# Patient Record
Sex: FEMALE | Race: BLACK OR AFRICAN AMERICAN | NOT HISPANIC OR LATINO | Employment: FULL TIME | URBAN - METROPOLITAN AREA
[De-identification: names, ages, dates, MRNs, and addresses within clinical notes are randomized per-mention and may not be internally consistent; named-entity substitution may affect disease eponyms.]

---

## 2017-03-28 DIAGNOSIS — Z12.31 ENCOUNTER FOR SCREENING MAMMOGRAM FOR MALIGNANT NEOPLASM OF BREAST: ICD-10-CM

## 2017-03-30 ENCOUNTER — LAB REQUISITION (OUTPATIENT)
Dept: LAB | Facility: HOSPITAL | Age: 52
End: 2017-03-30
Payer: COMMERCIAL

## 2017-03-30 ENCOUNTER — ALLSCRIPTS OFFICE VISIT (OUTPATIENT)
Dept: OTHER | Facility: OTHER | Age: 52
End: 2017-03-30

## 2017-03-30 DIAGNOSIS — Z01.419 ENCOUNTER FOR GYNECOLOGICAL EXAMINATION WITHOUT ABNORMAL FINDING: ICD-10-CM

## 2017-03-30 PROCEDURE — G0145 SCR C/V CYTO,THINLAYER,RESCR: HCPCS | Performed by: OBSTETRICS & GYNECOLOGY

## 2017-03-31 ENCOUNTER — CONVERSION ENCOUNTER (OUTPATIENT)
Dept: MAMMOGRAPHY | Facility: CLINIC | Age: 52
End: 2017-03-31

## 2017-03-31 ENCOUNTER — GENERIC CONVERSION - ENCOUNTER (OUTPATIENT)
Dept: OTHER | Facility: OTHER | Age: 52
End: 2017-03-31

## 2017-04-04 LAB
LAB AP GYN PRIMARY INTERPRETATION: NORMAL
LAB AP LMP: NORMAL
Lab: NORMAL

## 2017-11-09 ENCOUNTER — HOSPITAL ENCOUNTER (OUTPATIENT)
Dept: ULTRASOUND IMAGING | Facility: HOSPITAL | Age: 52
Discharge: HOME | End: 2017-11-09
Payer: COMMERCIAL

## 2017-11-09 ENCOUNTER — TRANSCRIBE ORDERS (OUTPATIENT)
Dept: ADMINISTRATIVE | Facility: HOSPITAL | Age: 52
End: 2017-11-09

## 2017-11-09 DIAGNOSIS — R13.19 CONSTANT LOW-GRADE DYSPHAGIA: Primary | ICD-10-CM

## 2017-11-09 DIAGNOSIS — R13.19 CONSTANT LOW-GRADE DYSPHAGIA: ICD-10-CM

## 2017-11-09 PROCEDURE — 76536 US EXAM OF HEAD AND NECK: CPT

## 2018-01-13 VITALS
HEIGHT: 60 IN | SYSTOLIC BLOOD PRESSURE: 120 MMHG | BODY MASS INDEX: 30.23 KG/M2 | DIASTOLIC BLOOD PRESSURE: 78 MMHG | WEIGHT: 154 LBS

## 2018-04-03 ENCOUNTER — ANNUAL EXAM (OUTPATIENT)
Dept: GYNECOLOGY | Facility: CLINIC | Age: 53
End: 2018-04-03
Payer: COMMERCIAL

## 2018-04-03 VITALS
DIASTOLIC BLOOD PRESSURE: 74 MMHG | WEIGHT: 138 LBS | BODY MASS INDEX: 27.82 KG/M2 | HEIGHT: 59 IN | SYSTOLIC BLOOD PRESSURE: 138 MMHG

## 2018-04-03 DIAGNOSIS — Z13.820 SCREENING FOR OSTEOPOROSIS: ICD-10-CM

## 2018-04-03 DIAGNOSIS — Z12.31 ENCOUNTER FOR SCREENING MAMMOGRAM FOR MALIGNANT NEOPLASM OF BREAST: Primary | ICD-10-CM

## 2018-04-03 DIAGNOSIS — Z01.419 ENCOUNTER FOR GYNECOLOGICAL EXAMINATION WITHOUT ABNORMAL FINDING: ICD-10-CM

## 2018-04-03 DIAGNOSIS — D25.1 FIBROIDS, INTRAMURAL: ICD-10-CM

## 2018-04-03 PROCEDURE — S0612 ANNUAL GYNECOLOGICAL EXAMINA: HCPCS | Performed by: OBSTETRICS & GYNECOLOGY

## 2018-04-03 PROCEDURE — G0145 SCR C/V CYTO,THINLAYER,RESCR: HCPCS | Performed by: OBSTETRICS & GYNECOLOGY

## 2018-04-03 PROCEDURE — 76977 US BONE DENSITY MEASURE: CPT | Performed by: OBSTETRICS & GYNECOLOGY

## 2018-04-03 RX ORDER — LORATADINE AND PSEUDOEPHEDRINE 10; 240 MG/1; MG/1
1 TABLET, EXTENDED RELEASE ORAL DAILY
COMMUNITY
End: 2018-09-27 | Stop reason: SDUPTHER

## 2018-04-03 NOTE — PROGRESS NOTES
Assessment/Plan:         Diagnoses and all orders for this visit:    Encounter for screening mammogram for malignant neoplasm of breast  -     Cancel: Mammo screening bilateral w 3d & cad; Future  -     Mammo screening bilateral w cad; Future    Encounter for gynecological examination without abnormal finding    Fibroids, intramural--stable  Will follow    Screening for osteoporosis  Heel scan today: +1 2  Other orders  -     loratadine-pseudoephedrine (CLARITIN-D 24-HOUR)  mg per 24 hr tablet; Take 1 tablet by mouth daily        Subjective:      Patient ID: Devon Perez is a 48 y o  female  No c/o  Hx fibroids  S/P myomectomy  LMP 7/17  Tolerating vasomotor symptoms    HPI    The following portions of the patient's history were reviewed and updated as appropriate: allergies, current medications, past family history, past medical history, past social history, past surgical history and problem list     Review of Systems   Constitutional: Negative  Gastrointestinal: Negative  Genitourinary: Negative  Objective:      /74 (BP Location: Left arm, Patient Position: Sitting, Cuff Size: Standard)   Ht 4' 11 45" (1 51 m)   Wt 62 6 kg (138 lb)   LMP 07/03/2017   BMI 27 45 kg/m²          Physical Exam   Constitutional: She appears well-developed and well-nourished  Neck: Normal range of motion  Neck supple  No thyromegaly present  Cardiovascular: Normal rate, regular rhythm and normal heart sounds  Pulmonary/Chest: Effort normal and breath sounds normal  Right breast exhibits no inverted nipple, no mass, no nipple discharge, no skin change and no tenderness  Left breast exhibits no inverted nipple, no mass, no nipple discharge, no skin change and no tenderness  Abdominal: Soft  Bowel sounds are normal  She exhibits no mass  There is no tenderness  Hernia confirmed negative in the right inguinal area and confirmed negative in the left inguinal area     Genitourinary: Vagina normal  There is no rash or lesion on the right labia  There is no rash or lesion on the left labia  Uterus is enlarged  Cervix exhibits no motion tenderness, no discharge and no friability  Right adnexum displays no mass, no tenderness and no fullness  Left adnexum displays no mass, no tenderness and no fullness  Genitourinary Comments: 12 week size stable fibroid uterus   Lymphadenopathy:        Right: No inguinal adenopathy present  Left: No inguinal adenopathy present

## 2018-04-04 ENCOUNTER — CONVERSION ENCOUNTER (OUTPATIENT)
Dept: MAMMOGRAPHY | Facility: CLINIC | Age: 53
End: 2018-04-04

## 2018-04-06 LAB
LAB AP GYN PRIMARY INTERPRETATION: NORMAL
Lab: NORMAL

## 2018-08-01 ENCOUNTER — OFFICE VISIT (OUTPATIENT)
Dept: FAMILY MEDICINE CLINIC | Facility: CLINIC | Age: 53
End: 2018-08-01
Payer: COMMERCIAL

## 2018-08-01 VITALS
BODY MASS INDEX: 26.66 KG/M2 | SYSTOLIC BLOOD PRESSURE: 110 MMHG | TEMPERATURE: 97.9 F | DIASTOLIC BLOOD PRESSURE: 70 MMHG | HEART RATE: 88 BPM | WEIGHT: 134 LBS

## 2018-08-01 DIAGNOSIS — M25.511 RIGHT SHOULDER PAIN, UNSPECIFIED CHRONICITY: Primary | ICD-10-CM

## 2018-08-01 PROCEDURE — 99213 OFFICE O/P EST LOW 20 MIN: CPT | Performed by: FAMILY MEDICINE

## 2018-08-01 RX ORDER — MELATONIN
COMMUNITY
Start: 2017-05-29 | End: 2018-09-27 | Stop reason: SDUPTHER

## 2018-08-01 NOTE — PATIENT INSTRUCTIONS
We will secure the report of the CT scan  Call me next Wednesday if you do not hear from me in the meantime  There are no restrictions

## 2018-08-01 NOTE — PROGRESS NOTES
Assessment/Plan:    No problem-specific Assessment & Plan notes found for this encounter  Diagnoses and all orders for this visit:    Right shoulder pain, unspecified chronicity  Comments:  had xray+ ct : pending receipt of report     will secure     likely dislocated th s-c- joint     Other orders  -     cholecalciferol (VITAMIN D3) 1,000 units tablet; use as directed          Subjective:      Patient ID: Wesley Hull is a 48 y o  female  Patient comes to evaluate right shoulder issues  At the end of May a she experienced an injury to the right shoulder while pushing a heavy object  With her shoulder  At that time she was seen and had x-rays done  X-ray was normal but she continued to have pain  It did not respond to 2 courses of anti-inflammatories  She subsequently underwent a CT scan of the area  They told her about some nodule  I do not have the immediate report but I will secure this  At this time she does not have too much pain but she is concerned about the deformity and about the nodularity  The following portions of the patient's history were reviewed and updated as appropriate: allergies, current medications, past family history, past medical history, past social history, past surgical history and problem list     Review of Systems   Musculoskeletal: Positive for arthralgias           Objective:  Vitals:    08/01/18 0954   BP: 110/70   BP Location: Left arm   Patient Position: Sitting   Cuff Size: Adult   Pulse: 88   Temp: 97 9 °F (36 6 °C)   Weight: 60 8 kg (134 lb)      Physical Exam   Musculoskeletal:   Prominence of R sterno clavicular jt : non tender

## 2018-08-07 ENCOUNTER — TELEPHONE (OUTPATIENT)
Dept: FAMILY MEDICINE CLINIC | Facility: CLINIC | Age: 53
End: 2018-08-07

## 2018-08-17 ENCOUNTER — TELEPHONE (OUTPATIENT)
Dept: FAMILY MEDICINE CLINIC | Facility: CLINIC | Age: 53
End: 2018-08-17

## 2018-08-17 NOTE — TELEPHONE ENCOUNTER
I do not see that Truman Garcia was contacted about CT scan report   She had CT scan and showed nodule on lung and was recommended to have repeat in 1 year

## 2018-09-27 ENCOUNTER — OFFICE VISIT (OUTPATIENT)
Dept: FAMILY MEDICINE CLINIC | Facility: CLINIC | Age: 53
End: 2018-09-27
Payer: COMMERCIAL

## 2018-09-27 VITALS
BODY MASS INDEX: 25.24 KG/M2 | RESPIRATION RATE: 16 BRPM | TEMPERATURE: 96.2 F | DIASTOLIC BLOOD PRESSURE: 60 MMHG | WEIGHT: 133.7 LBS | SYSTOLIC BLOOD PRESSURE: 100 MMHG | HEIGHT: 61 IN | HEART RATE: 84 BPM

## 2018-09-27 DIAGNOSIS — Z00.00 HEALTH MAINTENANCE EXAMINATION: Primary | ICD-10-CM

## 2018-09-27 PROBLEM — J30.9 ALLERGIC RHINITIS: Status: ACTIVE | Noted: 2017-10-12

## 2018-09-27 PROBLEM — R13.10 DIFFICULTY SWALLOWING: Status: ACTIVE | Noted: 2017-11-07

## 2018-09-27 PROBLEM — R13.10 DYSPHAGIA: Status: ACTIVE | Noted: 2017-11-07

## 2018-09-27 PROCEDURE — 99396 PREV VISIT EST AGE 40-64: CPT | Performed by: NURSE PRACTITIONER

## 2018-09-27 RX ORDER — LORATADINE 10 MG/1
TABLET ORAL EVERY 24 HOURS
COMMUNITY
Start: 2017-10-01 | End: 2019-07-08 | Stop reason: SDUPTHER

## 2018-09-27 RX ORDER — OMEPRAZOLE 20 MG/1
CAPSULE, DELAYED RELEASE ORAL
COMMUNITY
Start: 2017-12-12 | End: 2019-10-01 | Stop reason: ALTCHOICE

## 2018-09-27 NOTE — PROGRESS NOTES
Grupo Norris is a 48 y o   female and is here for routine health maintenance  The patient reports problems - hot flashes  Also having TMJ specialist , mouth guards, and had PT  Was having problems swallowing   Had a scope  Was ok    History of Present Illness     HPI  Here for wellness visit  Only issues as above  Well Adult Physical   Patient here for a comprehensive physical exam       Diet and Physical Activity  Diet: well balanced diet  Weight concerns: Patient is overweight (BMI 25 0-29  9)  Exercise: intermittently      Depression Screen  PHQ-9 Depression Screening    PHQ-9:    Frequency of the following problems over the past two weeks:       Little interest or pleasure in doing things:  0 - not at all  Feeling down, depressed, or hopeless:  0 - not at all  PHQ-2 Score:  0          General Health  Hearing: Normal:  bilateral  Vision: no vision problems  Dental: regular dental visits     History:  LMP: Patient's last menstrual period was 07/03/2017  Menopause at 46 years         Cancer Screening  Colononoscopy at age 48  Mammogram up to date   Pap up to date  Abnormal pap? no  Smoker no Annual screening with low-dose helical computed tomography (CT) for patients age 54 to 76 years with history of smoking at least 30 pack-years and, if a former smoker, had quit within the previous 15 years      The following portions of the patient's history were reviewed and updated as appropriate: allergies, current medications, past family history, past medical history, past social history, past surgical history and problem list     Review of Systems     Review of Systems   Constitutional: Negative for activity change, appetite change, fatigue, fever and unexpected weight change  HENT: Negative for congestion, dental problem, rhinorrhea, sinus pain and sneezing  Eyes: Negative for discharge and visual disturbance  Respiratory: Negative for cough and wheezing      Cardiovascular: Negative for chest pain and leg swelling  Gastrointestinal: Negative for abdominal pain, constipation, diarrhea, nausea and vomiting  Endocrine: Negative for polydipsia and polyuria  Genitourinary: Negative for dysuria and frequency  Musculoskeletal: Negative for arthralgias and back pain  Skin: Negative for rash  Allergic/Immunologic: Negative for environmental allergies and food allergies  Neurological: Negative for dizziness, light-headedness, numbness and headaches  Hematological: Negative for adenopathy  Psychiatric/Behavioral: Negative for behavioral problems and sleep disturbance  Past Medical History     Past Medical History:   Diagnosis Date    Fibroids 1994    UTERUS       Past Surgical History     History reviewed  No pertinent surgical history      Social History     Social History     Social History    Marital status: Single     Spouse name: N/A    Number of children: N/A    Years of education: N/A     Social History Main Topics    Smoking status: Never Smoker    Smokeless tobacco: Never Used    Alcohol use No    Drug use: No    Sexual activity: Not Asked     Other Topics Concern    None     Social History Narrative    None       Family History     Family History   Problem Relation Age of Onset    Hypertension Mother     Cancer Father     Heart attack Family     Stroke Family        Current Medications       Current Outpatient Prescriptions:     cholecalciferol (VITAMIN D3) 1,000 units tablet, use as directed, Disp: , Rfl:     loratadine (CLARITIN) 10 mg tablet, every 24 hours, Disp: , Rfl:     Multiple Vitamins-Minerals (MULTIVITAMIN ADULT PO), every 24 hours, Disp: , Rfl:     omeprazole (PriLOSEC) 20 mg delayed release capsule, take 1 capsule by oral route 2 times every day before a meal and at bedtime, Disp: , Rfl:      Allergies     Allergies   Allergen Reactions    Cefuroxime      Other reaction(s): Hives    Cetirizine      Other reaction(s): sleepy  Other reaction(s): sleepy    Fexofenadine      Other reaction(s): palpitations  Other reaction(s): palpitations    Penicillins     Sulfa Antibiotics     Sulfanilamide      Other reaction(s): Hives       Objective     /60 (BP Location: Left arm, Patient Position: Sitting, Cuff Size: Adult)   Pulse 84   Temp (!) 96 2 °F (35 7 °C) (Temporal)   Resp 16   Ht 5' 0 63" (1 54 m)   Wt 60 6 kg (133 lb 11 2 oz)   LMP 07/03/2017   BMI 25 57 kg/m²      Physical Exam   Constitutional: She is oriented to person, place, and time  She appears well-developed and well-nourished  HENT:   Head: Normocephalic  Right Ear: External ear normal    Left Ear: External ear normal    Nose: Nose normal    Eyes: Pupils are equal, round, and reactive to light  Conjunctivae are normal  Right eye exhibits no discharge  Left eye exhibits no discharge  Neck: Normal range of motion  Neck supple  No thyromegaly present  Cardiovascular: Normal rate, regular rhythm and normal heart sounds  No murmur heard  Pulmonary/Chest: Effort normal and breath sounds normal    Abdominal: Soft  Bowel sounds are normal  There is no tenderness  Musculoskeletal: Normal range of motion  Lymphadenopathy:     She has no cervical adenopathy  Neurological: She is alert and oriented to person, place, and time  Skin: Skin is warm  No rash noted  Psychiatric: She has a normal mood and affect  Her behavior is normal          No exam data present    Health Maintenance     Health Maintenance   Topic Date Due    Depression Screening PHQ  1965    CRC Screening: Colonoscopy  1965    INFLUENZA VACCINE  09/01/2018    DTaP,Tdap,and Td Vaccines (3 - Td) 02/21/2028     Immunization History   Administered Date(s) Administered    Influenza TIV (IM) 11/06/2013    Td (adult), adsorbed 02/21/2018    Tdap 04/18/2008       Assessment/Plan     1  Health maintenance examination    Issues with insurance covering lab studies     They will cover lipid and glucose  Will check with and auth--she states when we called with an auth they covered more  Will try for lipid, cmp, tsh, cbc, vitamin d    1  Healthy female exam   2  Patient Counseling:   · Exercise: Stressed the importance of regular exercise with a goal of 150 minutes per week  · Dental Health: Discussed daily flossing and brushing and regular dental visits     · Immunizations reviewed  · Discussed benefits of screening   · Discussed the patient's BMI with her  The BMI is in the acceptable range  3  Cancer Screening   4  Labs   5  Follow up in one year      Jayde Jules

## 2018-09-27 NOTE — PATIENT INSTRUCTIONS
Wellness Visit for Adults   WHAT YOU NEED TO KNOW:   What is a wellness visit? A wellness visit is when you see your healthcare provider to get screened for health problems  You can also get advice on how to stay healthy  Write down your questions so you remember to ask them  Ask your healthcare provider how often you should have a wellness visit  What happens at a wellness visit? Your healthcare provider will ask about your health, and your family history of health problems  This includes high blood pressure, heart disease, and cancer  He or she will ask if you have symptoms that concern you, if you smoke, and about your mood  You may also be asked about your intake of medicines, supplements, food, and alcohol  Any of the following may be done:  · Your weight  will be checked  Your height may also be checked so your body mass index (BMI) can be calculated  Your BMI shows if you are at a healthy weight  · Your blood pressure  and heart rate will be checked  Your temperature may also be checked  · Blood and urine tests  may be done  Blood tests may be done to check your cholesterol levels  Abnormal cholesterol levels increase your risk for heart disease and stroke  You may also need a blood or urine test to check for diabetes if you are at increased risk  Urine tests may be done to look for signs of an infection or kidney disease  · A physical exam  includes checking your heartbeat and lungs with a stethoscope  Your healthcare provider may also check your skin to look for sun damage  · Screening tests  may be recommended  A screening test is done to check for diseases that may not cause symptoms  The screening tests you may need depend on your age, gender, family history, and lifestyle habits  For example, colorectal screening may be recommended if you are 48years old or older  What screening tests do I need if I am a woman? · A Pap smear  is used to screen for cervical cancer   Pap smears are usually done every 3 to 5 years depending on your age  You may need them more often if you have had abnormal Pap smear test results in the past  Ask your healthcare provider how often you should have a Pap smear  · A mammogram  is an x-ray of your breasts to screen for breast cancer  Experts recommend mammograms every 2 years starting at age 48 years  You may need a mammogram at age 52 years or younger if you have an increased risk for breast cancer  Talk to your healthcare provider about when you should start having mammograms and how often you need them  What vaccines might I need? · Get an influenza vaccine  every year  The influenza vaccine protects you from the flu  Several types of viruses cause the flu  The viruses change over time, so new vaccines are made each year  · Get a tetanus-diphtheria (Td) booster vaccine  every 10 years  This vaccine protects you against tetanus and diphtheria  Tetanus is a severe infection that may cause painful muscle spasms and lockjaw  Diphtheria is a severe bacterial infection that causes a thick covering in the back of your mouth and throat  · Get a human papillomavirus (HPV) vaccine  if you are female and aged 23 to 32 or male 23 to 24 and never received it  This vaccine protects you from HPV infection  HPV is the most common infection spread by sexual contact  HPV may also cause vaginal, penile, and anal cancers  · Get a pneumococcal vaccine  if you are aged 72 years or older  The pneumococcal vaccine is an injection given to protect you from pneumococcal disease  Pneumococcal disease is an infection caused by pneumococcal bacteria  The infection may cause pneumonia, meningitis, or an ear infection  · Get a shingles vaccine  if you are aged 61 or older, even if you have had shingles before  The shingles vaccine is an injection to protect you from the varicella-zoster virus  This is the same virus that causes chickenpox   Shingles is a painful rash that develops in people who had chickenpox or have been exposed to the virus  How can I eat healthy? My Plate is a model for planning healthy meals  It shows the types and amounts of foods that should go on your plate  Fruits and vegetables make up about half of your plate, and grains and protein make up the other half  A serving of dairy is included on the side of your plate  The amount of calories and serving sizes you need depends on your age, gender, weight, and height  Examples of healthy foods are listed below:  · Eat a variety of vegetables  such as dark green, red, and orange vegetables  You can also include canned vegetables low in sodium (salt) and frozen vegetables without added butter or sauces  · Eat a variety of fresh fruits , canned fruit in 100% juice, frozen fruit, and dried fruit  · Include whole grains  At least half of the grains you eat should be whole grains  Examples include whole-wheat bread, wheat pasta, brown rice, and whole-grain cereals such as oatmeal     · Eat a variety of protein foods such as seafood (fish and shellfish), lean meat, and poultry without skin (turkey and chicken)  Examples of lean meats include pork leg, shoulder, or tenderloin, and beef round, sirloin, tenderloin, and extra lean ground beef  Other protein foods include eggs and egg substitutes, beans, peas, soy products, nuts, and seeds  · Choose low-fat dairy products such as skim or 1% milk or low-fat yogurt, cheese, and cottage cheese  · Limit unhealthy fats  such as butter, hard margarine, and shortening  How much exercise do I need? Exercise at least 30 minutes per day on most days of the week  Some examples of exercise include walking, biking, dancing, and swimming  You can also fit in more physical activity by taking the stairs instead of the elevator or parking farther away from stores  Include muscle strengthening activities 2 days each week  Regular exercise provides many health benefits  It helps you manage your weight, and decreases your risk for type 2 diabetes, heart disease, stroke, and high blood pressure  Exercise can also help improve your mood  Ask your healthcare provider about the best exercise plan for you  What are some general health and safety guidelines I should follow? · Do not smoke  Nicotine and other chemicals in cigarettes and cigars can cause lung damage  Ask your healthcare provider for information if you currently smoke and need help to quit  E-cigarettes or smokeless tobacco still contain nicotine  Talk to your healthcare provider before you use these products  · Limit alcohol  A drink of alcohol is 12 ounces of beer, 5 ounces of wine, or 1½ ounces of liquor  · Lose weight, if needed  Being overweight increases your risk of certain health conditions  These include heart disease, high blood pressure, type 2 diabetes, and certain types of cancer  · Protect your skin  Do not sunbathe or use tanning beds  Use sunscreen with a SPF 15 or higher  Apply sunscreen at least 15 minutes before you go outside  Reapply sunscreen every 2 hours  Wear protective clothing, hats, and sunglasses when you are outside  · Drive safely  Always wear your seatbelt  Make sure everyone in your car wears a seatbelt  A seatbelt can save your life if you are in an accident  Do not use your cell phone when you are driving  This could distract you and cause an accident  Pull over if you need to make a call or send a text message  · Practice safe sex  Use latex condoms if are sexually active and have more than one partner  Your healthcare provider may recommend screening tests for sexually transmitted infections (STIs)  · Wear helmets, lifejackets, and protective gear  Always wear a helmet when you ride a bike or motorcycle, go skiing, or play sports that could cause a head injury  Wear protective equipment when you play sports   Wear a lifejacket when you are on a boat or doing water sports  CARE AGREEMENT:   You have the right to help plan your care  Learn about your health condition and how it may be treated  Discuss treatment options with your caregivers to decide what care you want to receive  You always have the right to refuse treatment  The above information is an  only  It is not intended as medical advice for individual conditions or treatments  Talk to your doctor, nurse or pharmacist before following any medical regimen to see if it is safe and effective for you  © 2017 [employer] Information is for End User's use only and may not be sold, redistributed or otherwise used for commercial purposes  All illustrations and images included in CareNotes® are the copyrighted property of A [employer]  or Amanuel Calderon

## 2018-10-01 ENCOUNTER — TELEPHONE (OUTPATIENT)
Dept: FAMILY MEDICINE CLINIC | Facility: CLINIC | Age: 53
End: 2018-10-01

## 2018-10-01 NOTE — TELEPHONE ENCOUNTER
Called and spoke to patient to see where she got bloodwork done  She states she didn't get it done yet due to the previous message about a prior auth for her insurance to cover what Paula is ordering  Informed pt will check with Paula and [facility] on Tuesday regarding this

## 2018-10-01 NOTE — TELEPHONE ENCOUNTER
Patient called she said that she was seen by rajeev and that she is to have additional labs done and that with her insurance can get a prior auth done for it to be covered and that rajeev was going to let you know about this and patient also she can be reached at [phone]   TY

## 2018-10-02 DIAGNOSIS — E55.9 VITAMIN D DEFICIENCY: Primary | ICD-10-CM

## 2018-10-02 DIAGNOSIS — R53.83 FATIGUE, UNSPECIFIED TYPE: ICD-10-CM

## 2018-10-02 NOTE — TELEPHONE ENCOUNTER
Pt called and was given the codes for the bloodwork to contact her insurance to see about them approving it for her

## 2018-10-02 NOTE — TELEPHONE ENCOUNTER
Called and left pt a message to call back regarding her labs  Spoke to Nicci Valdovinos regarding previous message  She stated to inform patient to call her insurance and give them the codes to see if they will cover the labs being ordered

## 2018-10-02 NOTE — TELEPHONE ENCOUNTER
I spoke to pt and gave her more CPT codes she called back and was not happy because the insurance would not tell her if the test are covered or not, she said that she wanted to know how much it would be for her to get the bw done and I advised her to call the lab and ask we do not bill for quest  She will call back if she has any other questions

## 2018-10-05 LAB
25(OH)D3 SERPL-MCNC: 47 NG/ML (ref 30–100)
BASOPHILS # BLD AUTO: 30 CELLS/UL (ref 0–200)
BASOPHILS NFR BLD AUTO: 0.4 %
CHOLEST SERPL-MCNC: 198 MG/DL
CHOLEST/HDLC SERPL: 2.5 (CALC)
EOSINOPHIL # BLD AUTO: 113 CELLS/UL (ref 15–500)
EOSINOPHIL NFR BLD AUTO: 1.5 %
ERYTHROCYTE [DISTWIDTH] IN BLOOD BY AUTOMATED COUNT: 12.9 % (ref 11–15)
GLUCOSE P FAST SERPL-MCNC: 74 MG/DL (ref 65–99)
HCT VFR BLD AUTO: 39.2 % (ref 35–45)
HDLC SERPL-MCNC: 79 MG/DL
HGB BLD-MCNC: 13.5 G/DL (ref 11.7–15.5)
LDLC SERPL CALC-MCNC: 103 MG/DL (CALC)
LYMPHOCYTES # BLD AUTO: 2595 CELLS/UL (ref 850–3900)
LYMPHOCYTES NFR BLD AUTO: 34.6 %
MCH RBC QN AUTO: 31.3 PG (ref 27–33)
MCHC RBC AUTO-ENTMCNC: 34.4 G/DL (ref 32–36)
MCV RBC AUTO: 91 FL (ref 80–100)
MONOCYTES # BLD AUTO: 375 CELLS/UL (ref 200–950)
MONOCYTES NFR BLD AUTO: 5 %
NEUTROPHILS # BLD AUTO: 4388 CELLS/UL (ref 1500–7800)
NEUTROPHILS NFR BLD AUTO: 58.5 %
NONHDLC SERPL-MCNC: 119 MG/DL (CALC)
PLATELET # BLD AUTO: 332 THOUSAND/UL (ref 140–400)
PMV BLD REES-ECKER: 8.6 FL (ref 7.5–12.5)
RBC # BLD AUTO: 4.31 MILLION/UL (ref 3.8–5.1)
TRIGL SERPL-MCNC: 71 MG/DL
WBC # BLD AUTO: 7.5 THOUSAND/UL (ref 3.8–10.8)

## 2018-10-08 ENCOUNTER — TELEPHONE (OUTPATIENT)
Dept: FAMILY MEDICINE CLINIC | Facility: CLINIC | Age: 53
End: 2018-10-08

## 2018-10-08 NOTE — TELEPHONE ENCOUNTER
Patient called she would like to know if her lab results came in she had it done at "[facility] she would like the results she can be reached at [phone] ty

## 2018-10-10 ENCOUNTER — TELEPHONE (OUTPATIENT)
Dept: FAMILY MEDICINE CLINIC | Facility: CLINIC | Age: 53
End: 2018-10-10

## 2018-10-10 NOTE — TELEPHONE ENCOUNTER
Patient called she would like her lab results was done at [facility] she called on Monday also she can be reached at [phone] ty

## 2018-10-11 ENCOUNTER — TELEPHONE (OUTPATIENT)
Dept: FAMILY MEDICINE CLINIC | Facility: CLINIC | Age: 53
End: 2018-10-11

## 2018-10-11 NOTE — TELEPHONE ENCOUNTER
Patient called she said that she was given her lab results she would like to know the numbers she can be reached at [phone]   TY

## 2018-11-19 ENCOUNTER — OFFICE VISIT (OUTPATIENT)
Dept: FAMILY MEDICINE CLINIC | Facility: CLINIC | Age: 53
End: 2018-11-19
Payer: COMMERCIAL

## 2018-11-19 VITALS
HEIGHT: 60 IN | RESPIRATION RATE: 18 BRPM | WEIGHT: 133 LBS | SYSTOLIC BLOOD PRESSURE: 120 MMHG | TEMPERATURE: 97.9 F | HEART RATE: 100 BPM | BODY MASS INDEX: 26.11 KG/M2 | DIASTOLIC BLOOD PRESSURE: 80 MMHG

## 2018-11-19 DIAGNOSIS — B35.1 ONYCHOMYCOSIS: Primary | ICD-10-CM

## 2018-11-19 PROCEDURE — 1036F TOBACCO NON-USER: CPT | Performed by: NURSE PRACTITIONER

## 2018-11-19 PROCEDURE — 3008F BODY MASS INDEX DOCD: CPT | Performed by: NURSE PRACTITIONER

## 2018-11-19 PROCEDURE — 99212 OFFICE O/P EST SF 10 MIN: CPT | Performed by: NURSE PRACTITIONER

## 2018-11-19 NOTE — PROGRESS NOTES
Subjective:   Chief Complaint   Patient presents with    Nail Problem     Discoloration        Patient ID: Parag Trejo is a 48 y o  female  Presents today over concern of nail discoloration on bilateral feet and hands  Primarily little toe on both feet with fungal discoloration  Patient has tried an over-the-counter any fungal with some relief  However she discontinues once the appearance starts to look normal   Discussed applying for 2-3 weeks after appearance has returned to normal        The following portions of the patient's history were reviewed and updated as appropriate: allergies, current medications, past family history, past medical history, past social history, past surgical history and problem list     Review of Systems   Constitutional: Negative for chills and fever  Respiratory: Negative for cough  Cardiovascular: Negative for chest pain  Skin: Positive for color change (little toes bilateral feet)  Psychiatric/Behavioral: Negative for dysphoric mood  The patient is not nervous/anxious  Objective:  Vitals:    11/19/18 0956   BP: 120/80   BP Location: Left arm   Patient Position: Sitting   Cuff Size: Standard   Pulse: 100   Resp: 18   Temp: 97 9 °F (36 6 °C)   TempSrc: Oral   Weight: 60 3 kg (133 lb)   Height: 5' (1 524 m)      Physical Exam   Constitutional: She is oriented to person, place, and time  She appears well-developed and well-nourished  Neck: Normal range of motion  Neck supple  Cardiovascular: Normal rate and regular rhythm  Pulmonary/Chest: Effort normal and breath sounds normal    Lymphadenopathy:     She has no cervical adenopathy  Neurological: She is alert and oriented to person, place, and time  Skin: Skin is warm and dry  Bilateral 5th toes with fungal appearance to nail  Psychiatric: She has a normal mood and affect   Her behavior is normal          Assessment/Plan:    No problem-specific Assessment & Plan notes found for this encounter  Diagnoses and all orders for this visit:    Onychomycosis  Comments:   Will try over the counter nail fugus treatment for several weeks

## 2018-11-19 NOTE — PATIENT INSTRUCTIONS
Paronychia   WHAT YOU NEED TO KNOW:   Paronychia is an infection of your nail fold caused by bacteria or a fungus  The nail fold is the skin around your nail  Paronychia may happen suddenly and last for 6 weeks or longer  You may have paronychia on more than 1 finger or toe  DISCHARGE INSTRUCTIONS:   Medicines:   · Td vaccine  is a booster shot used to help prevent tetanus and diphtheria  The Td booster may be given to adolescents and adults every 10 years or for certain wounds and injuries  · Antibiotics: This medicine will help fight or prevent an infection  It may be given as a pill, cream, or ointment  · Steroids: This medicine will help decrease inflammation  It may be given as a pill, cream, or ointment  · Antifungal medicine: This medicine helps kill fungus that may be causing your infection  It may be given as a cream or ointment  · NSAIDs:  These medicines decrease pain and swelling  NSAIDs are available without a doctor's order  Ask your healthcare provider which medicine is right for you  Ask how much to take and when to take it  Take as directed  NSAIDs can cause stomach bleeding and kidney problems if not taken correctly  · Take your medicine as directed  Contact your healthcare provider if you think your medicine is not helping or if you have side effects  Tell him of her if you are allergic to any medicine  Keep a list of the medicines, vitamins, and herbs you take  Include the amounts, and when and why you take them  Bring the list or the pill bottles to follow-up visits  Carry your medicine list with you in case of an emergency  Follow up with your healthcare provider as directed:  Write down your questions so you remember to ask them during your visits  Self-care:   · Soak your nail:  Soak your nail in a mixture of equal parts vinegar and water 3 or 4 times each day  This will help decrease inflammation      · Apply a warm compress:  Soak a washcloth in warm water and place it on your nail  This will help decrease inflammation  · Elevate:  Raise your nail above the level of your heart as often as you can  This will help decrease swelling and pain  Prop your nail on pillows or blankets to keep it elevated comfortably  · Use lotion:  Apply lotion after you wash your hands  This will prevent your skin from becoming too dry  Prevent paronychia:   · Avoid chemicals and allergens that may harm your skin and nails  This includes soaps, laundry detergents, and nail products  · Keep your nails clean and dry  Avoid soaking your nails in water  Use cotton-lined rubber gloves or wear 2 rubber gloves if you work with food or water  The gloves will help protect your nail folds  · Keep your nails short  Do not bite your nails, pick at your hangnails, suck your fingers, or wear fake nails  Bring your own nail tools when you go to the nail salon  Contact your healthcare provider if:   · Your nail becomes loose, deformed, or falls off  · You have a large abscess on your nail  · You have questions or concerns about your condition or care  Return to the emergency department if:   · You have severe nail pain  · The inflammation spreads to your hand or arm  © 2017 [facility] Information is for End User's use only and may not be sold, redistributed or otherwise used for commercial purposes  All illustrations and images included in CareNotes® are the copyrighted property of [employer]  or Amanuel Calderon  The above information is an  only  It is not intended as medical advice for individual conditions or treatments  Talk to your doctor, nurse or pharmacist before following any medical regimen to see if it is safe and effective for you

## 2019-01-11 ENCOUNTER — TELEPHONE (OUTPATIENT)
Dept: GYNECOLOGY | Facility: CLINIC | Age: 54
End: 2019-01-11

## 2019-01-11 NOTE — TELEPHONE ENCOUNTER
An e-mail was sent to the billing office for the claim to be resubmitted with the correct dx code [id number]  Please inform her

## 2019-01-11 NOTE — TELEPHONE ENCOUNTER
Hank Pan states she got a bill from 4/3/2018 for the heel scan  She said she called her insurance and the wrong code was dropped

## 2019-01-21 ENCOUNTER — OFFICE VISIT (OUTPATIENT)
Dept: FAMILY MEDICINE CLINIC | Facility: CLINIC | Age: 54
End: 2019-01-21
Payer: COMMERCIAL

## 2019-01-21 VITALS
DIASTOLIC BLOOD PRESSURE: 76 MMHG | WEIGHT: 138.6 LBS | BODY MASS INDEX: 26.17 KG/M2 | SYSTOLIC BLOOD PRESSURE: 120 MMHG | HEART RATE: 90 BPM | HEIGHT: 61 IN | RESPIRATION RATE: 18 BRPM

## 2019-01-21 DIAGNOSIS — B35.1 ONYCHOMYCOSIS: Primary | ICD-10-CM

## 2019-01-21 PROCEDURE — 99212 OFFICE O/P EST SF 10 MIN: CPT | Performed by: NURSE PRACTITIONER

## 2019-01-21 PROCEDURE — 1036F TOBACCO NON-USER: CPT | Performed by: NURSE PRACTITIONER

## 2019-01-21 PROCEDURE — 3008F BODY MASS INDEX DOCD: CPT | Performed by: NURSE PRACTITIONER

## 2019-01-21 NOTE — PROGRESS NOTES
Subjective:   Chief Complaint   Patient presents with    Follow-up        Patient ID: Jailyn De is a 47 y o  female  Presents today for follow-up on Onychomycosis of multiple toes bilaterally  Patient has been using a different nail application with good success  The following portions of the patient's history were reviewed and updated as appropriate: allergies, current medications, past family history, past medical history, past social history, past surgical history and problem list     Review of Systems   Constitutional: Negative for chills and fever  Respiratory: Negative for cough and shortness of breath  Cardiovascular: Negative for chest pain, palpitations and leg swelling  Skin: Negative for color change (toe nails)  Psychiatric/Behavioral: Negative for dysphoric mood  The patient is not nervous/anxious  Objective:  Vitals:    01/21/19 1342   BP: 120/76   BP Location: Left arm   Patient Position: Sitting   Cuff Size: Standard   Pulse: 90   Resp: 18   Weight: 62 9 kg (138 lb 9 6 oz)   Height: 5' 0 63" (1 54 m)      Physical Exam   Constitutional: She is oriented to person, place, and time  She appears well-developed and well-nourished  Cardiovascular: Normal rate and normal heart sounds  Pulmonary/Chest: Effort normal and breath sounds normal    Neurological: She is alert and oriented to person, place, and time  Skin: Skin is warm and dry  Normal nails on bilateral feet   Psychiatric: She has a normal mood and affect  Her behavior is normal          Assessment/Plan:    No problem-specific Assessment & Plan notes found for this encounter         Diagnoses and all orders for this visit:    Onychomycosis  Comments:  Resolved

## 2019-02-14 ENCOUNTER — TRANSCRIBE ORDERS (OUTPATIENT)
Dept: ADMINISTRATIVE | Facility: HOSPITAL | Age: 54
End: 2019-02-14

## 2019-02-14 DIAGNOSIS — Z12.39 SCREENING BREAST EXAMINATION: Primary | ICD-10-CM

## 2019-04-04 ENCOUNTER — ANNUAL EXAM (OUTPATIENT)
Dept: GYNECOLOGY | Facility: CLINIC | Age: 54
End: 2019-04-04
Payer: COMMERCIAL

## 2019-04-04 ENCOUNTER — HOSPITAL ENCOUNTER (OUTPATIENT)
Dept: MAMMOGRAPHY | Facility: MEDICAL CENTER | Age: 54
Discharge: HOME | End: 2019-04-04
Payer: COMMERCIAL

## 2019-04-04 VITALS
HEIGHT: 59 IN | DIASTOLIC BLOOD PRESSURE: 70 MMHG | BODY MASS INDEX: 28.55 KG/M2 | HEART RATE: 102 BPM | SYSTOLIC BLOOD PRESSURE: 130 MMHG | WEIGHT: 141.6 LBS

## 2019-04-04 DIAGNOSIS — D21.9 FIBROIDS: ICD-10-CM

## 2019-04-04 DIAGNOSIS — N95.1 HOT FLASHES DUE TO MENOPAUSE: ICD-10-CM

## 2019-04-04 DIAGNOSIS — Z12.39 SCREENING BREAST EXAMINATION: ICD-10-CM

## 2019-04-04 DIAGNOSIS — Z12.4 ENCOUNTER FOR PAPANICOLAOU SMEAR FOR CERVICAL CANCER SCREENING: Primary | ICD-10-CM

## 2019-04-04 PROCEDURE — S0612 ANNUAL GYNECOLOGICAL EXAMINA: HCPCS | Performed by: OBSTETRICS & GYNECOLOGY

## 2019-04-04 PROCEDURE — G0145 SCR C/V CYTO,THINLAYER,RESCR: HCPCS | Performed by: OBSTETRICS & GYNECOLOGY

## 2019-04-04 PROCEDURE — 77067 SCR MAMMO BI INCL CAD: CPT

## 2019-04-04 RX ORDER — LORATADINE AND PSEUDOEPHEDRINE 10; 240 MG/1; MG/1
1 TABLET, EXTENDED RELEASE ORAL DAILY
COMMUNITY
End: 2022-04-21 | Stop reason: ALTCHOICE

## 2019-04-08 LAB
LAB AP GYN PRIMARY INTERPRETATION: NORMAL
Lab: NORMAL

## 2019-05-28 ENCOUNTER — TELEPHONE (OUTPATIENT)
Dept: FAMILY MEDICINE CLINIC | Facility: CLINIC | Age: 54
End: 2019-05-28

## 2019-07-08 ENCOUNTER — OFFICE VISIT (OUTPATIENT)
Dept: FAMILY MEDICINE CLINIC | Facility: CLINIC | Age: 54
End: 2019-07-08
Payer: COMMERCIAL

## 2019-07-08 VITALS
SYSTOLIC BLOOD PRESSURE: 132 MMHG | TEMPERATURE: 98.3 F | WEIGHT: 142.5 LBS | HEART RATE: 64 BPM | OXYGEN SATURATION: 100 % | HEIGHT: 59 IN | DIASTOLIC BLOOD PRESSURE: 68 MMHG | RESPIRATION RATE: 18 BRPM | BODY MASS INDEX: 28.73 KG/M2

## 2019-07-08 DIAGNOSIS — M25.532 LEFT WRIST PAIN: Primary | ICD-10-CM

## 2019-07-08 PROCEDURE — 1036F TOBACCO NON-USER: CPT | Performed by: NURSE PRACTITIONER

## 2019-07-08 PROCEDURE — 3008F BODY MASS INDEX DOCD: CPT | Performed by: NURSE PRACTITIONER

## 2019-07-08 PROCEDURE — 99213 OFFICE O/P EST LOW 20 MIN: CPT | Performed by: NURSE PRACTITIONER

## 2019-07-08 RX ORDER — NAPROXEN 250 MG/1
250 TABLET ORAL 2 TIMES DAILY
Qty: 10 TABLET | Refills: 0 | Status: SHIPPED | OUTPATIENT
Start: 2019-07-08 | End: 2019-07-25 | Stop reason: SDUPTHER

## 2019-07-08 RX ORDER — NAPROXEN 250 MG/1
250 TABLET ORAL 2 TIMES DAILY
Refills: 0 | COMMUNITY
Start: 2019-06-27 | End: 2019-07-08 | Stop reason: SDUPTHER

## 2019-07-08 NOTE — PROGRESS NOTES
Subjective:   Chief Complaint   Patient presents with    Wrist Pain     left hand        Patient ID: Delvin Nixon is a 47 y o  female  Presents today with complaints of left thumb into wrist pain since May  She was lifting stones when she 1st began noticing the discomfort  She did reach out to the tele talk to then put her on naproxen twice a day  She has been doing the naproxen twice a day for 10 days with some relief  She has been doing heat as well as ice  Pain is primarily at night after she has been using her hands all day even though she is right-handed and in the a m  The following portions of the patient's history were reviewed and updated as appropriate: allergies, current medications, past family history, past medical history, past social history, past surgical history and problem list     Review of Systems   Constitutional: Negative for chills and fever  Respiratory: Negative for cough  Cardiovascular: Negative for chest pain and palpitations  Musculoskeletal: Positive for arthralgias (left thumb and wrist)  Negative for joint swelling  Psychiatric/Behavioral: Negative for dysphoric mood  The patient is not nervous/anxious  Objective:  Vitals:    07/08/19 1057   BP: 132/68   BP Location: Left arm   Patient Position: Sitting   Cuff Size: Adult   Pulse: 64   Resp: 18   Temp: 98 3 °F (36 8 °C)   TempSrc: Temporal   SpO2: 100%   Weight: 64 6 kg (142 lb 8 oz)   Height: 4' 11" (1 499 m)      Physical Exam   Constitutional: She is oriented to person, place, and time  She appears well-developed and well-nourished  Cardiovascular: Normal rate, regular rhythm, normal heart sounds and intact distal pulses  Pulmonary/Chest: Effort normal and breath sounds normal    Musculoskeletal:        Left wrist: She exhibits tenderness and bony tenderness  She exhibits normal range of motion, no swelling and no deformity     Neurological: She is alert and oriented to person, place, and time    Skin: Skin is warm and dry  Psychiatric: She has a normal mood and affect  Her behavior is normal          Assessment/Plan:    No problem-specific Assessment & Plan notes found for this encounter  Diagnoses and all orders for this visit:    Left wrist pain  Comments:  Heat or Ice to area as needed  Cock up to be worn during the day  Orders:  -     XR wrist 3+ vw left; Future  -     Cock Up Wrist Splint  -     naproxen (NAPROSYN) 250 mg tablet; Take 1 tablet (250 mg total) by mouth 2 (two) times a day    Other orders  -     Discontinue: naproxen (NAPROSYN) 250 mg tablet;  Take 250 mg by mouth 2 (two) times a day

## 2019-07-08 NOTE — PATIENT INSTRUCTIONS
Wrist Injury   WHAT YOU NEED TO KNOW:   A wrist injury happens when the tissues of your wrist joint are damaged  Your wrist joint is made up of tendons, ligaments, nerves, and bones  Two common types of injuries that can happen to your wrist are sprains and strains  A sprain can happen when the ligaments are stretched or torn  Ligaments are bands of elastic tissue that connect and hold the bones together  A strain happens when a tendon or muscle is overused, stretched, or torn  Tendons attach your hand and arm muscles to the bones of the wrist    DISCHARGE INSTRUCTIONS:   Medicines:   · NSAIDs:  These medicines decrease swelling, pain, and fever  NSAIDs are available without a doctor's order  Ask which medicine is right for you  Ask how much to take and when to take it  Take as directed  NSAIDs can cause stomach bleeding and kidney problems if not taken correctly  · Pain medicine: You may be given a prescription medicine to decrease pain  Do not wait until the pain is severe before you take this medicine  · Take your medicine as directed  Contact your healthcare provider if you think your medicine is not helping or if you have side effects  Tell him of her if you are allergic to any medicine  Keep a list of the medicines, vitamins, and herbs you take  Include the amounts, and when and why you take them  Bring the list or the pill bottles to follow-up visits  Carry your medicine list with you in case of an emergency  Follow up with your healthcare provider as directed:  Write down your questions so you remember to ask them during your visits  Manage your symptoms:   · Wrist supports:  A cast or splint may be put on your fingers, hand, and wrist to support your wrist and prevent further damage  Wear these as directed  Ask for instructions on how to bathe while you are wearing a splint or case  · Rest:  You may need to rest your wrist for at least 48 hours and avoid activities that cause pain   Ask what activities you should avoid and for how long  · Ice:  Ice helps decrease swelling and pain  Ice may also help prevent tissue damage  Use an ice pack or put crushed ice in a plastic bag  Cover it with a towel and place it on your injured wrist for 15 to 20 minutes every hour as directed  · Compression:  Your healthcare provider may suggest you wrap your wrist with an elastic bandage  This will help decrease swelling, support your wrist, and help it heal  Wear your wrist wrap as directed  Ask for instructions on how to wrap your wrist     · Elevation:  When you sit or lie down, keep your wrist at or above the level of your heart  This may help decrease pain and swelling  Physical therapy:  Your healthcare provider may recommend that you go to physical therapy  A physical therapist shows you how to do exercises that can help to strengthen your wrist and improve its range of movement  These exercises may also help decrease your pain  Prevent another wrist injury:   · Do strengthening exercises: Your healthcare provider or physical therapist may suggest that you do exercises to strengthen your hand and arm muscles  Ask when you may return to your regular physical activities or sports  If you start to exercise too soon it may cause you to injure your wrist again  · Protect your wrists:  Wrist guard splints or protective tape can help to support your wrist during exercise and sports  These devices may also keep your wrist from bending too far back  Ask for more information about the type of wrist support that you should use  Contact your healthcare provider if:   · You have a fever  · The bruising, swelling, or pain in your wrist gets worse  · You have questions or concerns about your condition or care  Return to the emergency department if:   · The skin on or near your wrist or hand feels cold, or it turns blue or white      · The skin on or near your wrist or hand is very tight, raised, and swollen  · You have new trouble moving and using your hands, fingers, or wrist     · Your wrist, hands, or fingers become swollen, red, numb, or they tingle  · Your wrist has any open wounds, including from surgery, that are red, swollen, warm, or have pus coming from them  © 2017 [address] [employer] Information is for End User's use only and may not be sold, redistributed or otherwise used for commercial purposes  All illustrations and images included in CareNotes® are the copyrighted property of A D A M [employer]  or Amanuel Calderon  The above information is an  only  It is not intended as medical advice for individual conditions or treatments  Talk to your doctor, nurse or pharmacist before following any medical regimen to see if it is safe and effective for you

## 2019-07-09 ENCOUNTER — TELEPHONE (OUTPATIENT)
Dept: FAMILY MEDICINE CLINIC | Facility: CLINIC | Age: 54
End: 2019-07-09

## 2019-07-09 NOTE — TELEPHONE ENCOUNTER
Pt called back and was given the results of her xray  She wants to know how it can be seen that she has arthritis? At what point does it show up

## 2019-07-25 ENCOUNTER — TELEPHONE (OUTPATIENT)
Dept: FAMILY MEDICINE CLINIC | Facility: CLINIC | Age: 54
End: 2019-07-25

## 2019-07-25 DIAGNOSIS — M25.532 LEFT WRIST PAIN: ICD-10-CM

## 2019-07-25 RX ORDER — NAPROXEN 250 MG/1
250 TABLET ORAL 2 TIMES DAILY
Qty: 10 TABLET | Refills: 0 | Status: SHIPPED | OUTPATIENT
Start: 2019-07-25 | End: 2019-08-14 | Stop reason: SDUPTHER

## 2019-07-25 NOTE — TELEPHONE ENCOUNTER
Pt called stating she is still having pain in her right hand  She states she put a different brace on it which feels better the the other one she was using  She is looking to see if she can get a refill on her Naproxen  She states she ran out of it last week  Pharmacy is on file

## 2019-08-14 DIAGNOSIS — M25.532 LEFT WRIST PAIN: ICD-10-CM

## 2019-08-14 RX ORDER — NAPROXEN 250 MG/1
250 TABLET ORAL 2 TIMES DAILY
Qty: 10 TABLET | Refills: 0 | Status: SHIPPED | OUTPATIENT
Start: 2019-08-14 | End: 2019-10-01 | Stop reason: ALTCHOICE

## 2019-08-27 ENCOUNTER — TELEPHONE (OUTPATIENT)
Dept: FAMILY MEDICINE CLINIC | Facility: CLINIC | Age: 54
End: 2019-08-27

## 2019-08-27 DIAGNOSIS — M25.532 LEFT WRIST PAIN: Primary | ICD-10-CM

## 2019-08-27 NOTE — TELEPHONE ENCOUNTER
Call from pt states that she is still having the inflammation issues with her left wrist  Pt is icing it, taking Advil, Aleeve, pt had a x-ray done  using the wrist band on and off  Pt wants to know how long she is to do all these alternatives? Does she need to give it more time? Please advise at [phone]

## 2019-10-01 ENCOUNTER — OFFICE VISIT (OUTPATIENT)
Dept: FAMILY MEDICINE CLINIC | Facility: CLINIC | Age: 54
End: 2019-10-01
Payer: COMMERCIAL

## 2019-10-01 VITALS
OXYGEN SATURATION: 99 % | WEIGHT: 145.2 LBS | HEIGHT: 60 IN | HEART RATE: 92 BPM | DIASTOLIC BLOOD PRESSURE: 80 MMHG | SYSTOLIC BLOOD PRESSURE: 138 MMHG | BODY MASS INDEX: 28.51 KG/M2 | TEMPERATURE: 98.6 F

## 2019-10-01 DIAGNOSIS — J30.9 ALLERGIC RHINITIS, UNSPECIFIED SEASONALITY, UNSPECIFIED TRIGGER: ICD-10-CM

## 2019-10-01 DIAGNOSIS — H60.542 ECZEMA OF LEFT EXTERNAL EAR: Chronic | ICD-10-CM

## 2019-10-01 DIAGNOSIS — K21.9 GASTROESOPHAGEAL REFLUX DISEASE WITHOUT ESOPHAGITIS: Primary | ICD-10-CM

## 2019-10-01 DIAGNOSIS — Z00.00 WELLNESS EXAMINATION: ICD-10-CM

## 2019-10-01 PROCEDURE — 99396 PREV VISIT EST AGE 40-64: CPT | Performed by: FAMILY MEDICINE

## 2019-10-01 NOTE — PROGRESS NOTES
Assessment/Plan:    No problem-specific Assessment & Plan notes found for this encounter  Diagnoses and all orders for this visit:    Gastroesophageal reflux disease without esophagitis    Allergic rhinitis, unspecified seasonality, unspecified trigger    Eczema of left external ear    Other orders  -     Lipid panel  -     Hemoglobin A1C           Subjective:      Patient ID: Nisha Pond is a 47 y o  female  Wellness exam :    Paps/ mammos / colon all UTD     Exercise : not to goal     FH/ SH reviewed allOK       The following portions of the patient's history were reviewed and updated as appropriate: allergies, current medications, past family history, past medical history, past social history, past surgical history and problem list     Review of Systems   Constitutional: Negative for activity change and appetite change  HENT: Negative for voice change  Eyes: Negative for visual disturbance  Respiratory: Negative for chest tightness and shortness of breath  Cardiovascular: Negative for chest pain, palpitations and leg swelling  Gastrointestinal: Negative for abdominal pain, blood in stool, constipation and diarrhea  Genitourinary: Negative for dysuria, vaginal bleeding and vaginal discharge  Skin: Negative for rash  Neurological: Negative for dizziness  Psychiatric/Behavioral: Negative for dysphoric mood  Objective:  Vitals:    10/01/19 1505   Pulse: 92   Temp: 98 6 °F (37 °C)   TempSrc: Temporal   SpO2: 99%   Weight: 65 9 kg (145 lb 3 2 oz)   Height: 5' (1 524 m)      Physical Exam   Constitutional: She appears well-developed and well-nourished  HENT:   Head: Normocephalic and atraumatic  Eyes: Conjunctivae are normal    Neck: Neck supple  No thyromegaly present  Cardiovascular: Normal rate, regular rhythm, normal heart sounds and intact distal pulses  No murmur heard  Pulmonary/Chest: Effort normal and breath sounds normal  No respiratory distress  Musculoskeletal: She exhibits no edema  Lymphadenopathy:     She has no cervical adenopathy  Skin: Skin is warm and dry  Psychiatric: She has a normal mood and affect  Her behavior is normal      some eczematoid changes are noted in the preauricular and canal of the left year        Patient's chronic problems that were reviewed today are stable  Meds reviewed and no changes made  Appropriate labs and imaging were ordered  Preventive measures appropriate for age and sex were reviewed with patient  Immunizations were updated as appropriate

## 2019-10-01 NOTE — PATIENT INSTRUCTIONS
CURRENT [employer] TIPS ON EXERCISE:    IF YOU HAVE CONDITIONS THAT PREVENT AEROBIC EXERCISE:    WALK 30 MINUTES AT LEAST 5 DAYS PER WEEK; MAY BREAK IT UP INTO 10-  15 MINUTE SESSIONS   GET SOME RESISTANCE EXERCISES USING THE MAJOR MUSCLE GROUPS 2-3 TIMES PER WEEK     IF YOU ARE PHYSICALLY ABLE:    TRY TO GET 10,000 STEPS 3 TIMES PER WEEK  PLUS  GO "ONLINE" TO CHECK [employer] HEART RATE FOR YOUR AGE AND DO AEROBIC EXERCISES (JOG/STATIONARY BIKE/ELLIPTICAL) FOR 20-30 MINUTES 2-3 TIMES PER WEEK  Flu shot in next 2 mos   Use the prescription hydrocortisone cream for several weeks on the left ear    If it does not resolve call me and we can get a stronger cream

## 2019-10-07 LAB
CHOLEST SERPL-MCNC: 183 MG/DL
CHOLEST/HDLC SERPL: 2.8 (CALC)
HBA1C MFR BLD: 5.3 % OF TOTAL HGB
HDLC SERPL-MCNC: 65 MG/DL
LDLC SERPL CALC-MCNC: 100 MG/DL (CALC)
NONHDLC SERPL-MCNC: 118 MG/DL (CALC)
TRIGL SERPL-MCNC: 85 MG/DL

## 2019-10-08 ENCOUNTER — TELEPHONE (OUTPATIENT)
Dept: FAMILY MEDICINE CLINIC | Facility: CLINIC | Age: 54
End: 2019-10-08

## 2019-10-08 DIAGNOSIS — B00.9 HERPES: Primary | ICD-10-CM

## 2019-10-08 RX ORDER — VALACYCLOVIR HYDROCHLORIDE 1 G/1
1000 TABLET, FILM COATED ORAL 2 TIMES DAILY
Qty: 30 TABLET | Refills: 1 | Status: SHIPPED | OUTPATIENT
Start: 2019-10-08 | End: 2021-06-09 | Stop reason: SDUPTHER

## 2019-10-08 NOTE — TELEPHONE ENCOUNTER
Patient left a voicemail requesting refill on Valtrex 1g - states she is starting an outbreak  Last given 2009 -  Valtrex 1 g # 16 take 2 tablets BID   [facility]

## 2020-01-02 DIAGNOSIS — Z12.11 ENCOUNTER FOR SCREENING FOR COLORECTAL MALIGNANT NEOPLASM: Primary | ICD-10-CM

## 2020-01-02 DIAGNOSIS — Z12.12 ENCOUNTER FOR SCREENING FOR COLORECTAL MALIGNANT NEOPLASM: Primary | ICD-10-CM

## 2020-01-22 ENCOUNTER — TELEPHONE (OUTPATIENT)
Dept: FAMILY MEDICINE CLINIC | Facility: CLINIC | Age: 55
End: 2020-01-22

## 2020-01-22 NOTE — TELEPHONE ENCOUNTER
Jacoby Mcnamara Hidden  therapy for wrist completed  Area still inflamed  Cream not much help   Can she get soimewthing for inflammation?   Aleve does not help [facility]

## 2020-01-24 ENCOUNTER — TELEPHONE (OUTPATIENT)
Dept: FAMILY MEDICINE CLINIC | Facility: CLINIC | Age: 55
End: 2020-01-24

## 2020-01-24 NOTE — TELEPHONE ENCOUNTER
Spoke with patient she has been doing Physical Therapy  at [facility] in [place]  I requested the records placed in the bin   She states is it very inflamed please review thank you

## 2020-01-30 ENCOUNTER — TELEPHONE (OUTPATIENT)
Dept: FAMILY MEDICINE CLINIC | Facility: CLINIC | Age: 55
End: 2020-01-30

## 2020-03-31 ENCOUNTER — OFFICE VISIT (OUTPATIENT)
Dept: FAMILY MEDICINE CLINIC | Facility: CLINIC | Age: 55
End: 2020-03-31
Payer: COMMERCIAL

## 2020-03-31 VITALS
OXYGEN SATURATION: 99 % | WEIGHT: 141.3 LBS | HEIGHT: 60 IN | HEART RATE: 100 BPM | DIASTOLIC BLOOD PRESSURE: 80 MMHG | SYSTOLIC BLOOD PRESSURE: 120 MMHG | BODY MASS INDEX: 27.74 KG/M2 | TEMPERATURE: 97.6 F

## 2020-03-31 DIAGNOSIS — K21.9 GASTROESOPHAGEAL REFLUX DISEASE WITHOUT ESOPHAGITIS: ICD-10-CM

## 2020-03-31 PROCEDURE — 1036F TOBACCO NON-USER: CPT | Performed by: FAMILY MEDICINE

## 2020-03-31 PROCEDURE — 99214 OFFICE O/P EST MOD 30 MIN: CPT | Performed by: FAMILY MEDICINE

## 2020-03-31 PROCEDURE — 3008F BODY MASS INDEX DOCD: CPT | Performed by: FAMILY MEDICINE

## 2020-03-31 RX ORDER — OMEPRAZOLE 20 MG/1
20 CAPSULE, DELAYED RELEASE ORAL DAILY
COMMUNITY
End: 2020-03-31

## 2020-03-31 RX ORDER — OMEPRAZOLE 40 MG/1
40 CAPSULE, DELAYED RELEASE ORAL
Qty: 30 CAPSULE | Refills: 12 | Status: SHIPPED | OUTPATIENT
Start: 2020-03-31 | End: 2022-02-17

## 2020-03-31 NOTE — PROGRESS NOTES
Assessment/Plan:    No problem-specific Assessment & Plan notes found for this encounter  Diagnoses and all orders for this visit:    BMI 27 0-27 9,adult    Gastroesophageal reflux disease without esophagitis    Other orders  -     omeprazole (PriLOSEC) 20 mg delayed release capsule; Take 20 mg by mouth daily          Subjective:      Patient ID: Sindhu Hamm is a 54 y o  female  Patient's chronic problems that were reviewed today are stable  Recent hospital stays reviewed  Recent labs and imaging reviewed  Recent visits to other providers reviewed  Meds reviewed and no changes made  Appropriate labs and imaging were ordered  Preventive measures appropriate for age and sex were reviewed with patient  Immunizations were updated as appropriate  Dry mouth / GERD worse   Stress higher   Mom w/ colon polyps ; had colon exam 5 yrs ago  mammo OK     Paps Ok           The following portions of the patient's history were reviewed and updated as appropriate: allergies, current medications, past family history, past medical history, past social history, past surgical history and problem list     Review of Systems   Constitutional: Negative for activity change and appetite change  HENT: Negative for voice change  Eyes: Negative for visual disturbance  Respiratory: Negative for chest tightness and shortness of breath  Cardiovascular: Negative for chest pain, palpitations and leg swelling  Gastrointestinal: Negative for abdominal pain, blood in stool, constipation and diarrhea  Genitourinary: Negative for dysuria, vaginal bleeding and vaginal discharge  Skin: Negative for rash  Neurological: Negative for dizziness  Psychiatric/Behavioral: Negative for dysphoric mood           Objective:  Vitals:    03/31/20 1034   BP: 120/80   BP Location: Left arm   Patient Position: Sitting   Cuff Size: Adult   Pulse: 100   Temp: 97 6 °F (36 4 °C)   TempSrc: Temporal   SpO2: 99%   Weight: 64 1 kg (141 lb 4 8 oz)   Height: 5' (1 524 m)      Physical Exam   Constitutional: She appears well-developed and well-nourished  HENT:   Head: Normocephalic and atraumatic  Eyes: Conjunctivae are normal    Neck: Neck supple  No thyromegaly present  Cardiovascular: Normal rate, regular rhythm, normal heart sounds and intact distal pulses  No murmur heard  Pulmonary/Chest: Effort normal and breath sounds normal  No respiratory distress  Musculoskeletal: She exhibits no edema  Lymphadenopathy:     She has no cervical adenopathy  Skin: Skin is warm and dry  Psychiatric: She has a normal mood and affect  Her behavior is normal          Story is consistent with acute exacerbation of chronic GERD  She was never tested for H pylori but I do not think that is necessary just yet  She has no red flags signals to suggest that we need an upper endoscopy  We will double the PPI use for a full month with dietary changes

## 2020-03-31 NOTE — PATIENT INSTRUCTIONS
Weight Management   AMBULATORY CARE:   Why it is important to manage your weight:  Being overweight increases your risk of health conditions such as heart disease, high blood pressure, type 2 diabetes, and certain types of cancer  It can also increase your risk for osteoarthritis, sleep apnea, and other respiratory problems  Aim for a slow, steady weight loss  Even a small amount of weight loss can lower your risk of health problems  How to lose weight safely:  A safe and healthy way to lose weight is to eat fewer calories and get regular exercise  You can lose up about 1 pound a week by decreasing the number of calories you eat by 500 calories each day  You can decrease calories by eating smaller portion sizes or by cutting out high-calorie foods  Read labels to find out how many calories are in the foods you eat  You can also burn calories with exercise such as walking, swimming, or biking  You will be more likely to keep weight off if you make these changes part of your lifestyle  Healthy meal plan for weight management:  A healthy meal plan includes a variety of foods, contains fewer calories, and helps you stay healthy  A healthy meal plan includes the following:  · Eat whole-grain foods more often  A healthy meal plan should contain fiber  Fiber is the part of grains, fruits, and vegetables that is not broken down by your body  Whole-grain foods are healthy and provide extra fiber in your diet  Some examples of whole-grain foods are whole-wheat breads and pastas, oatmeal, brown rice, and bulgur  · Eat a variety of vegetables every day  Include dark, leafy greens such as spinach, kale, wan greens, and mustard greens  Eat yellow and orange vegetables such as carrots, sweet potatoes, and winter squash  · Eat a variety of fruits every day  Choose fresh or canned fruit (canned in its own juice or light syrup) instead of juice  Fruit juice has very little or no fiber  · Eat low-fat dairy foods  Drink fat-free (skim) milk or 1% milk  Eat fat-free yogurt and low-fat cottage cheese  Try low-fat cheeses such as mozzarella and other reduced-fat cheeses  · Choose meat and other protein foods that are low in fat  Choose beans or other legumes such as split peas or lentils  Choose fish, skinless poultry (chicken or turkey), or lean cuts of red meat (beef or pork)  Before you cook meat or poultry, cut off any visible fat  · Use less fat and oil  Try baking foods instead of frying them  Add less fat, such as margarine, sour cream, regular salad dressing and mayonnaise to foods  Eat fewer high-fat foods  Some examples of high-fat foods include french fries, doughnuts, ice cream, and cakes  · Eat fewer sweets  Limit foods and drinks that are high in sugar  This includes candy, cookies, regular soda, and sweetened drinks  Ways to decrease calories:   · Eat smaller portions  ¨ Use a small plate with smaller servings  ¨ Do not eat second helpings  ¨ When you eat at a restaurant, ask for a box and place half of your meal in the box before you eat  ¨ Share an entrée with someone else  · Replace high-calorie snacks with healthy, low-calorie snacks  ¨ Choose fresh fruit, vegetables, fat-free rice cakes, or air-popped popcorn instead of potato chips, nuts, or chocolate  ¨ Choose water or calorie-free drinks instead of soda or sweetened drinks  · Eat regular meals  Skipping meals can lead to overeating later in the day  Eat a healthy snack in place of a meal if you do not have time to eat a regular meal      · Do not shop for groceries when you are hungry  You may be more likely to make unhealthy food choices  Take a grocery list of healthy foods and shop after you have eaten  Exercise:  Exercise at least 30 minutes per day on most days of the week  Some examples of exercise include walking, biking, dancing, and swimming   You can also fit in more physical activity by taking the stairs instead of the elevator or parking farther away from stores  Ask your healthcare provider about the best exercise plan for you  Other things to consider as you try to lose weight:   · Be aware of situations that may give you the urge to overeat, such as eating while watching television  Find ways to avoid these situations  For example, read a book, go for a walk, or do crafts  · Meet with a weight loss support group or friends who are also trying to lose weight  This may help you stay motivated to continue working on your weight loss goals  © 2017 [address] [facility] Information is for End User's use only and may not be sold, redistributed or otherwise used for commercial purposes  All illustrations and images included in CareNotes® are the copyrighted property of A D A M , Inc  or Amanuel Calderon  The above information is an  only  It is not intended as medical advice for individual conditions or treatments  Talk to your doctor, nurse or pharmacist before following any medical regimen to see if it is safe and effective for you  Heart Healthy Diet   AMBULATORY CARE:   A heart healthy diet  is an eating plan low in total fat, unhealthy fats, and sodium (salt)  A heart healthy diet helps decrease your risk for heart disease and stroke  Limit the amount of fat you eat to 25% to 35% of your total daily calories  Limit sodium to less than 2,300 mg each day  Healthy fats:  Healthy fats can help improve cholesterol levels  The risk for heart disease is decreased when cholesterol levels are normal  Choose healthy fats, such as the following:  · Unsaturated fat  is found in foods such as soybean, canola, olive, corn, and safflower oils  It is also found in soft tub margarine that is made with liquid vegetable oil  · Omega-3 fat  is found in certain fish, such as salmon, tuna, and trout, and in walnuts and flaxseed    Unhealthy fats:  Unhealthy fats can cause unhealthy cholesterol levels in your blood and increase your risk of heart disease  Limit unhealthy fats, such as the following:  · Cholesterol  is found in animal foods, such as eggs and lobster, and in dairy products made from whole milk  Limit cholesterol to less than 300 milligrams (mg) each day  You may need to limit cholesterol to 200 mg each day if you have heart disease  · Saturated fat  is found in meats, such as lee and hamburger  It is also found in chicken or turkey skin, whole milk, and butter  Limit saturated fat to less than 7% of your total daily calories  Limit saturated fat to less than 6% if you have heart disease or are at increased risk for it  · Trans fat  is found in packaged foods, such as potato chips and cookies  It is also in hard margarine, some fried foods, and shortening  Avoid trans fats as much as possible    Heart healthy foods and drinks to include:  Ask your dietitian or healthcare provider how many servings to have from each of the following food groups:  · Grains:      ¨ Whole-wheat breads, cereals, and pastas, and brown rice    ¨ Low-fat, low-sodium crackers and chips    · Vegetables:      ¨ Broccoli, green beans, green peas, and spinach    ¨ Collards, kale, and lima beans    ¨ Carrots, sweet potatoes, tomatoes, and peppers    ¨ Canned vegetables with no salt added    · Fruits:      ¨ Bananas, peaches, pears, and pineapple    ¨ Grapes, raisins, and dates    ¨ Oranges, tangerines, grapefruit, orange juice, and grapefruit juice    ¨ Apricots, mangoes, melons, and papaya    ¨ Raspberries and strawberries    ¨ Canned fruit with no added sugar    · Low-fat dairy products:      ¨ Nonfat (skim) milk, 1% milk, and low-fat almond, cashew, or soy milks fortified with calcium    ¨ Low-fat cheese, regular or frozen yogurt, and cottage cheese    · Meats and proteins , such as lean cuts of beef and pork (loin, leg, round), skinless chicken and turkey, legumes, soy products, egg whites, and nuts  Foods and drinks to limit or avoid:  Ask your dietitian or healthcare provider about these and other foods that are high in unhealthy fat, sodium, and sugar:  · Snack or packaged foods , such as frozen dinners, cookies, macaroni and cheese, and cereals with more than 300 mg of sodium per serving    · Canned or dry mixes  for cakes, soups, sauces, or gravies    · Vegetables with added sodium , such as instant potatoes, vegetables with added sauces, or regular canned vegetables    · Other foods high in sodium , such as ketchup, barbecue sauce, salad dressing, pickles, olives, soy sauce, and miso    · High-fat dairy foods  such as whole or 2% milk, cream cheese, or sour cream, and cheeses     · High-fat protein foods  such as high-fat cuts of beef (T-bone steaks, ribs), chicken or turkey with skin, and organ meats, such as liver    · Cured or smoked meats , such as hot dogs, lee, and sausage    · Unhealthy fats and oils , such as butter, stick margarine, shortening, and cooking oils such as coconut or palm oil    · Food and drinks high in sugar , such as soft drinks (soda), sports drinks, sweetened tea, candy, cake, cookies, pies, and doughnuts  Other diet guidelines to follow:   · Eat more foods containing omega-3 fats  Eat fish high in omega-3 fats at least 2 times a week  · Limit alcohol  Too much alcohol can damage your heart and raise your blood pressure  Women should limit alcohol to 1 drink a day  Men should limit alcohol to 2 drinks a day  A drink of alcohol is 12 ounces of beer, 5 ounces of wine, or 1½ ounces of liquor  · Choose low-sodium foods  High-sodium foods can lead to high blood pressure  Add little or no salt to food you prepare  Use herbs and spices in place of salt  · Eat more fiber  to help lower cholesterol levels  Eat at least 5 servings of fruits and vegetables each day  Eat 3 ounces of whole-grain foods each day  Legumes (beans) are also a good source of fiber    Lifestyle guidelines: · Do not smoke  Nicotine and other chemicals in cigarettes and cigars can cause lung and heart damage  Ask your healthcare provider for information if you currently smoke and need help to quit  E-cigarettes or smokeless tobacco still contain nicotine  Talk to your healthcare provider before you use these products  · Exercise regularly  to help you maintain a healthy weight and improve your blood pressure and cholesterol levels  Ask your healthcare provider about the best exercise plan for you  Do not start an exercise program without asking your healthcare provider  Follow up with your healthcare provider as directed:  Write down your questions so you remember to ask them during your visits  © 2017 [address] [employer] Information is for End User's use only and may not be sold, redistributed or otherwise used for commercial purposes  All illustrations and images included in CareNotes® are the copyrighted property of [employer]  or Amanuel Calderon  The above information is an  only  It is not intended as medical advice for individual conditions or treatments  Talk to your doctor, nurse or pharmacist before following any medical regimen to see if it is safe and effective for you  Calorie Counting Diet   WHAT YOU NEED TO KNOW:   What is a calorie counting diet? It is a meal plan based on counting calories each day to reach a healthy body weight  You will need to eat fewer calories if you are trying to lose weight  Weight loss may decrease your risk for certain health problems or improve your health if you have health problems  Some of these health problems include heart disease, high blood pressure, and diabetes  What foods should I avoid? Your dietitian will tell you if you need to avoid certain foods based on your body weight and health condition  You may need to avoid high-fat foods if you are at risk for or have heart disease   You may need to eat fewer foods from the breads and starches food group if you have diabetes  How many calories are in foods? The following is a list of foods and drinks with the approximate number of calories in each  Check the food label to find the exact number of calories  A dietitian can tell you how many calories you should have from each food group each day    · Carbohydrate:      ¨ ½ of a 3-inch bagel, 1 slice of bread, or ½ of a hamburger bun or hot dog bun (80)    ¨ 1 (8-inch) flour tortilla or ½ cup of cooked rice (100)    ¨ 1 (6-inch) corn tortilla (80)    ¨ 1 (6-inch) pancake or 1 cup of bran flakes cereal (110)    ¨ ½ cup of cooked cereal (80)    ¨ ½ cup of cooked pasta (85)    ¨ 1 ounce of pretzels (100)    ¨ 3 cups of air-popped popcorn without butter or oil (80)    · Dairy:      ¨ 1 cup of skim or 1% milk (90)    ¨ 1 cup of 2% milk (120)    ¨ 1 cup of whole milk (160)    ¨ 1 cup of 2% chocolate milk (220)    ¨ 1 ounce of low-fat cheese with 3 grams of fat per ounce (70)    ¨ 1 ounce of cheddar cheese (114)    ¨ ½ cup of 1% fat cottage cheese (80)    ¨ 1 cup of plain or sugar-free, fat-free yogurt (90)    · Protein foods:      ¨ 3 ounces of fish (not breaded or fried) (95)    ¨ 3 ounces of breaded, fried fish (195)    ¨ ¾ cup of tuna canned in water (105)    ¨ 3 ounces of chicken breast without skin (105)    ¨ 1 fried chicken breast with skin (350)    ¨ ¼ cup of fat free egg substitute (40)    ¨ 1 large egg (75)    ¨ 3 ounces of lean beef or pork (165)    ¨ 3 ounces of fried pork chop or ham (185)    ¨ ½ cup of cooked dried beans, such as kidney, harrison, lentils, or navy (115)    ¨ 3 ounces of bologna or lunch meat (225)    ¨ 2 links of breakfast sausage (140)    · Vegetables:      ¨ ½ cup of sliced mushrooms (10)    ¨ 1 cup of salad greens, such as lettuce, spinach, or tracy (15)    ¨ ½ cup of steamed asparagus (20)    ¨ ½ cup of cooked summer squash, zucchini squash, or green or wax beans (25)    ¨ 1 cup of broccoli or cauliflower florets, or 1 medium tomato (25)    ¨ 1 large raw carrot or ½ cup of cooked carrots (40)    ¨ ? of a medium cucumber or 1 stalk of celery (5)    ¨ 1 small baked potato (160)    ¨ 1 cup of breaded, fried vegetables (230)    · Fruit:      ¨ 1 (6-inch) banana (55)     ¨ ½ of a 4-inch grapefruit (55)    ¨ 15 grapes (60)    ¨ 1 medium orange or apple (70)    ¨ 1 large peach (65)    ¨ 1 cup of fresh pineapple chunks (75)    ¨ 1 cup of melon cubes (50)    ¨ 1¼ cups of whole strawberries (45)    ¨ ½ cup of fruit canned in juice (55)    ¨ ½ cup of fruit canned in heavy syrup (110)    ¨ ?  cup of raisins (130)    ¨ ½ cup of unsweetened fruit juice (60)    ¨ ½ cup of grape, cranberry, or prune juice (90)    · Fat:      ¨ 10 peanuts or 2 teaspoons of peanut butter (55)    ¨ 2 tablespoons of avocado or 1 tablespoon of regular salad dressing (45)    ¨ 2 slices of lee (90)    ¨ 1 teaspoon of oil, such as safflower, canola, corn, or olive oil (45)    ¨ 2 teaspoons of low-fat margarine, or 1 tablespoon of low-fat mayonnaise (50)    ¨ 1 teaspoon of regular margarine (40)    ¨ 1 tablespoon of regular mayonnaise (135)    ¨ 1 tablespoon of cream cheese or 2 tablespoons of low-fat cream cheese (45)    ¨ 2 tablespoons of vegetable shortening (215)    · Dessert and sweets:      ¨ 8 animal crackers or 5 vanilla wafers (80)    ¨ 1 frozen fruit juice bar (80)    ¨ ½ cup of ice milk or low-fat frozen yogurt (90)    ¨ ½ cup of sherbet or sorbet (125)    ¨ ½ cup of sugar-free pudding or custard (60)    ¨ ½ cup of ice cream (140)    ¨ ½ cup of pudding or custard (175)    ¨ 1 (2-inch) square chocolate brownie (185)    · Combination foods:      ¨ Bean burrito made with an 8-inch tortilla, without cheese (275)    ¨ Chicken breast sandwich with lettuce and tomato (325)    ¨ 1 cup of chicken noodle soup (60)    ¨ 1 beef taco (175)    ¨ Regular hamburger with lettuce and tomato (310)    ¨ Regular cheeseburger with lettuce and tomato (410)     ¨ ¼ of a 12-inch cheese pizza (280)    ¨ Fried fish sandwich with lettuce and tomato (425)    ¨ Hot dog and bun (275)    ¨ 1½ cups of macaroni and cheese (310)    ¨ Taco salad with a fried tortilla shell (870)    · Low-calorie foods:      ¨ 1 tablespoon of ketchup or 1 tablespoon of fat free sour cream (15)    ¨ 1 teaspoon of mustard (5)    ¨ ¼ cup of salsa (20)    ¨ 1 large dill pickle (15)    ¨ 1 tablespoon of fat free salad dressing (10)    ¨ 2 teaspoons of low-sugar, light jam or jelly, or 1 tablespoon of sugar-free syrup (15)    ¨ 1 sugar-free popsicle (15)    ¨ 1 cup of club soda, seltzer water, or diet soda (0)  CARE AGREEMENT:   You have the right to help plan your care  Discuss treatment options with your caregivers to decide what care you want to receive  You always have the right to refuse treatment  The above information is an  only  It is not intended as medical advice for individual conditions or treatments  Talk to your doctor, nurse or pharmacist before following any medical regimen to see if it is safe and effective for you  © 2017 [address]  Information is for End User's use only and may not be sold, redistributed or otherwise used for commercial purposes  All illustrations and images included in CareNotes® are the copyrighted property of [employer]  or Amanuel Calderon  Gastroesophageal Reflux Disease   WHAT YOU NEED TO KNOW:   What is gastroesophageal reflux disease? Gastroesophageal reflux occurs when acid and food in the stomach back up into the esophagus  Gastroesophageal reflux disease (GERD) is reflux that occurs more than twice a week for a few weeks  It usually causes heartburn and other symptoms  GERD can cause other health problems over time if it is not treated  What causes GERD? GERD often occurs when the lower muscle (sphincter) of the esophagus does not close properly  The sphincter normally opens to let food into the stomach   It then closes to keep food and stomach acid in the stomach  If the sphincter does not close properly, stomach acid and food back up (reflux) into the esophagus  The following may increase your risk for GERD:  · Certain foods such as spicy foods, chocolate, foods that contain caffeine, peppermint, and fried foods    · Hiatal hernia    · Certain medicines such as calcium channel blockers (used to treat high blood pressure), allergy medicines, sedatives, or antidepressants    · Pregnancy or obesity    · Lying down after a meal    · Drinking alcohol or smoking  What are the signs and symptoms of GERD? Heartburn is the most common symptom of GERD  You may feel burning pain in your chest or below the breast bone  This usually occurs after meals and spreads to your neck, jaw, or shoulder  The pain gets better when you change positions  You may also have any of the following:  · Bitter or acid taste in your mouth    · Dry cough    · Trouble swallowing or pain with swallowing    · Hoarseness or sore throat    · Frequent burping or hiccups    · Feeling of fullness soon after you start eating  How is GERD diagnosed? Your healthcare provider will ask about your symptoms and when they started  Tell him or her about other medical conditions you have, your eating habits, and your activities  You may also need any of the following:  · Esophageal pH monitoring  is used to place a small probe inside your esophagus and stomach to check the amount of acid  · An endoscopy  is a procedure used to look at the inside of your esophagus and stomach  An endoscope is a bendable tube with a light and camera on the end  Your healthcare provider may remove a small sample of tissue and send it to a lab for tests  · Upper GI x-rays  are done to take pictures of your stomach and intestines (bowel)  You may be given a chalky liquid to drink before the pictures are taken  This liquid helps your stomach and intestines show up better on the x-rays  · Esophageal manometry  is a test that shows how your esophagus pushes food and fluid to your stomach  It also shows the pressures in your esophagus and stomach  It may show a hiatal hernia  How is GERD treated? · Medicines  are used to decrease stomach acid  Medicine may also be used to help your lower esophageal sphincter and stomach contract (tighten) more  · Surgery  is done to wrap the upper part of the stomach around the esophageal sphincter  This will strengthen the sphincter and prevent reflux  How can I help manage GERD? · Do not have foods or drinks that may increase heartburn  These include chocolate, peppermint, fried or fatty foods, drinks that contain caffeine, or carbonated drinks (soda)  Other foods include spicy foods, onions, tomatoes, and tomato-based foods  Do not have foods or drinks that can irritate your esophagus, such as citrus fruits, juices, and alcohol  · Do not eat large meals  When you eat a lot of food at one time, your stomach needs more acid to digest it  Eat 6 small meals each day instead of 3 large ones, and eat slowly  Do not eat meals 2 to 3 hours before bedtime  · Elevate the head of your bed  Place 6-inch blocks under the head of your bed frame  You may also use more than one pillow under your head and shoulders while you sleep  · Maintain a healthy weight  If you are overweight, weight loss may help relieve symptoms of GERD  · Do not smoke  Smoking weakens the lower esophageal sphincter and increases the risk of GERD  Ask your healthcare provider for information if you currently smoke and need help to quit  E-cigarettes or smokeless tobacco still contain nicotine  Talk to your healthcare provider before you use these products  · Do not wear clothing that is tight around your waist   Tight clothing can put pressure on your stomach and cause or worsen GERD symptoms  When should I seek immediate care?    · You feel full and cannot burp or vomit     · You have severe chest pain and sudden trouble breathing  · Your bowel movements are black, bloody, or tarry-looking  · Your vomit looks like coffee grounds or has blood in it  When should I contact my healthcare provider? · You vomit large amounts, or you vomit often  · You have trouble breathing after you vomit  · You have trouble swallowing, or pain with swallowing  · You are losing weight without trying  · Your symptoms get worse or do not improve with treatment  · You have questions or concerns about your condition or care  CARE AGREEMENT:   You have the right to help plan your care  Learn about your health condition and how it may be treated  Discuss treatment options with your caregivers to decide what care you want to receive  You always have the right to refuse treatment  The above information is an  only  It is not intended as medical advice for individual conditions or treatments  Talk to your doctor, nurse or pharmacist before following any medical regimen to see if it is safe and effective for you  © 2017 [address] Information is for End User's use only and may not be sold, redistributed or otherwise used for commercial purposes  All illustrations and images included in CareNotes® are the copyrighted property of [employer]  or [employer]  Consider a colonoscopy this year given the family history  WE RECOMMEND GETTING THE 2- DOSE SHINGLES VACCINE ([address] [facility] [address]) FROM YOUR PHARMACY  CHECK WITH THEM ON THE COST  YOU SHOULD HAVE THIS IF OVER AGE 48, EVEN IF YOU HAVE HAD THE ORIGINAL VACCINE (ZOSTRIX)

## 2020-03-31 NOTE — PROGRESS NOTES
BMI Counseling: Body mass index is 27 6 kg/m²  The BMI is above normal  Nutrition recommendations include reducing portion sizes, decreasing overall calorie intake, 3-5 servings of fruits/vegetables daily, reducing fast food intake, consuming healthier snacks, decreasing soda and/or juice intake, moderation in carbohydrate intake, increasing intake of lean protein, reducing intake of saturated fat and trans fat and reducing intake of cholesterol  Exercise recommendations include exercising 3-5 times per week

## 2020-05-15 ENCOUNTER — TRANSCRIBE ORDERS (OUTPATIENT)
Dept: ADMINISTRATIVE | Facility: HOSPITAL | Age: 55
End: 2020-05-15

## 2020-05-15 DIAGNOSIS — Z12.31 VISIT FOR SCREENING MAMMOGRAM: Primary | ICD-10-CM

## 2020-05-21 ENCOUNTER — ANNUAL EXAM (OUTPATIENT)
Dept: GYNECOLOGY | Facility: CLINIC | Age: 55
End: 2020-05-21
Payer: COMMERCIAL

## 2020-05-21 VITALS
WEIGHT: 137 LBS | SYSTOLIC BLOOD PRESSURE: 100 MMHG | HEART RATE: 101 BPM | BODY MASS INDEX: 26.9 KG/M2 | HEIGHT: 60 IN | DIASTOLIC BLOOD PRESSURE: 62 MMHG

## 2020-05-21 DIAGNOSIS — Z12.31 ENCOUNTER FOR SCREENING MAMMOGRAM FOR MALIGNANT NEOPLASM OF BREAST: Primary | ICD-10-CM

## 2020-05-21 DIAGNOSIS — Z01.419 ENCOUNTER FOR GYNECOLOGICAL EXAMINATION WITHOUT ABNORMAL FINDING: Primary | ICD-10-CM

## 2020-05-21 DIAGNOSIS — D21.9 FIBROIDS: ICD-10-CM

## 2020-05-21 DIAGNOSIS — Z12.4 ENCOUNTER FOR PAPANICOLAOU SMEAR FOR CERVICAL CANCER SCREENING: ICD-10-CM

## 2020-05-21 PROCEDURE — 3008F BODY MASS INDEX DOCD: CPT | Performed by: OBSTETRICS & GYNECOLOGY

## 2020-05-21 PROCEDURE — G0145 SCR C/V CYTO,THINLAYER,RESCR: HCPCS | Performed by: OBSTETRICS & GYNECOLOGY

## 2020-05-21 PROCEDURE — 3008F BODY MASS INDEX DOCD: CPT | Performed by: FAMILY MEDICINE

## 2020-05-21 PROCEDURE — S0612 ANNUAL GYNECOLOGICAL EXAMINA: HCPCS | Performed by: OBSTETRICS & GYNECOLOGY

## 2020-05-21 PROCEDURE — 76977 US BONE DENSITY MEASURE: CPT | Performed by: OBSTETRICS & GYNECOLOGY

## 2020-05-29 ENCOUNTER — TELEPHONE (OUTPATIENT)
Dept: GYNECOLOGY | Facility: CLINIC | Age: 55
End: 2020-05-29

## 2020-05-29 LAB
LAB AP GYN PRIMARY INTERPRETATION: NORMAL
Lab: NORMAL

## 2020-06-02 ENCOUNTER — DOCUMENTATION (OUTPATIENT)
Dept: GYNECOLOGY | Facility: CLINIC | Age: 55
End: 2020-06-02

## 2020-06-02 DIAGNOSIS — N95.1 HOT FLASHES DUE TO MENOPAUSE: Primary | ICD-10-CM

## 2020-06-10 ENCOUNTER — TELEPHONE (OUTPATIENT)
Dept: GYNECOLOGY | Facility: CLINIC | Age: 55
End: 2020-06-10

## 2020-06-13 ENCOUNTER — HOSPITAL ENCOUNTER (OUTPATIENT)
Dept: MAMMOGRAPHY | Facility: CLINIC | Age: 55
Discharge: HOME | End: 2020-06-13
Payer: COMMERCIAL

## 2020-06-13 VITALS — WEIGHT: 132 LBS | HEIGHT: 59 IN | BODY MASS INDEX: 26.61 KG/M2

## 2020-06-13 DIAGNOSIS — Z12.31 ENCOUNTER FOR SCREENING MAMMOGRAM FOR MALIGNANT NEOPLASM OF BREAST: ICD-10-CM

## 2020-06-13 PROCEDURE — 77067 SCR MAMMO BI INCL CAD: CPT

## 2020-06-13 PROCEDURE — 77063 BREAST TOMOSYNTHESIS BI: CPT

## 2020-09-25 ENCOUNTER — TELEPHONE (OUTPATIENT)
Dept: GYNECOLOGY | Facility: CLINIC | Age: 55
End: 2020-09-25

## 2020-09-25 NOTE — TELEPHONE ENCOUNTER
Patient called to state that her heel scan from (05/21/20) has not been paid insurance company is still waiting for an explanation of why this was done

## 2020-10-20 ENCOUNTER — OFFICE VISIT (OUTPATIENT)
Dept: FAMILY MEDICINE CLINIC | Facility: CLINIC | Age: 55
End: 2020-10-20
Payer: COMMERCIAL

## 2020-10-20 VITALS
WEIGHT: 131 LBS | DIASTOLIC BLOOD PRESSURE: 70 MMHG | SYSTOLIC BLOOD PRESSURE: 130 MMHG | HEIGHT: 61 IN | BODY MASS INDEX: 24.73 KG/M2 | OXYGEN SATURATION: 100 % | TEMPERATURE: 98.3 F | HEART RATE: 86 BPM

## 2020-10-20 DIAGNOSIS — J30.9 ALLERGIC RHINITIS, UNSPECIFIED SEASONALITY, UNSPECIFIED TRIGGER: ICD-10-CM

## 2020-10-20 DIAGNOSIS — Z23 IMMUNIZATION DUE: Primary | ICD-10-CM

## 2020-10-20 DIAGNOSIS — Z11.59 SCREENING FOR VIRAL DISEASE: ICD-10-CM

## 2020-10-20 DIAGNOSIS — K21.9 GASTROESOPHAGEAL REFLUX DISEASE WITHOUT ESOPHAGITIS: ICD-10-CM

## 2020-10-20 PROBLEM — H60.542 ECZEMA OF LEFT EXTERNAL EAR: Chronic | Status: RESOLVED | Noted: 2019-10-01 | Resolved: 2020-10-20

## 2020-10-20 PROCEDURE — 90471 IMMUNIZATION ADMIN: CPT

## 2020-10-20 PROCEDURE — 90682 RIV4 VACC RECOMBINANT DNA IM: CPT

## 2020-10-20 PROCEDURE — 99396 PREV VISIT EST AGE 40-64: CPT | Performed by: FAMILY MEDICINE

## 2021-03-23 ENCOUNTER — TELEPHONE (OUTPATIENT)
Dept: OTHER | Facility: OTHER | Age: 56
End: 2021-03-23

## 2021-03-23 ENCOUNTER — OFFICE VISIT (OUTPATIENT)
Dept: GYNECOLOGY | Facility: CLINIC | Age: 56
End: 2021-03-23
Payer: COMMERCIAL

## 2021-03-23 ENCOUNTER — NURSE TRIAGE (OUTPATIENT)
Dept: OTHER | Facility: OTHER | Age: 56
End: 2021-03-23

## 2021-03-23 VITALS
HEIGHT: 60 IN | SYSTOLIC BLOOD PRESSURE: 124 MMHG | HEART RATE: 96 BPM | BODY MASS INDEX: 25.8 KG/M2 | DIASTOLIC BLOOD PRESSURE: 64 MMHG | WEIGHT: 131.4 LBS

## 2021-03-23 DIAGNOSIS — N95.2 ATROPHIC VAGINITIS: Primary | ICD-10-CM

## 2021-03-23 DIAGNOSIS — N95.2 ATROPHIC VAGINITIS: ICD-10-CM

## 2021-03-23 DIAGNOSIS — M79.18 MYOFASCIAL PAIN: ICD-10-CM

## 2021-03-23 PROCEDURE — 3008F BODY MASS INDEX DOCD: CPT | Performed by: OBSTETRICS & GYNECOLOGY

## 2021-03-23 PROCEDURE — 1036F TOBACCO NON-USER: CPT | Performed by: OBSTETRICS & GYNECOLOGY

## 2021-03-23 PROCEDURE — 99213 OFFICE O/P EST LOW 20 MIN: CPT | Performed by: OBSTETRICS & GYNECOLOGY

## 2021-03-23 RX ORDER — ESTRADIOL 0.1 MG/G
CREAM VAGINAL
Qty: 42.5 G | Refills: 3 | Status: SHIPPED | OUTPATIENT
Start: 2021-03-23

## 2021-03-23 NOTE — PROGRESS NOTES
Assessment/Plan:         Diagnoses and all orders for this visit:    Atrophic vaginitis; had Estrace vaginal cream 1 g weekly    Myofascial pain; consider PT if no improvement with the Estrace vaginal cream    Other orders  -     Ascorbic Acid (RALPH-C PO); Take by mouth        Subjective:      Patient ID: Nisha Pond is a 64 y o  female  HPI patient presents to the office complaining of right-sided vaginal pain for the past 2 weeks  This is aggravated with exercise  She denies any vaginal bleeding discharge dysuria hematuria urgency or stress urinary incontinence  She denies any dyspareunia  No fever  No abdominal pain  She is presently on CombiPatch  The following portions of the patient's history were reviewed and updated as appropriate:   She  has a past medical history of Fibroids (1994)  She   Patient Active Problem List    Diagnosis Date Noted    Difficulty swallowing 11/07/2017    Dysphagia 11/07/2017    Allergic rhinitis 10/12/2017    Uterine leiomyoma 03/22/2016    Backache 01/29/2016    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    Gastroesophageal reflux disease 09/18/2015    Epidermoid cyst of skin 06/03/2015    Anisocoria 11/08/2011     She  has a past surgical history that includes Myomectomy; [place] tooth extraction; Esophagogastroduodenoscopy; and Other surgical history  Her family history includes Heart attack in her family; Hypertension in her mother; Kidney failure in her maternal uncle; Lung cancer (age of onset: 72) in her father; Lymphoma in her maternal grandmother; No Known Problems in her maternal aunt, maternal aunt, maternal aunt, maternal aunt, paternal aunt, paternal aunt, paternal aunt, and sister; Stroke in her family  She  reports that she has never smoked  She has never used smokeless tobacco  She reports current alcohol use  She reports that she does not use drugs    Current Outpatient Medications   Medication Sig Dispense Refill    Ascorbic Acid (RALPH-C PO) Take by mouth      cholecalciferol (VITAMIN D3) 1,000 units tablet use as directed      estradiol-norethindrone (COMBIPATCH) 0 05-0 14 MG/DAY Place 1 patch on the skin 2 (two) times a week 8 patch 11    hydrocortisone 2 5 % cream Apply topically 3 (three) times a day 15 g 2    KRILL OIL PO Take by mouth      loratadine-pseudoephedrine (CLARITIN-D 24-HOUR)  mg per 24 hr tablet Take 1 tablet by mouth daily      Multiple Vitamins-Minerals (MULTIVITAMIN ADULT PO) every 24 hours      valACYclovir (VALTREX) 1,000 mg tablet Take 1 tablet (1,000 mg total) by mouth 2 (two) times a day 30 tablet 1    omeprazole (PriLOSEC) 40 MG capsule Take 1 capsule (40 mg total) by mouth daily before breakfast (Patient not taking: Reported on 3/23/2021) 30 capsule 12     No current facility-administered medications for this visit  Current Outpatient Medications on File Prior to Visit   Medication Sig    Ascorbic Acid (RALPH-C PO) Take by mouth    cholecalciferol (VITAMIN D3) 1,000 units tablet use as directed    estradiol-norethindrone (COMBIPATCH) 0 05-0 14 MG/DAY Place 1 patch on the skin 2 (two) times a week    hydrocortisone 2 5 % cream Apply topically 3 (three) times a day    KRILL OIL PO Take by mouth    loratadine-pseudoephedrine (CLARITIN-D 24-HOUR)  mg per 24 hr tablet Take 1 tablet by mouth daily    Multiple Vitamins-Minerals (MULTIVITAMIN ADULT PO) every 24 hours    valACYclovir (VALTREX) 1,000 mg tablet Take 1 tablet (1,000 mg total) by mouth 2 (two) times a day    omeprazole (PriLOSEC) 40 MG capsule Take 1 capsule (40 mg total) by mouth daily before breakfast (Patient not taking: Reported on 3/23/2021)     No current facility-administered medications on file prior to visit  She is allergic to cefuroxime; cetirizine; fexofenadine; penicillins; sulfa antibiotics; and sulfanilamide       Review of Systems   Constitutional: Negative for fever  Gastrointestinal: Negative      Genitourinary: Positive for vaginal pain  Negative for difficulty urinating, dyspareunia, dysuria, frequency, genital sores, hematuria, pelvic pain, urgency, vaginal bleeding and vaginal discharge  Objective:      /64 (BP Location: Left arm)   Pulse 96   Ht 4' 11 5" (1 511 m)   Wt 59 6 kg (131 lb 6 4 oz)   LMP 07/03/2017   BMI 26 10 kg/m²          Physical Exam  Vitals signs reviewed  Abdominal:      General: There is no distension  Palpations: Abdomen is soft  There is no mass  Tenderness: There is no abdominal tenderness  There is no guarding or rebound  Hernia: No hernia is present  There is no hernia in the left inguinal area or right inguinal area  Genitourinary:     General: Normal vulva  Labia:         Right: No rash or tenderness  Left: No rash, tenderness or lesion  Vagina: Erythema present  Cervix: Normal       Uterus: Not deviated, not enlarged, not fixed, not tender and no uterine prolapse  Adnexa:         Right: No mass, tenderness or fullness  Left: No mass, tenderness or fullness  Comments: Tender and tight right levator anti muscles  Lymphadenopathy:      Lower Body: No right inguinal adenopathy  No left inguinal adenopathy  Neurological:      Mental Status: She is alert

## 2021-03-23 NOTE — TELEPHONE ENCOUNTER
Pt called her insurance will only pay for the brand name cream Estrace  Recalled RX to [facility] in [place] for cheapest price per pt

## 2021-03-23 NOTE — TELEPHONE ENCOUNTER
Regarding: Perscription assistance  ----- Message from Juanjo Mueller RN sent at 3/23/2021  5:04 PM EDT -----  "My GYN called in the brand but it needs the generic-so I can use the coupon and not use insurance"

## 2021-03-23 NOTE — TELEPHONE ENCOUNTER
Estradiol (ESTRACE) 0 1 mg/g vaginal cream was sent to [facility], and insurance won't cover it  According to patient, insurance will only cover brand name at [facility], but it will still cost a lot  [facility] states they did not receive prescription therefore they cannot transfer it to [facility]  Patient is asking if generic form of medication estradiol (ESTRACE) 0 1 mg/g vaginal cream  be sent to [address] [place] [address], as she has a coupon and it will cost much less than [facility]  Spoke with Dr Hany Finn, he advised patient call the office in AM   Patient made aware and verbalized understanding  Reason for Disposition   [1] Prescription not at pharmacy AND [2] was prescribed by PCP recently    Answer Assessment - Initial Assessment Questions  1  NAME of MEDICATION: "What medicine are you calling about?"     estradiol (ESTRACE) 0 1 mg/g vaginal cream   2  QUESTION: "What is your question?"      Would like prescription sent to [facility], as she has a coupon and it will cost less  Insurance doesn't cover it  3    PRESCRIBING HCP: "Who prescribed it?" Reason: if prescribed by specialist, call should be referred to that group  Dr Hany Finn  4  SYMPTOMS: "Do you have any symptoms?"      NA  5  SEVERITY: If symptoms are present, ask "Are they mild, moderate or severe?"     NA  6    PREGNANCY:  "Is there any chance that you are pregnant?" "When was your last menstrual period?"     NA    Protocols used: MEDICATION QUESTION CALL-ADULT-

## 2021-03-24 DIAGNOSIS — N95.2 ATROPHIC VAGINITIS: Primary | ICD-10-CM

## 2021-03-24 RX ORDER — ESTRADIOL 0.1 MG/G
1 CREAM VAGINAL WEEKLY
Qty: 42.5 G | Refills: 3 | Status: SHIPPED | OUTPATIENT
Start: 2021-03-24

## 2021-03-24 RX ORDER — ESTRADIOL 0.1 MG/G
CREAM VAGINAL
Qty: 42.5 G | Refills: 3 | OUTPATIENT
Start: 2021-03-24

## 2021-04-05 ENCOUNTER — TELEPHONE (OUTPATIENT)
Dept: GYNECOLOGY | Facility: CLINIC | Age: 56
End: 2021-04-05

## 2021-04-05 DIAGNOSIS — Z23 ENCOUNTER FOR IMMUNIZATION: ICD-10-CM

## 2021-04-09 ENCOUNTER — IMMUNIZATIONS (OUTPATIENT)
Dept: FAMILY MEDICINE CLINIC | Facility: HOSPITAL | Age: 56
End: 2021-04-09

## 2021-04-09 DIAGNOSIS — Z23 ENCOUNTER FOR IMMUNIZATION: Primary | ICD-10-CM

## 2021-04-09 PROCEDURE — 0011A SARS-COV-2 / COVID-19 MRNA VACCINE (MODERNA) 100 MCG: CPT

## 2021-04-09 PROCEDURE — 91301 SARS-COV-2 / COVID-19 MRNA VACCINE (MODERNA) 100 MCG: CPT

## 2021-04-12 ENCOUNTER — OFFICE VISIT (OUTPATIENT)
Dept: FAMILY MEDICINE CLINIC | Facility: CLINIC | Age: 56
End: 2021-04-12
Payer: COMMERCIAL

## 2021-04-12 VITALS
HEART RATE: 86 BPM | RESPIRATION RATE: 16 BRPM | SYSTOLIC BLOOD PRESSURE: 112 MMHG | BODY MASS INDEX: 26.03 KG/M2 | TEMPERATURE: 97.4 F | OXYGEN SATURATION: 98 % | WEIGHT: 132.6 LBS | DIASTOLIC BLOOD PRESSURE: 62 MMHG | HEIGHT: 60 IN

## 2021-04-12 DIAGNOSIS — Z11.59 NEED FOR HEPATITIS C SCREENING TEST: ICD-10-CM

## 2021-04-12 DIAGNOSIS — M25.561 ACUTE PAIN OF RIGHT KNEE: Primary | ICD-10-CM

## 2021-04-12 DIAGNOSIS — Z11.4 SCREENING FOR HIV (HUMAN IMMUNODEFICIENCY VIRUS): ICD-10-CM

## 2021-04-12 PROCEDURE — 1036F TOBACCO NON-USER: CPT | Performed by: NURSE PRACTITIONER

## 2021-04-12 PROCEDURE — 99214 OFFICE O/P EST MOD 30 MIN: CPT | Performed by: NURSE PRACTITIONER

## 2021-04-12 PROCEDURE — 3008F BODY MASS INDEX DOCD: CPT | Performed by: NURSE PRACTITIONER

## 2021-04-12 RX ORDER — NAPROXEN 500 MG/1
500 TABLET ORAL 2 TIMES DAILY WITH MEALS
Qty: 30 TABLET | Refills: 0 | Status: SHIPPED | OUTPATIENT
Start: 2021-04-12 | End: 2022-02-17

## 2021-04-12 NOTE — PROGRESS NOTES
Subjective:   Chief Complaint   Patient presents with    Knee Pain     Right leg        Patient ID: More Hoover is a 64 y o  female  Presents today for complaints of right knee pain for many years  Issue comes and goes but she has been exercising more and it is becoming more frequent  She did have a band that she put around her knee which seemed to help but she has since lost it  She has been using some Biofreeze which has some mixed results and helping  Denies any known injury to that knee      The following portions of the patient's history were reviewed and updated as appropriate: allergies, current medications, past family history, past medical history, past social history, past surgical history and problem list     Review of Systems   Constitutional: Negative for chills, fatigue and fever  Respiratory: Negative for cough and shortness of breath  Cardiovascular: Negative for leg swelling  Musculoskeletal: Positive for arthralgias (right knee) and gait problem (secondary to right knee pain)  Negative for joint swelling  Psychiatric/Behavioral: Negative for dysphoric mood  The patient is not nervous/anxious  Objective:  Vitals:    04/12/21 1530   BP: 112/62   BP Location: Left arm   Patient Position: Sitting   Cuff Size: Adult   Pulse: 86   Resp: 16   Temp: (!) 97 4 °F (36 3 °C)   TempSrc: Tympanic   SpO2: 98%   Weight: 60 1 kg (132 lb 9 6 oz)   Height: 4' 11 5" (1 511 m)      Physical Exam  Vitals signs reviewed  Constitutional:       Appearance: Normal appearance  Cardiovascular:      Rate and Rhythm: Normal rate and regular rhythm  Pulses: Normal pulses  Pulmonary:      Effort: Pulmonary effort is normal       Breath sounds: Normal breath sounds  Musculoskeletal:      Right knee: She exhibits normal range of motion, no swelling, no LCL laxity and no MCL laxity  No tenderness found  Neurological:      Mental Status: She is alert  Assessment/Plan:    No problem-specific Assessment & Plan notes found for this encounter  Diagnoses and all orders for this visit:    Acute pain of right knee  -     XR knee 3 vw right non injury; Future  -     naproxen (NAPROSYN) 500 mg tablet; Take 1 tablet (500 mg total) by mouth 2 (two) times a day with meals    Need for hepatitis C screening test  -     Hepatitis C Antibody ([facility]);  Future    Screening for HIV (human immunodeficiency virus)  -     HIV 1/2 Antigen/Antibody (4th Generation) w Reflex [facility]; Future

## 2021-04-13 ENCOUNTER — APPOINTMENT (OUTPATIENT)
Dept: RADIOLOGY | Facility: MEDICAL CENTER | Age: 56
End: 2021-04-13
Payer: COMMERCIAL

## 2021-04-13 DIAGNOSIS — M25.561 ACUTE PAIN OF RIGHT KNEE: ICD-10-CM

## 2021-04-13 PROCEDURE — 73562 X-RAY EXAM OF KNEE 3: CPT

## 2021-04-14 ENCOUNTER — TELEPHONE (OUTPATIENT)
Dept: FAMILY MEDICINE CLINIC | Facility: CLINIC | Age: 56
End: 2021-04-14

## 2021-04-14 LAB
HCV AB S/CO SERPL IA: 0.02
HCV AB SERPL QL IA: NORMAL
HIV 1+2 AB+HIV1 P24 AG SERPL QL IA: NORMAL

## 2021-04-14 NOTE — TELEPHONE ENCOUNTER
Left message for patient  to call the office, per [facility]   Please call patient with normal HIV and HEP C labs

## 2021-04-20 ENCOUNTER — TELEPHONE (OUTPATIENT)
Dept: FAMILY MEDICINE CLINIC | Facility: CLINIC | Age: 56
End: 2021-04-20

## 2021-04-20 DIAGNOSIS — M25.561 ACUTE PAIN OF RIGHT KNEE: Primary | ICD-10-CM

## 2021-04-20 NOTE — TELEPHONE ENCOUNTER
Left message for patient to call the office, per [facility]   Please call patient with knee x-ray normal

## 2021-04-21 NOTE — TELEPHONE ENCOUNTER
Patient called back  Told her that referral for PT was placed and she said that they already called her  She is going to check her insurance coverage and decide then

## 2021-05-10 ENCOUNTER — TRANSCRIBE ORDERS (OUTPATIENT)
Dept: ADMINISTRATIVE | Facility: HOSPITAL | Age: 56
End: 2021-05-10

## 2021-05-10 DIAGNOSIS — Z12.31 ENCOUNTER FOR SCREENING MAMMOGRAM FOR MALIGNANT NEOPLASM OF BREAST: Primary | ICD-10-CM

## 2021-05-12 ENCOUNTER — IMMUNIZATIONS (OUTPATIENT)
Dept: FAMILY MEDICINE CLINIC | Facility: HOSPITAL | Age: 56
End: 2021-05-12

## 2021-05-12 DIAGNOSIS — Z23 ENCOUNTER FOR IMMUNIZATION: Primary | ICD-10-CM

## 2021-05-12 PROCEDURE — 0012A SARS-COV-2 / COVID-19 MRNA VACCINE (MODERNA) 100 MCG: CPT

## 2021-05-12 PROCEDURE — 91301 SARS-COV-2 / COVID-19 MRNA VACCINE (MODERNA) 100 MCG: CPT

## 2021-05-14 DIAGNOSIS — N95.1 HOT FLASHES DUE TO MENOPAUSE: ICD-10-CM

## 2021-05-14 RX ORDER — ESTRADIOL/NORETHINDRONE ACETATE TRANSDERMAL SYSTEM .05; .14 MG/D; MG/D
PATCH, EXTENDED RELEASE TRANSDERMAL
Qty: 8 PATCH | Refills: 11 | Status: SHIPPED | OUTPATIENT
Start: 2021-05-14 | End: 2022-05-25 | Stop reason: SDUPTHER

## 2021-05-17 ENCOUNTER — EVALUATION (OUTPATIENT)
Dept: PHYSICAL THERAPY | Facility: REHABILITATION | Age: 56
End: 2021-05-17
Payer: COMMERCIAL

## 2021-05-17 DIAGNOSIS — R10.2 VAGINAL PAIN: ICD-10-CM

## 2021-05-17 DIAGNOSIS — M25.561 ACUTE PAIN OF RIGHT KNEE: ICD-10-CM

## 2021-05-17 DIAGNOSIS — M62.89 PELVIC FLOOR DYSFUNCTION: Primary | ICD-10-CM

## 2021-05-17 PROCEDURE — 97162 PT EVAL MOD COMPLEX 30 MIN: CPT | Performed by: PHYSICAL THERAPIST

## 2021-05-17 PROCEDURE — 97112 NEUROMUSCULAR REEDUCATION: CPT | Performed by: PHYSICAL THERAPIST

## 2021-05-17 PROCEDURE — 97140 MANUAL THERAPY 1/> REGIONS: CPT | Performed by: PHYSICAL THERAPIST

## 2021-05-17 NOTE — PROGRESS NOTES
PT Evaluation     Today's date: 2021  Patient name: Alicia Pollard  : 1965  MRN: [id number]  Referring provider: CAMILA Morgan  Dx:   Encounter Diagnosis     ICD-10-CM    1  Acute pain of right knee  M25 561 Ambulatory referral to Physical Therapy       Start Time: 1640  Stop Time: 1725  Total time in clinic (min): 45 minutes    Assessment  Assessment details: Patient is a 64 y o  female that presents with right medial knee pain  Patient presents with decreased strength and tenderness to palpation  Patient has difficulty with turning and exercising such as with her glider secondary to impairments  Patient would benefit from skilled physical therapy services to address impairments to maximize function  Symptoms consistent with possible medial mensicus tear  If no improvement in 4 weeks, will refer to ortho  Impairments: impaired physical strength, lacks appropriate home exercise program and pain with function  Understanding of Dx/Px/POC: good   Prognosis: good    Goals  Impairment:  1  Patient will reports 50% reduction in pain in 4 weeks to maximize function  2   Patient will improve strength to 5/5 in all planes to maximize function  Functional:  1  Patient will improve FOTO by 6 points to 84/100 in 4 weeks to maximize function  2  Patient will be independent with HEP in 4 weeks to maximize function  3  Patient will report no difficulty with turning in 4 weeks to maximize function  4  Patient will report no difficulty with exercising in 4 weeks to maximize function  Plan  Plan details: Patient will be a RE in 4 weeks      Patient would benefit from: skilled physical therapy  Planned modality interventions: low level laser therapy and cryotherapy  Planned therapy interventions: patient education, neuromuscular re-education, activity modification, abdominal trunk stabilization, home exercise program, therapeutic exercise, therapeutic activities, strengthening, balance and manual therapy  Frequency: 1x week  Duration in visits: 4  Duration in weeks: 4  Treatment plan discussed with: patient        Subjective Evaluation    History of Present Illness  Mechanism of injury: Patient presents with right knee pain for the last year  Patient is unsure what may have started her symptoms  She reports no prior history of knee pain  Patient describes her pain as the medial aspect of her knee  She will notice it with her glider exercising and feels like she needs to adjust her knee  Patient with also notice pain with twisting and turning and walking up hills  Patient is able to stand at work  No difficulty with ambulation, squatting, or negotiation of stairs  Pain  At worst pain ratin  Location: medial R knee  Quality: discomfort  Aggravating factors: walking (turning)  Progression: no change      Diagnostic Tests  X-ray: normal  Treatments  Current treatment: physical therapy  Patient Goals  Patient goals for therapy: decreased pain  Patient goal: exercise and walk without pain        Objective     Tenderness     Right Knee   Tenderness in the medial joint line  No tenderness in the fibular head, lateral joint line, patellar tendon, pes anserinus, popliteal fossa and quadriceps tendon       Passive Range of Motion   Left Hip   Normal passive range of motion    Right Hip   Normal passive range of motion  Left Knee   Normal passive range of motion    Right Knee   Normal passive range of motion  Prone flexion: WFL    Additional Passive Range of Motion Details  Hamstring length: WFL silvia    Mobility   Patellar Mobility:     Right Knee   WFL: medial, lateral, superior and inferior    Strength/Myotome Testing     Left Hip   Planes of Motion   Flexion: 5  Extension: 4+  Abduction: 4+  External rotation: 5  Internal rotation: 5    Right Hip   Planes of Motion   Flexion: 5  Extension: 4+  Abduction: 4+  External rotation: 5  Internal rotation: 5    Left Knee   Flexion: 4+  Extension: 4+  Quadriceps contraction: good    Right Knee   Flexion: 4+  Extension: 4+  Quadriceps contraction: good    Tests     Right Knee   Positive Thessaly's test at 20 degrees  Negative lateral Regis, medial Regis and Thessaly's test at 5 degrees  Ambulation     Ambulation: Level Surfaces   Ambulation without assistive device: independent    Observational Gait   Gait: within functional limits   Walking speed and stride length within functional limits       Functional Assessment        Single Leg Stance   Left: 30 seconds  Right: 30 seconds             Precautions: none      Manuals 5/17            IE 35 min            Laser medial knee                                       Neuro Re-Ed             SLS eyes closed 3x30" issued            SLS bosu             Walking foam             Sidestepping foam             Sidestepping band             Monster walks band             Lateral step up SL squat             Ther Ex             treadmill             SL press                                                                                           Ther Activity                                       Gait Training                                       Modalities             Ice PRN

## 2021-05-17 NOTE — PROGRESS NOTES
PT Evaluation     Today's date: 2021  Patient name: Pedro Richard  : 1965  MRN: [id number]  Referring provider: Mckayla Lunsford DO  Dx:   Encounter Diagnosis     ICD-10-CM    1  Pelvic floor dysfunction  M62 89    2  Vaginal pain  R10 2                   Assessment  Assessment details: Pedro Richard is a 64 y o  female who presents to physical therapy with Pelvic floor dysfunction and right sided vaginal pain  Internal assessment will be performed at [facility] due to time constraint during IE  Functional impairments include discomfort with working out, pain with lying supine at night, pain with speculum exam, and feeling "gassy"  Physical findings include scar tissue restriction in R lower abdominals, increased soft tissue tension over R suprapubic region, and delayed activation of TA during exercises  Pedro Richard would benefit from formal physical therapy to address impairments as detailed, decrease pain, and restore maximal level of function for all home, work, and mobility tasks  Thank you for this referral     Impairments: abnormal coordination, abnormal muscle firing, abnormal muscle tone, pain with function and poor body mechanics  Understanding of Dx/Px/POC: good   Prognosis: good  Prognosis details: Pt had good response with TA cuing, immediately able to reduce pain    Goals  Short term (to be met in 4 weeks):  1  Pt will return to [facility] for aerobic workout for 20 minutes with pain <2/10  2  Pt will demonstrate compliance with HEP  Long term (to be met by discharge):  1  Pt will return to full exercise routine with pain <2/10  2  Pt will tolerate speculum exam for routine cancer screenings with pain <2/10  3  Pt will lie supine without return of pain or feeling of gassiness      Plan  Patient would benefit from: skilled physical therapy and women's health eval  Planned modality interventions: biofeedback  Planned therapy interventions: abdominal trunk stabilization, activity modification, joint mobilization, manual therapy, massage, Tucker taping, neuromuscular re-education, patient education, postural training, strengthening, stretching, therapeutic activities, therapeutic exercise, behavior modification, body mechanics training, breathing training and home exercise program  Frequency: 1x week  Duration in visits: 12  Treatment plan discussed with: patient        PT Pelvic Floor Subjective:   History of Present Illness:   Pt was working out using glider with legs and arms  Pt had some pain in R lower pubic region  Pt was doing other exercises and wasn't sure what was starting it  Pt also uses stepper without issue  Pt has pain with being supine and flexing/extending hip  Pt feels vaginal wall spasm just on the right side  Pt states that this started a while ago  Pt is unsure is anything changed bowel/bladder, but states that she felt a bit gassy  Pt locates pain on the R side  Pt states that pain only lasts during exercises, but she can sometimes palpate it at night after sit-ups  Pt has been walking and notes low back pain after 2 laps of walking  Pt denies abdominal pain  Pt had uterine fibroids removed two times, laparoscopically and low transverse incision  Pt denies difficulty with healing  Pt also has knee pain on the R side; "it's been there forever " Pt states that Estrace cream helped relieve dryness  Pt has pain with exercise only, not with sitting    Social Support:     Lives in:  Multiple-level home (no issues with stairs)    Lives with:  Alone    Relationship status: never     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)    History of Depression: no  Diet and Exercise:    Diet:balanced nutrition    Exercise type: walking and strengthening exercise program  OB/ gyn History    Gestational History:     Prior Pregnancy: No      Menstrual History:      Menopausal: menopause  Menopause 3-4 years ago, using patch  Bladder Function:     Voiding Difficulties negative for: urgency, frequent urination, hesitancy, straining and incomplete emptying      Voiding Difficulties comments:     Voiding frequency: every 3-4 hours    Urinary leakage: no urine leakage    Nocturia (episodes per night): 0    Painful urination: No      Fluid Intake Type: Water and milk  Bowel Function:     Bowel frequency: daily    B[place] Stool Scale: type 5  Sexual Function:     Sexually Active:  Not sexually active  Pain:     At best pain ratin    At worst pain ratin    Location:  R vaginal wall    Quality: sore  Aggravating factors:  Exercise    Duration of symptoms:  Brief    Relieving factors:  Change in position and rest    Progression:  Worsening  Diagnostic Tests:     None    Treatments:   Upcoming doctor's appointment: yes  Date of next appointment: 2021  Patient Goals:     Patient goals for therapy:  Decreased pain and return to sport/leisure activities    Other patient goals:  Get rid of the pain, work out pain free      Objective     Static Posture     Comments  + Gillet's B, R trunk lean    Lumbar Screen  Lumbar range of motion within normal limits  Strength/Myotome Testing     Left Hip   Planes of Motion   Flexion: 5  Abduction: 5  External rotation: 5  Internal rotation: 5    Right Hip   Planes of Motion   Flexion: 5  Abduction: 4+  External rotation: 5  Internal rotation: 5    Tests     Left Hip   Positive Gillet's  Negative RODO and FADIR  SLR: Negative  Right Hip   Positive Gillet's  Negative RODO and FADIR  SLR: Negative       Additional Tests Details  Feels pain when pulling R leg into mild RODO, palpable tension along R pubic arch, less pain with TA contraction  Better situps with TA activation prior    Ambulation     Comments   R trunk lean, medial push off on R  Pelvic Floor Exam   Position: supine exam  Abdominal assessment: No TTP    Diastatis   Diastasis recti present? no  Connective tissue integrity at linea alba: firm  no tenderness at linea alba  able to engage transverse abdominis     Skin inspection:   scars present  Number of scars: 1  Additional skin inspection details: Worse in R most 1/3  Scar 1 location: low transverse   Sensitivity level low Restriction level medium     General Perineum Exam:     General perineum exam comments: Assess at NV               Precautions: hx fibroids      Manuals 5/17            Cupping to R low transverse scar ED            Internal assess nv                                      Neuro Re-Ed 5/17            TA iso 3"x5            TA + sit up series reviewed            TA + bridge + hip add 10x, 10x pulse            Diaphragmatic breath nv                                                   Ther Ex 5/17            Seated Tspine rotation w/ bar 3#, 5x ea            Rotating windmill lunge w/ TA 5x ea            TA + heel drops nv            TA + frogs w/ pball nv            TA + LTR on pball nv                                                   Ther Activity                                       Gait Training                                       Modalities

## 2021-05-18 ENCOUNTER — TELEPHONE (OUTPATIENT)
Dept: FAMILY MEDICINE CLINIC | Facility: CLINIC | Age: 56
End: 2021-05-18

## 2021-05-18 ENCOUNTER — OFFICE VISIT (OUTPATIENT)
Dept: FAMILY MEDICINE CLINIC | Facility: CLINIC | Age: 56
End: 2021-05-18
Payer: COMMERCIAL

## 2021-05-18 VITALS
SYSTOLIC BLOOD PRESSURE: 140 MMHG | DIASTOLIC BLOOD PRESSURE: 80 MMHG | WEIGHT: 130 LBS | BODY MASS INDEX: 25.82 KG/M2 | OXYGEN SATURATION: 98 % | HEART RATE: 85 BPM | TEMPERATURE: 97.6 F | RESPIRATION RATE: 18 BRPM

## 2021-05-18 DIAGNOSIS — B35.1 FUNGAL NAIL INFECTION: ICD-10-CM

## 2021-05-18 DIAGNOSIS — L91.8 SKIN TAG: Primary | ICD-10-CM

## 2021-05-18 PROCEDURE — 99213 OFFICE O/P EST LOW 20 MIN: CPT | Performed by: FAMILY MEDICINE

## 2021-05-18 PROCEDURE — 3725F SCREEN DEPRESSION PERFORMED: CPT | Performed by: FAMILY MEDICINE

## 2021-05-18 PROCEDURE — 17110 DESTRUCTION B9 LES UP TO 14: CPT | Performed by: FAMILY MEDICINE

## 2021-05-18 NOTE — TELEPHONE ENCOUNTER
Called [facility] regarding patient's thinking she may need auth for skin tag removal  No Manpreet Eng is required for this per [facility]

## 2021-05-24 ENCOUNTER — APPOINTMENT (OUTPATIENT)
Dept: PHYSICAL THERAPY | Facility: REHABILITATION | Age: 56
End: 2021-05-24
Payer: COMMERCIAL

## 2021-05-25 ENCOUNTER — ANNUAL EXAM (OUTPATIENT)
Dept: GYNECOLOGY | Facility: CLINIC | Age: 56
End: 2021-05-25
Payer: COMMERCIAL

## 2021-05-25 VITALS
DIASTOLIC BLOOD PRESSURE: 68 MMHG | SYSTOLIC BLOOD PRESSURE: 110 MMHG | WEIGHT: 131 LBS | HEART RATE: 98 BPM | HEIGHT: 60 IN | BODY MASS INDEX: 25.72 KG/M2

## 2021-05-25 DIAGNOSIS — D21.9 FIBROIDS: ICD-10-CM

## 2021-05-25 DIAGNOSIS — N95.1 HOT FLASHES DUE TO MENOPAUSE: ICD-10-CM

## 2021-05-25 DIAGNOSIS — Z79.890 HORMONE REPLACEMENT THERAPY (HRT): ICD-10-CM

## 2021-05-25 DIAGNOSIS — Z01.419 PAPANICOLAOU TEST, AS PART OF ROUTINE GYNECOLOGICAL EXAMINATION: Primary | ICD-10-CM

## 2021-05-25 PROCEDURE — 1036F TOBACCO NON-USER: CPT | Performed by: OBSTETRICS & GYNECOLOGY

## 2021-05-25 PROCEDURE — 99396 PREV VISIT EST AGE 40-64: CPT | Performed by: OBSTETRICS & GYNECOLOGY

## 2021-05-25 PROCEDURE — 3008F BODY MASS INDEX DOCD: CPT | Performed by: OBSTETRICS & GYNECOLOGY

## 2021-05-25 NOTE — PROGRESS NOTES
Assessment/Plan:         Diagnoses and all orders for this visit:    Papanicolaou test, as part of routine gynecological examination  -     PapIG, rfxHPVall, 16/18    Fibroids; stable and asymptomatic-follow    Hormone replacement therapy (HRT)  -     estradiol-norethindrone (COMBIPATCH) 0 05-0 14 MG/DAY; Place 1 patch on the skin 2 (two) times a week    Hot flashes due to menopause        Subjective:      Patient ID: Karin Segovia is a 64 y o  female  HPI patient presents for yearly examination  She is presently on hormone replacement therapy she has been on this for 1 year and is doing well on this  She denies any vaginal irritation, burning, discharge or bleeding  Denies any dysuria, hematuria urgency or stress urinary incontinence  No GI complaints  Colonoscopy at age 48 normal repeated age 61     Osteoporosis screening via heel scan May of 2020: -0 8    The following portions of the patient's history were reviewed and updated as appropriate:   She  has a past medical history of Fibroids (1994)  She   Patient Active Problem List    Diagnosis Date Noted    Skin tag 05/18/2021    Fungal nail infection 05/18/2021    Difficulty swallowing 11/07/2017    Dysphagia 11/07/2017    Allergic rhinitis 10/12/2017    Uterine leiomyoma 03/22/2016    Backache 01/29/2016    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    Gastroesophageal reflux disease 09/18/2015    Epidermoid cyst of skin 06/03/2015    Anisocoria 11/08/2011     She  has a past surgical history that includes Myomectomy; [place] tooth extraction; Esophagogastroduodenoscopy; and Other surgical history  Her family history includes Heart attack in her family;  Hypertension in her mother; Kidney failure in her maternal uncle; Lung cancer (age of onset: 72) in her father; Lymphoma in her maternal grandmother; No Known Problems in her maternal aunt, maternal aunt, maternal aunt, maternal aunt, paternal aunt, paternal aunt, paternal aunt, and sister; Stroke in her family  She  reports that she has never smoked  She has never used smokeless tobacco  She reports current alcohol use  She reports that she does not use drugs  Current Outpatient Medications   Medication Sig Dispense Refill    Ascorbic Acid (RALPH-C PO) Take by mouth      cholecalciferol (VITAMIN D3) 1,000 units tablet Take 1,000 Units by mouth daily       CombiPatch 0 05-0 14 MG/DAY PLACE 1 PATCH ON THE SKIN 2 (TWO) TIMES A WEEK 8 patch 11    estradiol (ESTRACE) 0 1 mg/g vaginal cream 1 gram twice week x 4 weeks then once weekly 42 5 g 3    estradiol (ESTRACE) 0 1 mg/g vaginal cream Insert 1 g into the vagina once a week 42 5 g 3    [START ON 5/27/2021] estradiol-norethindrone (COMBIPATCH) 0 05-0 14 MG/DAY Place 1 patch on the skin 2 (two) times a week 24 patch 3    hydrocortisone 2 5 % cream Apply topically 3 (three) times a day 15 g 2    KRILL OIL PO Take by mouth      loratadine-pseudoephedrine (CLARITIN-D 24-HOUR)  mg per 24 hr tablet Take 1 tablet by mouth daily      Multiple Vitamins-Minerals (MULTIVITAMIN ADULT PO) every 24 hours      naproxen (NAPROSYN) 500 mg tablet Take 1 tablet (500 mg total) by mouth 2 (two) times a day with meals (Patient not taking: Reported on 5/18/2021) 30 tablet 0    omeprazole (PriLOSEC) 40 MG capsule Take 1 capsule (40 mg total) by mouth daily before breakfast (Patient not taking: Reported on 4/12/2021) 30 capsule 12    valACYclovir (VALTREX) 1,000 mg tablet Take 1 tablet (1,000 mg total) by mouth 2 (two) times a day 30 tablet 1     No current facility-administered medications for this visit        Current Outpatient Medications on File Prior to Visit   Medication Sig    Ascorbic Acid (RALPH-C PO) Take by mouth    cholecalciferol (VITAMIN D3) 1,000 units tablet Take 1,000 Units by mouth daily     CombiPatch 0 05-0 14 MG/DAY PLACE 1 PATCH ON THE SKIN 2 (TWO) TIMES A WEEK    estradiol (ESTRACE) 0 1 mg/g vaginal cream 1 gram twice week x 4 weeks then once weekly    estradiol (ESTRACE) 0 1 mg/g vaginal cream Insert 1 g into the vagina once a week    hydrocortisone 2 5 % cream Apply topically 3 (three) times a day    KRILL OIL PO Take by mouth    loratadine-pseudoephedrine (CLARITIN-D 24-HOUR)  mg per 24 hr tablet Take 1 tablet by mouth daily    Multiple Vitamins-Minerals (MULTIVITAMIN ADULT PO) every 24 hours    naproxen (NAPROSYN) 500 mg tablet Take 1 tablet (500 mg total) by mouth 2 (two) times a day with meals (Patient not taking: Reported on 5/18/2021)    omeprazole (PriLOSEC) 40 MG capsule Take 1 capsule (40 mg total) by mouth daily before breakfast (Patient not taking: Reported on 4/12/2021)    valACYclovir (VALTREX) 1,000 mg tablet Take 1 tablet (1,000 mg total) by mouth 2 (two) times a day     No current facility-administered medications on file prior to visit  She is allergic to cefuroxime; cetirizine; fexofenadine; penicillins; sulfa antibiotics; and sulfanilamide       Review of Systems   Constitutional: Negative  HENT: Negative for sore throat and trouble swallowing  Gastrointestinal: Negative  Genitourinary: Negative  Objective:      /68 (BP Location: Left arm, Patient Position: Sitting, Cuff Size: Standard)   Pulse 98   Ht 4' 11 8" (1 519 m)   Wt 59 4 kg (131 lb)   LMP 07/03/2017   BMI 25 76 kg/m²          Physical Exam  Vitals signs reviewed  Constitutional:       Appearance: Normal appearance  She is normal weight  Neck:      Musculoskeletal: Normal range of motion and neck supple  No muscular tenderness  Cardiovascular:      Rate and Rhythm: Normal rate and regular rhythm  Pulses: Normal pulses  Heart sounds: Normal heart sounds  No murmur  Pulmonary:      Effort: Pulmonary effort is normal  No respiratory distress  Breath sounds: Normal breath sounds     Chest:      Breasts:         Right: No swelling, bleeding, inverted nipple, mass, nipple discharge, skin change or tenderness  Left: No swelling, bleeding, inverted nipple, mass, nipple discharge, skin change or tenderness  Abdominal:      General: There is no distension  Palpations: Abdomen is soft  There is no mass  Tenderness: There is no abdominal tenderness  There is no guarding or rebound  Hernia: No hernia is present  There is no hernia in the left inguinal area or right inguinal area  Genitourinary:     General: Normal vulva  Labia:         Right: No rash, tenderness or lesion  Left: No rash, tenderness or lesion  Vagina: Normal       Cervix: Normal       Uterus: Enlarged (Stable consistent with 12 week fibroid uterus)  Not deviated, not fixed, not tender and no uterine prolapse  Adnexa:         Right: No mass, tenderness or fullness  Left: No mass, tenderness or fullness  Lymphadenopathy:      Cervical: No cervical adenopathy  Upper Body:      Right upper body: No supraclavicular, axillary or pectoral adenopathy  Left upper body: No supraclavicular, axillary or pectoral adenopathy  Lower Body: No right inguinal adenopathy  No left inguinal adenopathy  Neurological:      Mental Status: She is alert

## 2021-05-27 LAB
CYTOLOGIST CVX/VAG CYTO: NORMAL
DX ICD CODE: NORMAL
OTHER STN SPEC: NORMAL
OTHER STN SPEC: NORMAL
PATH REPORT.FINAL DX SPEC: NORMAL
SL AMB NOTE:: NORMAL
SL AMB SPECIMEN ADEQUACY: NORMAL
SL AMB TEST METHODOLOGY: NORMAL

## 2021-05-28 ENCOUNTER — TELEPHONE (OUTPATIENT)
Dept: FAMILY MEDICINE CLINIC | Facility: CLINIC | Age: 56
End: 2021-05-28

## 2021-05-28 NOTE — TELEPHONE ENCOUNTER
Patient called back and states the skin tag hasn't fallen off yet and she said you told her to call if it isn't off in 2 weeks  I scheduled her with you on 7/2/21 because there are no sooner openings  She asked me to send you a note so you could get her in sooner  She is aware you are out of the office until 6/7/21

## 2021-06-01 ENCOUNTER — OFFICE VISIT (OUTPATIENT)
Dept: PHYSICAL THERAPY | Facility: REHABILITATION | Age: 56
End: 2021-06-01
Payer: COMMERCIAL

## 2021-06-01 DIAGNOSIS — M25.561 ACUTE PAIN OF RIGHT KNEE: Primary | ICD-10-CM

## 2021-06-01 PROCEDURE — 97110 THERAPEUTIC EXERCISES: CPT

## 2021-06-01 PROCEDURE — 97140 MANUAL THERAPY 1/> REGIONS: CPT

## 2021-06-01 PROCEDURE — 97112 NEUROMUSCULAR REEDUCATION: CPT

## 2021-06-01 NOTE — PROGRESS NOTES
Daily Note     Today's date: 2021  Patient name: Parag Trejo  : 1965  MRN: [id number]  Referring provider: CAMILA Leo  Dx:   Encounter Diagnosis     ICD-10-CM    1  Acute pain of right knee  M25 561        Start Time: 1445  Stop Time: 1530  Total time in clinic (min): 45 minutes    Subjective: Pt reports having a lot of ankle soreness after last treatment which she attributes to SLS with EC  Pt reports knee pain has been minimal and denies sx's arriving to PT this visit, reports my other problem is more bothersome  Objective: See treatment diary below    Precautions: none      Manuals            IE 35 min            Laser medial knee  8 min                                     Neuro Re-Ed             SLS eyes closed 3x30" issued 30"x2 ea           SLS bosu  np           Walking foam  np           Sidestepping foam  np           Sidestepping band  Red x2 ea           Monster walks band  Red x2 ea           Lateral step up SL squat  np           Ther Ex             treadmill  2 5 mph  5 min           SL press (seat 3 plate 4)  10# N60 ea                                                                                         Ther Activity                                       Gait Training                                       Modalities             Ice PRN                            Assessment: Pt presents to PT for first visit since initial evaluation  Initiated TE as noted per PT POC  Tolerated treatment well  Pt fatigues with activities performed, demonstrates gluteal weakness with hip sag on stance leg during SLS and trunk rotation during monster walks  Refrained from adding stability exercises on foam and/or bosu due to ankle sx's after last treatment  Pt was issued band for home, verbalizes understanding of use  Trialed laser to R knee at end of session with good tolerance, assess response to treatment NV   Patient demonstrated fatigue post treatment and would benefit from continued PT to improve strength and maximize overall level of function  Plan: Progress treatment as tolerated

## 2021-06-03 ENCOUNTER — OFFICE VISIT (OUTPATIENT)
Dept: PHYSICAL THERAPY | Facility: REHABILITATION | Age: 56
End: 2021-06-03
Payer: COMMERCIAL

## 2021-06-03 DIAGNOSIS — R10.2 VAGINAL PAIN: ICD-10-CM

## 2021-06-03 DIAGNOSIS — M62.89 PELVIC FLOOR DYSFUNCTION: Primary | ICD-10-CM

## 2021-06-03 PROCEDURE — 97110 THERAPEUTIC EXERCISES: CPT | Performed by: PHYSICAL THERAPIST

## 2021-06-03 PROCEDURE — 97530 THERAPEUTIC ACTIVITIES: CPT | Performed by: PHYSICAL THERAPIST

## 2021-06-03 PROCEDURE — 97140 MANUAL THERAPY 1/> REGIONS: CPT | Performed by: PHYSICAL THERAPIST

## 2021-06-03 NOTE — PROGRESS NOTES
Daily Note     Today's date: 6/3/2021  Patient name: Parag Trejo  : 1965  MRN: [id number]  Referring provider: Juliet Walters DO  Dx:   Encounter Diagnosis     ICD-10-CM    1  Pelvic floor dysfunction  M62 89    2  Vaginal pain  R10 2                   Subjective: Pt states that some days she feels fine and some days she has increased pain  "I need to get rid of this  It's really bothering me " Pt notes that pain sometimes comes on when she's lying supine in bed  Objective: See treatment diary below  Pt provided verbal consent prior to initiation of internal manual assessment  Skin, sensation, and reflexes all intact  No TTP throughout pelvic floor  Pt is able to contract, relax, and bulge with good ROM and timing  Strength 4/5 bilaterally  No reproduction of R groin pain, no spasm or guarding      Assessment: Tolerated treatment well  Patient demonstrated fatigue post treatment, exhibited good technique with therapeutic exercises and would benefit from continued PT to decrease R groin pain  Pt had good tolerance to exercises with introduction of TA with exhale and with starting with fully supported spine and flexed hips  Pt was able to reproduce pain with upper abdominal curl and with lifting leg from a supine position  Discussed modifying exercises to avoid upper abdominal curl for now and to slow exercises with purposeful TA contraction and breathing  Pt had good response to TPR to R psoas starting at hip and following along groin line  Pt was able to SLR from supine without pain afterward  Pt was given updated HEP and verbalized understanding  Plan: Continue per plan of care  Progress treatment as tolerated         Precautions: hx fibroids      Manuals 5/17 6/3           Cupping to R low transverse scar ED nv           Internal assess nv ED           Gr 1 PA mobs to lumbar spine  ED           TPR to R psoas, then MW  ED           Neuro Re-Ed 5/17 6/3           TA iso 3"x5 3"x5 TA + sit up series reviewed hold           TA + bridge + hip add 10x, 10x pulse np           Diaphragmatic breath nv nv                                                  Ther Ex 5/17 6/3           Seated Tspine rotation w/ bar 3#, 5x ea nv           Rotating windmill lunge w/ TA 5x ea nv           TA + heel drops nv nv           TA + frogs w/ pball nv 10x           TA + LTR on pball nv 10x ea           TA + sheet brace  3"x10           TA + march  10x ea                        Ther Activity  6/3           Supine to sit  8 min                        Gait Training                                       Modalities

## 2021-06-07 ENCOUNTER — OFFICE VISIT (OUTPATIENT)
Dept: PHYSICAL THERAPY | Facility: REHABILITATION | Age: 56
End: 2021-06-07
Payer: COMMERCIAL

## 2021-06-07 DIAGNOSIS — M25.561 ACUTE PAIN OF RIGHT KNEE: Primary | ICD-10-CM

## 2021-06-07 PROCEDURE — 97112 NEUROMUSCULAR REEDUCATION: CPT | Performed by: PHYSICAL THERAPIST

## 2021-06-07 PROCEDURE — 97110 THERAPEUTIC EXERCISES: CPT | Performed by: PHYSICAL THERAPIST

## 2021-06-07 PROCEDURE — 97140 MANUAL THERAPY 1/> REGIONS: CPT | Performed by: PHYSICAL THERAPIST

## 2021-06-07 NOTE — PROGRESS NOTES
Daily Note     Today's date: 2021  Patient name: Aric Haley  : 1965  MRN: [id number]  Referring provider: CAMILA Rangel  Dx:   Encounter Diagnosis     ICD-10-CM    1  Acute pain of right knee  M25 561        Start Time: 1530  Stop Time: 161  Total time in clinic (min): 45 minutes    Subjective: Patient reports less ankle soreness since last visit  She denies any soreness following last treatment session  She has been using her glider at home which is going okay for her  Objective: See treatment diary below    Precautions: none      Manuals           IE 35 min            Laser medial knee  8 min 8 min                                    Neuro Re-Ed             SLS eyes closed 3x30" issued 30"x2 ea HEP          SLS bosu (flat and bubble)  np 2x30" each          Walking foam/backward  np 4 laps          Sidestepping foam  np 4 laps          Sidestepping band  Red x2 ea Red 3 laps          Monster walks band  Red x2 ea Red 3 laps          Lateral step up SL squat  np 0R 15x silvia          Ther Ex             treadmill  2 5 mph  5 min 2 5 mph 5 min          SL press (seat 3 plate 4)  79# S32 ea 55 lbs 20x silvia                                                                                        Ther Activity                                       Gait Training                                       Modalities             Ice PRN                            Assessment: Tolerated treatment well  Good tolerance to all therex this visit without pain  More challenged by balance exercises on left compared to right  Patient demonstrated fatigue post treatment and would benefit from continued PT to improve strength and maximize overall level of function  Plan: Progress treatment as tolerated

## 2021-06-09 ENCOUNTER — OFFICE VISIT (OUTPATIENT)
Dept: PHYSICAL THERAPY | Facility: REHABILITATION | Age: 56
End: 2021-06-09
Payer: COMMERCIAL

## 2021-06-09 DIAGNOSIS — B00.9 HERPES: ICD-10-CM

## 2021-06-09 DIAGNOSIS — M62.89 PELVIC FLOOR DYSFUNCTION: Primary | ICD-10-CM

## 2021-06-09 DIAGNOSIS — R10.2 VAGINAL PAIN: ICD-10-CM

## 2021-06-09 PROCEDURE — 97112 NEUROMUSCULAR REEDUCATION: CPT | Performed by: PHYSICAL THERAPIST

## 2021-06-09 PROCEDURE — 97110 THERAPEUTIC EXERCISES: CPT | Performed by: PHYSICAL THERAPIST

## 2021-06-09 RX ORDER — VALACYCLOVIR HYDROCHLORIDE 1 G/1
1000 TABLET, FILM COATED ORAL 2 TIMES DAILY
Qty: 30 TABLET | Refills: 6 | Status: SHIPPED | OUTPATIENT
Start: 2021-06-09 | End: 2022-04-21

## 2021-06-09 NOTE — PROGRESS NOTES
Daily Note     Today's date: 2021  Patient name: Shruti Best  : 1965  MRN: [id number]  Referring provider: Nata Robledo DO  Dx:   Encounter Diagnosis     ICD-10-CM    1  Pelvic floor dysfunction  M62 89    2  Vaginal pain  R10 2                   Subjective: Pt has been doing exercise with the ball with some relief  Pt continues to have pain with lying down intermittently  Pt had mild back pain following last visit  Pt did not have much relief through psoas  Pt reports feeling a little better  Pt was able to go on her glider yesterday with slight knee flexion and minimal pain  Objective: See treatment diary below    Assessment: Tolerated treatment well  Patient demonstrated fatigue post treatment, exhibited good technique with therapeutic exercises and would benefit from continued PT to decrease R groin pain  Pt has good tolerance to initiation of reformer exercises to work TA in plane of gravity  Pt has only 3/10 pain with exercises, 0/10 at rest  Pt verbalizes understanding of modifications to make when she performs exercises at home  Pt had good challenge to adductors  Plan: Continue per plan of care  Progress treatment as tolerated  Consider sports hernia       Precautions: hx fibroids      Manuals 5/17 6/3 6/9          Cupping to R low transverse scar ED nv nv          Internal assess nv ED np          Gr 1 PA mobs to lumbar spine  ED np          TPR to R psoas, then MWM  ED np          Neuro Re-Ed 5/17 6/3 6/9          TA iso 3"x5 3"x5 nv          TA + sit up series reviewed hold ---          TA + bridge + hip add 10x, 10x pulse np np          Diaphragmatic breath nv nv nv          Reformer squats w/ TA   All, 3x10          Bridges w/ circles   RY, 10x, 5x ea          TA + TB push/pulls   Grn, 10x ea          Ther Ex 5/17 6/3 6/9          Seated Tspine rotation w/ bar 3#, 5x ea nv nv          Rotating windmill lunge w/ TA 5x ea nv nv          TA + heel drops nv nv nv          TA + frogs w/ pball nv 10x nv          TA + LTR on pball nv 10x ea nv          TA + sheet brace  3"x10 nv          TA + march  10x ea nv          SL squat with hip abd on slider   10x ea          Steamboats, all dir   Grn, 10x ea          Ther Activity  6/3 6/9          Supine to sit  8 min np                       Gait Training                                       Modalities

## 2021-06-14 ENCOUNTER — HOSPITAL ENCOUNTER (OUTPATIENT)
Dept: MAMMOGRAPHY | Facility: MEDICAL CENTER | Age: 56
Discharge: HOME | End: 2021-06-14
Payer: COMMERCIAL

## 2021-06-14 VITALS — HEIGHT: 60 IN | WEIGHT: 131 LBS | BODY MASS INDEX: 25.72 KG/M2

## 2021-06-14 DIAGNOSIS — Z12.31 ENCOUNTER FOR SCREENING MAMMOGRAM FOR MALIGNANT NEOPLASM OF BREAST: ICD-10-CM

## 2021-06-14 PROCEDURE — 77063 BREAST TOMOSYNTHESIS BI: CPT

## 2021-06-14 PROCEDURE — 77067 SCR MAMMO BI INCL CAD: CPT

## 2021-06-15 ENCOUNTER — OFFICE VISIT (OUTPATIENT)
Dept: PHYSICAL THERAPY | Facility: REHABILITATION | Age: 56
End: 2021-06-15
Payer: COMMERCIAL

## 2021-06-15 DIAGNOSIS — M25.561 ACUTE PAIN OF RIGHT KNEE: Primary | ICD-10-CM

## 2021-06-15 PROCEDURE — 97140 MANUAL THERAPY 1/> REGIONS: CPT | Performed by: PHYSICAL THERAPIST

## 2021-06-15 PROCEDURE — 97110 THERAPEUTIC EXERCISES: CPT

## 2021-06-15 PROCEDURE — 97112 NEUROMUSCULAR REEDUCATION: CPT

## 2021-06-15 NOTE — PROGRESS NOTES
Daily Note     Today's date: 6/15/2021  Patient name: Pati Avelar  : 1965  MRN: [id number]  Referring provider: CAMILA Cristobal  Dx:   Encounter Diagnosis     ICD-10-CM    1  Acute pain of right knee  M25 561        Start Time: 1530  Stop Time: 161  Total time in clinic (min): 45 minutes    Subjective: Patient reports one day of knee pain since last visit describing as twinges here and there, otherwise denies R knee pain  Pt reports 95% improvement with skilled physical therapy services  Objective: See treatment diary below    Precautions: none      Manuals 5/17 6/1 6/7 6/15         IE 35 min            Laser medial knee  8 min 8 min 8 min                                   Neuro Re-Ed             SLS eyes closed 3x30" issued 30"x2 ea HEP HEP         SLS bosu (flat and bubble)  np 2x30" each 30"x2 ea         Walking foam/backward  np 4 laps x5 ea         Sidestepping foam  np 4 laps x5 ea         Sidestepping band  Red x2 ea Red 3 laps Green x2 ea         Monster walks band  Red x2 ea Red 3 laps Green x2 ea         Lateral step up SL squat  np 0R 15x silvia 0R x15 ea         Ther Ex             treadmill  2 5 mph  5 min 2 5 mph 5 min 2 5 mph 5 min         SL press (seat 3 plate 4)  36# B95 ea 55 lbs 20x silvia 65# x20 ea                                                                                       Ther Activity                                       Gait Training                                       Modalities             Ice PRN                            Assessment: Tolerated treatment well  Progressed resisted gluteal strengthening to green band with increased fatigue, was provided band for home use  Pt is more challenged with SL squat on L compared to R  Positive response to laser treatment  Patient demonstrated fatigue post treatment  Pt reports 95% improvement in skilled physical therapy services and improves FOTO score by 13 points from 78/100 to 91/100          Plan: Pt will be placed on hold at this time with probable transition to HEP  Pt was treated via unsupervised time from 3:43 - 3:45 pm, was supervised 1:1 by Deangelo Nixon DPT from 3:45 - 4:00 pm and was 1:1 with treating clinician for rest of session

## 2021-06-16 ENCOUNTER — OFFICE VISIT (OUTPATIENT)
Dept: PHYSICAL THERAPY | Facility: REHABILITATION | Age: 56
End: 2021-06-16
Payer: COMMERCIAL

## 2021-06-16 DIAGNOSIS — M62.89 PELVIC FLOOR DYSFUNCTION: Primary | ICD-10-CM

## 2021-06-16 DIAGNOSIS — R10.2 VAGINAL PAIN: ICD-10-CM

## 2021-06-16 PROCEDURE — 97112 NEUROMUSCULAR REEDUCATION: CPT | Performed by: PHYSICAL THERAPIST

## 2021-06-16 PROCEDURE — 97110 THERAPEUTIC EXERCISES: CPT | Performed by: PHYSICAL THERAPIST

## 2021-06-16 NOTE — PROGRESS NOTES
Daily Note     Today's date: 2021  Patient name: Pati Avelar  : 1965  MRN: [id number]  Referring provider: Darryl Holloway DO  Dx:   Encounter Diagnosis     ICD-10-CM    1  Pelvic floor dysfunction  M62 89    2  Vaginal pain  R10 2                   Subjective: Pt states that she has been doing ok  Pt got a theraband  Pt didn't feel pain in the bed after performing situps with band around her feet  Pt continues to have pain when she is on her glider but less intense than before  Pt denies back pain after LV  Pt states that her knee is "going better " Pt hasn't had pain with lying in bed over the past few nights  Objective: See treatment diary below    Assessment: Tolerated treatment well  Patient demonstrated fatigue post treatment, exhibited good technique with therapeutic exercises and would benefit from continued PT to decrease R groin pain  Pt feels R LQ pain with bilateral windmill touches  Pain resolves before the end of session  Pt has good tolerance to new exercises and notes no LQ pain following series of ab/adduction with pink TB, isometric abduction with gait belt, and isometric adduction with solid ball  Pt will try this progression at home to reduce abdominal pain  Plan: Continue per plan of care  Progress treatment as tolerated  Consider sports hernia       Precautions: hx fibroids      Manuals 5/17 6/3 6/9 6/17         Cupping to R low transverse scar ED nv nv nv         Internal assess nv ED np np         Gr 1 PA mobs to lumbar spine  ED np np         TPR to R psoas, then MWM  ED np np         Neuro Re-Ed 5/17 6/3 6/9 6/17         TA iso 3"x5 3"x5 nv nv         TA + sit up series reviewed hold --- ---         TA + bridge + hip add 10x, 10x pulse np np np         Diaphragmatic breath nv nv nv nv         Reformer squats w/ TA   All, 3x10 All, 3x10         Bridges w/ circles   RY, 10x, 5x ea RY, 10x, 10x ea         TA + TB push/pulls   Grn, 10x ea Grn, 15x ea         Ther Ex 5/17 6/3 6/9 6/16         Seated Tspine rotation w/ bar 3#, 5x ea nv nv nv         Rotating windmill lunge w/ TA 5x ea nv nv 10x ea         TA + heel drops nv nv nv 2x10 ea, alt         TA + frogs w/ pball nv 10x nv nv         TA + LTR on pball nv 10x ea nv nv         TA + sheet brace  3"x10 nv nv         TA + march  10x ea nv 2x10 ea alt         SL squat with hip abd on slider   10x ea 15x ea         Steamboats, all dir   Grn, 10x ea nv         [place] PNF    Y, 10x ea         iso abd w/ gait belt at feet    3"x10         iso add w/ solid ball at feet    3"x10         Ther Activity  6/3 6/9 6/16         Supine to sit  8 min np np                      Gait Training                                       Modalities

## 2021-06-23 ENCOUNTER — APPOINTMENT (OUTPATIENT)
Dept: PHYSICAL THERAPY | Facility: REHABILITATION | Age: 56
End: 2021-06-23
Payer: COMMERCIAL

## 2021-06-30 ENCOUNTER — OFFICE VISIT (OUTPATIENT)
Dept: PHYSICAL THERAPY | Facility: REHABILITATION | Age: 56
End: 2021-06-30
Payer: COMMERCIAL

## 2021-06-30 DIAGNOSIS — R10.2 VAGINAL PAIN: ICD-10-CM

## 2021-06-30 DIAGNOSIS — M62.89 PELVIC FLOOR DYSFUNCTION: Primary | ICD-10-CM

## 2021-06-30 PROCEDURE — 97112 NEUROMUSCULAR REEDUCATION: CPT | Performed by: PHYSICAL THERAPIST

## 2021-06-30 PROCEDURE — 97110 THERAPEUTIC EXERCISES: CPT | Performed by: PHYSICAL THERAPIST

## 2021-07-15 ENCOUNTER — OFFICE VISIT (OUTPATIENT)
Dept: PHYSICAL THERAPY | Facility: REHABILITATION | Age: 56
End: 2021-07-15
Payer: COMMERCIAL

## 2021-07-15 DIAGNOSIS — M62.89 PELVIC FLOOR DYSFUNCTION: Primary | ICD-10-CM

## 2021-07-15 DIAGNOSIS — R10.2 VAGINAL PAIN: ICD-10-CM

## 2021-07-15 PROCEDURE — 97112 NEUROMUSCULAR REEDUCATION: CPT | Performed by: PHYSICAL THERAPIST

## 2021-07-15 PROCEDURE — 97530 THERAPEUTIC ACTIVITIES: CPT | Performed by: PHYSICAL THERAPIST

## 2021-07-15 PROCEDURE — 97110 THERAPEUTIC EXERCISES: CPT | Performed by: PHYSICAL THERAPIST

## 2021-07-15 NOTE — PROGRESS NOTES
Daily Note     Today's date: 7/15/2021  Patient name: Jackie Royal  : 1965  MRN: [id number]  Referring provider: John Stephen DO  Dx:   Encounter Diagnosis     ICD-10-CM    1  Pelvic floor dysfunction  M62 89    2  Vaginal pain  R10 2                   Subjective: Pt felt that she may have been overdoing it with bands on R side but is feeling better this week  Pt does stretch with pball prior to exercising on glider  Pt does not notice difference in days that she does or doesn't work out with R sided abdominal pain  Pain is not waking pt at night but pt feels sore in the morning  Pt denies any adverse reactions after LV  Objective: See treatment diary below    Assessment: Tolerated treatment well  Patient demonstrated fatigue post treatment, exhibited good technique with therapeutic exercises and would benefit from continued PT to decrease R groin pain  Pt had greater soreness with pulling R leg into adduction  Pt had good tolerance to dynamic stability without increased pain  Pt is challenged with lowering legs below 45 degrees noting start of pain  Provided pt with illustration of adductor group to explain attachment onto pubic bone and proximity to abdominal attachments  Pt will try ice massage prior to bed to decrease inflammation  Plan: Continue per plan of care  Progress treatment as tolerated  RE at [address]       Precautions: hx fibroids      Manuals 5/17 6/3 6/9 6/16 6/30 7/15       Cupping to R low transverse scar ED nv nv nv np np       Internal assess nv ED np np np np       Gr 1 PA mobs to lumbar spine  ED np np np np       TPR to R psoas, then MWM  ED np np np np       Neuro Re-Ed 5/17 6/3 6/9 6/16 6/30 715       TA iso 3"x5 3"x5 nv nv np np       TA + sit up series reviewed hold --- --- --- ---       TA + bridge + hip add 10x, 10x pulse np np np np np       Diaphragmatic breath nv nv nv nv np np       Reformer squats w/ TA   All, 3x10 All, 3x10 np nv       Bridges w/ circles RY, 10x, 5x ea RY, 10x, 10x ea np nv       TA + TB push/pulls   Grn, 10x ea Grn, 15x ea Blue TB, 15x ea nv       SLS w/ ABC      4# ball, 1 set ea       Pilates walking      To 45*, org, 10x       Pilates frogs      To 45*, org, 20x       Ther Ex 5/17 6/3 6/9 6/16 6/30 7/15       Seated Tspine rotation w/ bar 3#, 5x ea nv nv nv 5x ea w and w/o bar np       Rotating windmill lunge w/ TA 5x ea nv nv 10x ea 5x ea, 5x ea 50% ROM np       TA + heel drops nv nv nv 2x10 ea, alt 10x ea, alt np       TA + frogs w/ pball nv 10x nv nv 15x np       TA + LTR on pball nv 10x ea nv nv 15x ea np       TA + sheet brace  3"x10 nv nv nv np       TA + march  10x ea nv 2x10 ea alt nv np       SL squat with hip abd on slider   10x ea 15x ea 5x ea, 5x ea 50% ROM 10x ea, flex, abd B       Steamboats, all dir   Grn, 10x ea nv Grn, 15x ea np       [place] PNF    Y, 10x ea Y TB, 15x ea np       iso abd w/ gait belt at feet    3"x10 nv np       iso add w/ solid ball at feet    3"x10 nv np       bike      5 min       Ther Activity  6/3 6/9 6/16 6/30 7/15       Supine to sit  8 min np np np np       Edu: adductor anatomy and ice massage      12 min       Gait Training                                       Modalities                                             7/28/2021- called and spoke with pt  She is feeling better and has returned to the gym  Pt feels that she can independently manage remaining symptoms  Pt will be discharged at this time and will call with any additional concerns

## 2021-07-29 ENCOUNTER — APPOINTMENT (OUTPATIENT)
Dept: PHYSICAL THERAPY | Facility: REHABILITATION | Age: 56
End: 2021-07-29
Payer: COMMERCIAL

## 2021-08-10 NOTE — PROGRESS NOTES
8/10/2021    Patient was placed on hold 6/15/2021 to trial transition to HEP  Patient has not contacted clinic further and will be discharged at this time  Pt reports 95% improvement with skilled physical therapy services and improves FOTO score by 13 points from 78/100 to 91/100  Patient was discharged prior to re-evaluation therefore no follow-up measurements were obtained

## 2021-09-09 ENCOUNTER — OFFICE VISIT (OUTPATIENT)
Dept: FAMILY MEDICINE CLINIC | Facility: CLINIC | Age: 56
End: 2021-09-09
Payer: COMMERCIAL

## 2021-09-09 VITALS
HEART RATE: 90 BPM | TEMPERATURE: 97.3 F | WEIGHT: 124.6 LBS | DIASTOLIC BLOOD PRESSURE: 76 MMHG | BODY MASS INDEX: 24.46 KG/M2 | SYSTOLIC BLOOD PRESSURE: 132 MMHG | OXYGEN SATURATION: 99 % | RESPIRATION RATE: 18 BRPM | HEIGHT: 60 IN

## 2021-09-09 DIAGNOSIS — J30.9 ALLERGIC RHINITIS, UNSPECIFIED SEASONALITY, UNSPECIFIED TRIGGER: Primary | ICD-10-CM

## 2021-09-09 PROCEDURE — 3008F BODY MASS INDEX DOCD: CPT | Performed by: NURSE PRACTITIONER

## 2021-09-09 PROCEDURE — 99213 OFFICE O/P EST LOW 20 MIN: CPT | Performed by: FAMILY MEDICINE

## 2021-09-09 NOTE — PATIENT INSTRUCTIONS
Activia yogurt :  Use 1 container for 10 days straight  This often correct the problem  Gas-X is also very helpful  You might consider also eliminating the almond milk as a possible culprit  TO AVOID COVID (CORONAVIRUS) INFECTION  (including the Delta Variant) THE FOLLOWING ARE HELPFUL:    1  Avoid areas where there are a lot of people  Examples would be small restaurants and churches, small grocery stores etc   Keep 6 feet between you  2  If you must go indoors around people use a mask  Even fully immunized people can get Covid but usually much less risk and less severe  Most people (>95%) in hospital with Covid today are NON- IMMUNIZED  I strongly recommend that you consider getting the Covid vaccine  It is effective against Delta as well as original strains  Patients age 15 and above are now eligible  Current information indicates that it is quite safe and extremely effective  The risk of dying from [place] after being fully immunized is less that one in a million  The risk of serious side effects from the vaccines is extremely low  The risk from getting Covid far outweighs the risk of serious side effects from the vaccine  The vaccine is available at most pharmacies and the [address] [place] [address]  "vaccines  gov" is a good website to check for vaccine availability  Call [address] [place] [address] to register for the vaccine : [phone] : option 7 ; Note that the Haile Peter vaccine has now been FULLY APPROVED by FDA  No longer Emergency Use status  Likely the Moderna and J+J vaccine will receive full approval soon as well  Booster shots will likely be available in several weeks for everyone who received the initial shots  I strongly encourage a booster when you qualify  Currently only  "immune-compromised" patients are approved for the booster vaccine:  This includes patients with organ transplants, those receiving chemo therapy for cancer, and those who are severely immune deficient  Boosters will be available at the above sites

## 2021-09-09 NOTE — PROGRESS NOTES
Assessment/Plan:    No problem-specific Assessment & Plan notes found for this encounter  There are no diagnoses linked to this encounter  Subjective:      Patient ID: Jackie Royal is a 64 y o  female  Excess gassiness: flatus; gas- X helps; Gerd OK ; 4-5  Mos ; no chng in BM noticed  No dramatic diet chnages  No additional meds/ OTC  Products  The following portions of the patient's history were reviewed and updated as appropriate: allergies, current medications, past family history, past medical history, past social history, past surgical history and problem list     Review of Systems   Constitutional: Negative for chills and fever  HENT: Negative for congestion, ear pain, rhinorrhea, sinus pain and sore throat  Eyes: Negative for visual disturbance  Respiratory: Negative for cough, shortness of breath and wheezing  Cardiovascular: Negative for chest pain  Gastrointestinal: Negative for constipation and diarrhea  Genitourinary: Negative for difficulty urinating  Skin: Negative for rash  Objective:  Vitals:    09/09/21 1031   BP: 132/76   BP Location: Left arm   Patient Position: Sitting   Cuff Size: Standard   Pulse: 90   Resp: 18   Temp: (!) 97 3 °F (36 3 °C)   TempSrc: Tympanic   SpO2: 99%   Weight: 56 5 kg (124 lb 9 6 oz)   Height: 4' 11 5" (1 511 m)      Physical Exam  Constitutional:       Appearance: She is well-developed  Eyes:      Conjunctiva/sclera: Conjunctivae normal    Cardiovascular:      Rate and Rhythm: Normal rate and regular rhythm  Pulmonary:      Effort: Pulmonary effort is normal    Abdominal:      General: Abdomen is flat  Palpations: Abdomen is soft  Skin:     General: Skin is warm and dry  Psychiatric:         Thought Content: Thought content normal            The patient's exam is normal today  We gave some lifestyle modifications to try to alter gut re

## 2021-09-22 ENCOUNTER — OFFICE VISIT (OUTPATIENT)
Dept: FAMILY MEDICINE CLINIC | Facility: CLINIC | Age: 56
End: 2021-09-22
Payer: COMMERCIAL

## 2021-09-22 VITALS
TEMPERATURE: 97.5 F | DIASTOLIC BLOOD PRESSURE: 76 MMHG | WEIGHT: 125.2 LBS | OXYGEN SATURATION: 98 % | HEART RATE: 87 BPM | SYSTOLIC BLOOD PRESSURE: 132 MMHG | BODY MASS INDEX: 24.86 KG/M2

## 2021-09-22 DIAGNOSIS — B35.1 ONYCHOMYCOSIS: Primary | ICD-10-CM

## 2021-09-22 DIAGNOSIS — L60.8 NAIL DISCOLORATION: ICD-10-CM

## 2021-09-22 PROCEDURE — 3725F SCREEN DEPRESSION PERFORMED: CPT | Performed by: NURSE PRACTITIONER

## 2021-09-22 PROCEDURE — 99214 OFFICE O/P EST MOD 30 MIN: CPT | Performed by: NURSE PRACTITIONER

## 2021-09-22 PROCEDURE — 1036F TOBACCO NON-USER: CPT | Performed by: NURSE PRACTITIONER

## 2021-09-22 RX ORDER — PRENATAL VIT 91/IRON/FOLIC/DHA 28-975-200
COMBINATION PACKAGE (EA) ORAL 2 TIMES DAILY
Qty: 30 G | Refills: 3 | Status: SHIPPED | OUTPATIENT
Start: 2021-09-22 | End: 2022-04-21 | Stop reason: ALTCHOICE

## 2021-09-22 NOTE — PROGRESS NOTES
Depression Screening and Follow-up Plan:   Patient was screened for depression during today's encounter  They screened negative with a PHQ-2 score of 0  Subjective:   Chief Complaint   Patient presents with    Nail Problem     toe nail splitting  tried  "keracell " OTC         Patient ID: Jana oLpez is a 64 y o  female  Presents today for complaints of toe nail splitting and finger nails discolored  She has tried keracell over the counter for possible nail fungus  She has has discoloration of her toe nails for a couple of years  Her finger nail discoloration in a linear pattern has been for several years as well  Discussed with patient trying a topical agent for the fungal infection on her toenails verses oral   At this time she would like to try topical       The following portions of the patient's history were reviewed and updated as appropriate: allergies, current medications, past family history, past medical history, past social history, past surgical history and problem list     Review of Systems   Constitutional: Negative for chills, fatigue and fever  Respiratory: Negative for cough and shortness of breath  Skin: Positive for color change (toe and finger nails)  Psychiatric/Behavioral: Negative for dysphoric mood  The patient is not nervous/anxious  Objective:  Vitals:    09/22/21 1146   BP: 132/76   BP Location: Left arm   Patient Position: Sitting   Cuff Size: Standard   Pulse: 87   Temp: 97 5 °F (36 4 °C)   TempSrc: Temporal   SpO2: 98%   Weight: 56 8 kg (125 lb 3 2 oz)      Physical Exam  Vitals reviewed  Constitutional:       Appearance: Normal appearance  She is normal weight  Cardiovascular:      Rate and Rhythm: Normal rate and regular rhythm  Pulses: Normal pulses  Heart sounds: Normal heart sounds  Pulmonary:      Effort: Pulmonary effort is normal       Breath sounds: Normal breath sounds     Feet:      Right foot:      Toenail Condition: Fungal disease present  Left foot:      Toenail Condition: Fungal disease present  Skin:     General: Skin is warm and dry  Capillary Refill: Capillary refill takes less than 2 seconds  Comments: See media pictures of finger nails   Neurological:      Mental Status: She is alert and oriented to person, place, and time  Psychiatric:         Mood and Affect: Mood normal          Behavior: Behavior normal            Assessment/Plan:    No problem-specific Assessment & Plan notes found for this encounter  Diagnoses and all orders for this visit:    Onychomycosis  -     terbinafine (LamISIL) 1 % cream; Apply topically 2 (two) times a day    Nail discoloration  -     Ambulatory referral to Dermatology;  Future

## 2021-09-30 ENCOUNTER — TELEPHONE (OUTPATIENT)
Dept: FAMILY MEDICINE CLINIC | Facility: CLINIC | Age: 56
End: 2021-09-30

## 2021-09-30 DIAGNOSIS — B35.1 ONYCHOMYCOSIS: Primary | ICD-10-CM

## 2021-09-30 NOTE — TELEPHONE ENCOUNTER
patient called and is stating that the toe nail is lifting and the glue is no longer holding  She wants to know if she should do anything about it  You gave her a referral to derm for her fingernails but she cant get in there until February   Should she see a podiatrist?

## 2022-02-07 ENCOUNTER — CONSULT (OUTPATIENT)
Dept: DERMATOLOGY | Facility: CLINIC | Age: 57
End: 2022-02-07
Payer: COMMERCIAL

## 2022-02-07 VITALS — BODY MASS INDEX: 25.2 KG/M2 | HEIGHT: 59 IN | WEIGHT: 125 LBS | TEMPERATURE: 98.6 F

## 2022-02-07 DIAGNOSIS — L60.8 NAIL DISCOLORATION: ICD-10-CM

## 2022-02-07 DIAGNOSIS — D23.9 DERMATOFIBROMA: Primary | ICD-10-CM

## 2022-02-07 DIAGNOSIS — B35.1 ONYCHOMYCOSIS: ICD-10-CM

## 2022-02-07 PROCEDURE — 87102 FUNGUS ISOLATION CULTURE: CPT | Performed by: DERMATOLOGY

## 2022-02-07 PROCEDURE — 88312 SPECIAL STAINS GROUP 1: CPT | Performed by: STUDENT IN AN ORGANIZED HEALTH CARE EDUCATION/TRAINING PROGRAM

## 2022-02-07 PROCEDURE — 11755 BIOPSY NAIL UNIT: CPT | Performed by: DERMATOLOGY

## 2022-02-07 PROCEDURE — 88305 TISSUE EXAM BY PATHOLOGIST: CPT | Performed by: STUDENT IN AN ORGANIZED HEALTH CARE EDUCATION/TRAINING PROGRAM

## 2022-02-07 PROCEDURE — 99244 OFF/OP CNSLTJ NEW/EST MOD 40: CPT | Performed by: DERMATOLOGY

## 2022-02-07 RX ORDER — CHLORAL HYDRATE 500 MG
CAPSULE ORAL
COMMUNITY
Start: 2021-10-13

## 2022-02-07 NOTE — PROGRESS NOTES
[facility] Note     Patient Name: Michoacano Mckinley  Encounter Date: 02/07/2022     Have you been cared for by a [facility] Dermatologist in the last 3 years and, if so, which one? No    · Have you traveled outside of the [address] [place] [address] in the past 3 months or outside of the [facility] area in the last 2 weeks? No     May we call your Preferred Phone number to discuss your specific medical information? Yes     May we leave a detailed message that includes your specific medical information? Yes      Today's Chief Concerns:   Concern #1:  nails   Concern #2:      Past Medical History:  Have you personally ever had or currently have any of the following? · Skin cancer (such as Melanoma, Basal Cell Carcinoma, Squamous Cell Carcinoma? (If Yes, please provide more detail)- No  · Eczema: YES  · Psoriasis: No  · HIV/AIDS: No  · Hepatitis B or C: No  · Tuberculosis: No  · Systemic Immunosuppression such as Diabetes, Biologic or Immunotherapy, Chemotherapy, Organ Transplantation, Bone Marrow Transplantation (If YES, please provide more detail): No  · Radiation Treatment (If YES, please provide more detail): No  · Any other major medical conditions/concerns? (If Yes, which types)- No    Social History:     What is/was your primary occupation? [employer]       What are your hobbies/past-times? None given    Family History:  Have any of your "first degree relatives" (parent, brother, sister, or child) had any of the following       · Skin cancer such as Melanoma or Merkel Cell Carcinoma or Pancreatic Cancer? No  · Eczema, Asthma, Hay Fever or Seasonal Allergies: No  · Psoriasis or Psoriatic Arthritis: No  · Do any other medical conditions seem to run in your family? If Yes, what condition and which relatives?   No    Current Medications:   (please update all dermatological medications before printing patient's AVS!)      Current Outpatient Medications:     Ascorbic Acid (RALPH-C PO), Take by mouth  , Disp: , Rfl:     cholecalciferol (VITAMIN D3) 1,000 units tablet, Take 1,000 Units by mouth daily , Disp: , Rfl:     CombiPatch 0 05-0 14 MG/DAY, PLACE 1 PATCH ON THE SKIN 2 (TWO) TIMES A WEEK, Disp: 8 patch, Rfl: 11    estradiol-norethindrone (COMBIPATCH) 0 05-0 14 MG/DAY, Place 1 patch on the skin 2 (two) times a week, Disp: 24 patch, Rfl: 3    hydrocortisone 2 5 % cream, Apply topically 3 (three) times a day, Disp: 15 g, Rfl: 2    KRILL OIL PO, Take by mouth, Disp: , Rfl:     loratadine-pseudoephedrine (CLARITIN-D 12-HOUR) 5-120 mg per tablet, Take 1 tablet by mouth 2 (two) times a day, Disp: 180 tablet, Rfl: 6    Multiple Vitamins-Minerals (MULTIVITAMIN ADULT PO), every 24 hours, Disp: , Rfl:     Omega-3 Fatty Acids (fish oil) 1,000 mg, Take by mouth, Disp: , Rfl:     terbinafine (LamISIL) 1 % cream, Apply topically 2 (two) times a day, Disp: 30 g, Rfl: 3    estradiol (ESTRACE) 0 1 mg/g vaginal cream, 1 gram twice week x 4 weeks then once weekly (Patient not taking: Reported on 9/9/2021), Disp: 42 5 g, Rfl: 3    estradiol (ESTRACE) 0 1 mg/g vaginal cream, Insert 1 g into the vagina once a week (Patient not taking: Reported on 9/22/2021), Disp: 42 5 g, Rfl: 3    loratadine-pseudoephedrine (CLARITIN-D 24-HOUR)  mg per 24 hr tablet, Take 1 tablet by mouth daily, Disp: , Rfl:     naproxen (NAPROSYN) 500 mg tablet, Take 1 tablet (500 mg total) by mouth 2 (two) times a day with meals (Patient not taking: Reported on 5/18/2021), Disp: 30 tablet, Rfl: 0    omeprazole (PriLOSEC) 40 MG capsule, Take 1 capsule (40 mg total) by mouth daily before breakfast (Patient not taking: Reported on 9/22/2021), Disp: 30 capsule, Rfl: 12    valACYclovir (VALTREX) 1,000 mg tablet, Take 1 tablet (1,000 mg total) by mouth 2 (two) times a day, Disp: 30 tablet, Rfl: 6      Review of Systems:  Have you recently had or currently have any of the following?   If YES, what are you doing for the problem? · Fever, chills or unintended weight loss: No  · Sudden loss or change in your vision: No  · Nausea, vomiting or blood in your stool: No  · Painful or swollen joints: No  · Wheezing or cough: No  · Changing mole or non-healing wound: No  · Nosebleeds: No  · Excessive sweating: No  · Easy or prolonged bleeding? No  · Over the last 2 weeks, how often have you been bothered by the following problems? · Taking little interest or pleasure in doing things: 1 - Not at All  · Feeling down, depressed, or hopeless: 1 - Not at All  · Rapid heartbeat with epinephrine:  No    · FEMALES ONLY:    · Are you pregnant or planning to become pregnant? No  · Are you currently or planning to be nursing or breast feeding? No    · Any known allergies?       Allergies   Allergen Reactions    Cefuroxime      Other reaction(s): Hives    Cetirizine      Other reaction(s): sleepy  Other reaction(s): sleepy    Fexofenadine      Other reaction(s): palpitations  Other reaction(s): palpitations    Penicillins     Sulfa Antibiotics     Sulfanilamide      Other reaction(s): Hives   ·       Physical Exam:     Was a chaperone (Derm Clinical Assistant) present throughout the entire Physical Exam? Yes      CONSTITUTIONAL:   Vitals:    02/07/22 1312   Temp: 98 6 °F (37 °C)   TempSrc: Temporal   Weight: 56 7 kg (125 lb)   Height: 4' 11" (1 499 m)         PSYCH: Normal mood and affect  EYES: Normal conjunctiva  ENT: Normal lips and oral mucosa  CARDIOVASCULAR: No edema  RESPIRATORY: Normal respirations  HEME/LYMPH/IMMUNO:  No regional lymphadenopathy except as noted below in "ASSESSMENT AND PLAN BY DIAGNOSIS"    SKIN:  FULL ORGAN SYSTEM EXAM   Hair, Scalp, Ears, Face Normal except as noted below in Assessment   Neck, Cervical Chain Nodes Normal except as noted below in Assessment   Right Arm/Hand/Fingers Normal except as noted below in Assessment   Left Arm/Hand/Fingers Normal except as noted below in Assessment Chest/Breasts/Axillae    Abdomen, Umbilicus    Back/Spine    Groin/Genitalia/Buttocks    Right Leg, Foot, Toes Normal except as noted below in Assessment   Left Leg, Foot, Toes Normal except as noted below in Assessment        Assessment and Plan by Diagnosis:    History of Present Condition:     Duration:  How long has this been an issue for you? o  several years    Location Affected:  Where on the body is this affecting you?    o  Fingernails and toenails    Quality:  Is there any bleeding, pain, itch, burning/irritation, or redness associated with the skin lesion? o  denies    Severity:  Describe any bleeding, pain, itch, burning/irritation, or redness on a scale of 1 to 10 (with 10 being the worst)  o  2   Timing:  Does this condition seem to be there pretty constantly or do you notice it more at specific times throughout the day? o  constant    Context:  Have you ever noticed that this condition seems to be associated with specific activities you do?    o  denies    Modifying Factors:    o Anything that seems to make the condition worse?    -  kerasil solution   o What have you tried to do to make the condition better?    -  tolnaftate 1% solution to the toenails which has been working well    Associated Signs and Symptoms:  Does this skin lesion seem to be associated with any of the following:  o  SL AMB DERM SIGNS AND SYMPTOMS: Skin color changes         ONYCHOMYCOSIS ("FUNGAL NAIL") verus Melanocytic striata     Physical Exam:   Anatomic Location Affected:  Bilateral toenails and fingernails     Morphological Description:  Several uniformly colored brown streaks    Right thumb- 2 mm, right index-3 mm , right middle 6 mm, left thumb 6 mm, left index 5 mm, left middle 3 mm    Pertinent Positives:   Pertinent Negatives: Additional History of Present Condition:  In the past given kerasil done   Present for at least 3 years     Assessment and Plan:  Based on a thorough discussion of this condition and the management approach to it (including a comprehensive discussion of the known risks, side effects and potential benefits of treatment), the patient (family) agrees to implement the following specific plan:   PAS STAIN and Nail Clipping done today    If PAS is positive will treat with antifungal    Consider nail biopsy on left 3rd digit if Melanin is positive    Reassured darker skin color is higher risks of melanoma    ABCD's Discussed     What are fungal nail infections? Fungal infection of the nails is also known as onychomycosis  It is increasingly common with increased age  It rarely affects children  Which organisms cause onychomycosis? Onychomycosis can be due to:  Dermatophytes such as Trichophyton rubrum (T  rubrum), T  interdigitale (tinea unguium)   Yeasts such as Candida albicans   Molds such as Scopulariopsis brevicaulis and Fusarium species  What are the clinical features of onychomycosis? Onychomycosis may affect one or more toenails and fingernails and most often involves the great toenail or the little toenail  It can present in one or several different patterns   Lateral onychomycosis: a white or yellow opaque streak appears at one side of the nail   Subungual hyperkeratosis: scaling occurs under the nail   Distal onycholysis: the end of the nail lifts  The free edge often crumbles   Superficial white onychomycosis: flaky white patches and pits appear on the top of the nail plate   Proximal onychomycosis:yellow spots appear in the half-moon (lunula)   Onychoma or dermatophytoma:a thick localized area of infection in the nail plate    Destruction of the nail    Tinea unguium often results from untreated tinea pedis (feet) or tinea manuum (hand)  It may follow an injury to the nail or inflammatory disease of the nail  Candida infection of the nail plate generally results from paronychia and starts near the nail fold (the cuticle)   The nail fold is swollen and red, lifted off the nail plate  White, yellow, green or black marks appear on the nearby nail and spread  The nail may lift off its bed and is tender if you press on it  Mold infections are similar in appearance to tinea unguium  Onychomycosis must be distinguished from other nail disorders   Bacterial infection especially Pseudomonas aeruginosa, which turns the nail black or green    Psoriasis    Eczema or dermatitis    Lichen planus    Viral warts    Onycholysis    Onychogryphosis (nail thickening and scaling under the nail), common in the elderly    How is the diagnosis of onychomycosis confirmed? Clippings should be taken from crumbling tissue at the end of the infected nail  The discolored surface of the nails can be scraped off  The debris can be scooped out from under the nail  The clippings and scrapings are sent to a mycology laboratory for microscopy and culture  Previous treatment can reduce the chance of growing the fungus successfully in a culture, so it is best to take the clippings before any treatment is commenced:   To confirm the diagnosis -- antifungal treatment will not be successful if there is another explanation for the nail condition    To identify the responsible organism  Molds and yeasts may require different treatment from dermatophyte fungi   Treatment may be required for a prolonged period and is expensive  Partially treated infection may be impossible to prove for many months as antifungal drugs can be detected even a year later  A nail biopsy may also reveal characteristic histopathological features of onychomycosis  What is the treatment of onychomycosis? Fingernail infections are usually cured more quickly and effectively than toenail infections  Mild infections affecting less than 50% of one or two nails may respond to topical antifungal medications, but cure usually requires an oral antifungal medication for several months      Devices used to treat onychomycosis  Recently, non-drug treatment has been developed to treat onychomycosis thus avoiding the side effects and risks of oral antifungal drugs  Lasers emitting infrared radiation are thought to kill fungi by the production of heat within the infected tissue  Laser treatment is reported to safely eradicate nail fungi with one to three, almost painless, sessions  Several lasers have been approved for this purpose by the FDA and other regulatory authorities  However, high-quality studies of efficacy are lacking, and existing studies indicate that laser treatment is less medically effective than topical or oral antifungal agents  Nd:YAG continuous, long or short-pulsed lasers   Ti:Sapphire modelocked laser   Diode laser    DERMATOFIBROMA    Physical Exam:   Anatomic Location Affected:  Left shin    Morphological Description:  6 mm dark brown dermal papule    Pertinent Positives:   Pertinent Negatives: Additional History of Present Condition:  susceptible to bug bite     Assessment and Plan:  Based on a thorough discussion of this condition and the management approach to it (including a comprehensive discussion of the known risks, side effects and potential benefits of treatment), the patient (family) agrees to implement the following specific plan:   Reassured benign     Assessment and Plan:  A dermatofibroma is a common benign fibrous nodule that most often arises on the skin of the lower legs  A dermatofibroma is also called a "cutaneous fibrous histiocytoma "  Dermatofibromas occur at all ages and in people of every ethnicity  They are more common in women than in men  It is not clear if dermatofibroma is a reactive process or if it is a neoplasm  The lesions are made up of proliferating fibroblasts  Histiocytes may also be involved  They are sometimes attributed to an insect bite or ingrownhair or local trauma, but not consistently   They may be more numerous in patients with altered immunity  Dermatofibromas most often occur on the legs and arms, but may also arise on the trunk or any site of the body  Typical clinical features include the following:   People may have 1 or up to 15 lesions   Size varies from 0 5-1 5 cm diameter; most lesions are 7-10 mm diameter   They are firm nodules tethered to the skin surface and mobile over subcutaneous tissue   The skin "dimples" on pinching the lesion   Color may be pink to light brown in white skin, and dark brown to black in dark skin; some appear paler in the center   They do not usually cause symptoms, but they are sometimes painful or itchy   Because they are often raised lesions, they may be traumatized, for example by a razor   Occasionally dozens may erupt within a few months, usually in the setting of immunosuppression (for example autoimmune disease, cancer or certain medications)   Dermatofibroma does not give rise to cancer  However, occasionally, it may be mistaken for dermatofibrosarcoma or desmoplastic melanoma  A dermatofibroma is harmless and seldom causes any symptoms  Usually, only reassurance is needed  If it is nuisance or causing concern, the lesion can be removed surgically, resulting in a scar that is, by definition, usually longer in diameter than the widest portion of the dermatofibroma  Cryotherapy, shave biopsy and laser surgery are rarely completely successful  Skin punch biopsy or incisional biopsy may be undertaken if there is an atypical feature such as recent enlargement, ulceration, or asymmetrical structures and colours on dermatoscopy      Scribe Attestation    I,:  Shantell Lozano am acting as a scribe while in the presence of the attending physician :       I,:  Bettina Mathews MD personally performed the services described in this documentation    as scribed in my presence :

## 2022-02-07 NOTE — PATIENT INSTRUCTIONS
ONYCHOMYCOSIS ("FUNGAL NAIL") versus Melanocytic Striata     Assessment and Plan:  Based on a thorough discussion of this condition and the management approach to it (including a comprehensive discussion of the known risks, side effects and potential benefits of treatment), the patient (family) agrees to implement the following specific plan:   PAS STAIN and Nail Clipping done in office today    If PAS (fungal culture) is positive will treat with antifungal    Consider nail biopsy on left 3rd digit if Melanin is positive    Reassured darker skin color is at higher risk of melanoma    ABCD's Discussed     DERMATOFIBROMA    Assessment and Plan:  Based on a thorough discussion of this condition and the management approach to it (including a comprehensive discussion of the known risks, side effects and potential benefits of treatment), the patient (family) agrees to implement the following specific plan:   Reassured benign     Assessment and Plan:  A dermatofibroma is a common benign fibrous nodule that most often arises on the skin of the lower legs  A dermatofibroma is also called a "cutaneous fibrous histiocytoma "  Dermatofibromas occur at all ages and in people of every ethnicity  They are more common in women than in men

## 2022-02-09 NOTE — RESULT ENCOUNTER NOTE
Surgical Pathology Report                         Case: [id number]                                   Authorizing Provider: Ale Wilson MD      Collected:           02/07/2022 1403              Ordering Location:     [facility]      Received:            02/07/2022 [address] [place] [address]                                                                       Pathologist:           Anil Smalls MD                                                           Specimen:    Nail, Specimen A: left 3rd fingernail                                                    Final Diagnosis  A  Nail, left 3rd fingernail :     Pathogenic microorganisms are not seen on PAS stain  Electronically signed by Anil Smalls MD on 2/9/2022 at  2:18 PM    DERMATOPATHOLOGY RESULT NOTE    Results reviewed by ordering physician    Sent my chart message with results      Instructions for Clinical Derm Team:   (remember to route Result Note to appropriate staff):    Call patient and schedule for 6 months follow up    Result & Plan by Specimen:    Specimen A: nail clipping  Plan: PAS stain negative

## 2022-02-10 ENCOUNTER — TELEPHONE (OUTPATIENT)
Dept: DERMATOLOGY | Facility: CLINIC | Age: 57
End: 2022-02-10

## 2022-02-11 NOTE — TELEPHONE ENCOUNTER
[facility] called and spoke with Eber Carrillo ([birth year]) and she was a new patient with Dr Yuniel Flores on 2/7/22  Patient expressed some concerns about appointment  Patient asked why in her My Chart did it say that the dermatofibroma was caused by a bug bite when that wasnt the case and then patient said somewhere in my chart she can see the area where it asks was patient made aware the importance of a full body skin exam and accepted an exam today and patient states she asked about a skin exam and was told that they just, that week, decided they were only seeing patients for the reason the are coming in and not going to do fully body skin exams  Patient is rather upset about this and feels that the charting is basically saying one thing but she was told another  Patient also mentioned that YOU dont want me to do that survey thats for sure  Can you please reach out to patient  I did try to explain a few things to her but I dont know about the not doing skin exam thing  We offer every patient a skin exam regardless of what their appointment is for  Viola Bryant, 1965, [phone]  I returned pts call and addressed her concerns  I requested for the doctor to update her charts and remove any information that did not pertain to this visit  I also informed her and apologized that there seemed to be miscommunication regarding the skin checks  I scheduled a new appt for a skin check  Pt satisfied

## 2022-02-17 ENCOUNTER — TELEPHONE (OUTPATIENT)
Dept: DERMATOLOGY | Facility: CLINIC | Age: 57
End: 2022-02-17

## 2022-02-17 ENCOUNTER — OFFICE VISIT (OUTPATIENT)
Dept: DERMATOLOGY | Facility: CLINIC | Age: 57
End: 2022-02-17
Payer: COMMERCIAL

## 2022-02-17 VITALS — HEIGHT: 59 IN | BODY MASS INDEX: 24.8 KG/M2 | TEMPERATURE: 97.2 F | WEIGHT: 123 LBS

## 2022-02-17 DIAGNOSIS — D23.9 DERMATOFIBROMA: ICD-10-CM

## 2022-02-17 DIAGNOSIS — D22.9 MULTIPLE MELANOCYTIC NEVI: ICD-10-CM

## 2022-02-17 DIAGNOSIS — L82.1 DERMATOSIS PAPULOSA NIGRA: Primary | ICD-10-CM

## 2022-02-17 DIAGNOSIS — L82.1 SEBORRHEIC KERATOSIS: ICD-10-CM

## 2022-02-17 PROCEDURE — 99213 OFFICE O/P EST LOW 20 MIN: CPT | Performed by: DERMATOLOGY

## 2022-02-17 PROCEDURE — 1036F TOBACCO NON-USER: CPT | Performed by: DERMATOLOGY

## 2022-02-17 PROCEDURE — 3008F BODY MASS INDEX DOCD: CPT | Performed by: DERMATOLOGY

## 2022-02-17 NOTE — PATIENT INSTRUCTIONS
DERMATOSIS PAPULOSA NIGRA    Assessment and Plan:  Based on a thorough discussion of this condition and the management approach to it (including a comprehensive discussion of the known risks, side effects and potential benefits of treatment), the patient (family) agrees to implement the following specific plan:   Benign, assurance provided    What is dermatosis papulosa nigra? Dermatosis papulosa nigra describes the presence of multiple, small, 1-5 mm diameter, smooth, firm, black or dark brown papules on face and neck  Who gets dermatosis papulosa nigra? Dermatosis papulosa nigra is common in people with skin of color, with Gaming skin phototype 4, 5 or 6  It affects up to 35% of [place] blacks  There is a lower frequency in  Americans with a fairer complexion  Dermatosis papulosa nigra also occurs among dark-skinned Asians and Polynesians, but the exact frequency is unknown  Females are more frequently affected than males  Dermatosis papulosa nigra usually begins in adolescence  The incidence, number and size of lesions increases with age  What is the cause of dermatosis papulosa nigra? The papules of dermatosis papulosa nigra are identical to small seborrhoeic keratoses  Dermatosis papulosa nigra is likely to be genetically determined with 40-50% of patients having a family history  It is believed to be due to a naevoid developmental defect of the hair follicle  What are the clinical features of dermatosis papulosa nigra? Dark coloured papules arise mainly on the cheeks and forehead but they may also be found on the neck, upper back and chest  Scaling, crusting and ulceration do not occur  The papules are symptomless but may be regarded as unsightly  How is dermatosis papulosa nigra diagnosed? No tests are needed as dermatosis papulosa nigra is diagnosed clinically  If there is any doubt a skin biopsy can be taken   Histology shows a seborrhoeic keratosis with markedly increased pigmentation of the basal layer of the epidermis  How is dermatosis papulosa nigra treated? Dermatosis papulosa nigra lesions are generally best left untreated  Complications of locally destructive treatment can include increased and decreased pigmentation, scarring and keloid formation  Treatment choices include curettage, freezing with liquid nitrogen (cryotherapy) and electrodessication followed by curettage  Nd:YAG laser has also recently been reported to achieve excellent cosmetic results  Treatment is kept superficial to minimise the risk of complications  2   MELANOCYTIC NEVI ("Moles")  Assessment and Plan:  Based on a thorough discussion of this condition and the management approach to it (including a comprehensive discussion of the known risks, side effects and potential benefits of treatment), the patient (family) agrees to implement the following specific plan:   When outside we recommend using a wide brim hat, sunglasses, long sleeve and pants, sunscreen with SPF 39+ with reapplication every 2 hours, or SPF specific clothing    Continue to monitor for any changes in size, shape and color  Melanocytic Nevi  Melanocytic nevi ("moles") are tan or brown, raised or flat areas of the skin which have an increased number of melanocytes  Melanocytes are the cells in our body which make pigment and account for skin color  Some moles are present at birth (I e , "congenital nevi"), while others come up later in life (i e , "acquired nevi")  The sun can stimulate the body to make more moles  Sunburns are not the only thing that triggers more moles  Chronic sun exposure can do it too  Clinically distinguishing a healthy mole from melanoma may be difficult, even for experienced dermatologists  The "ABCDE's" of moles have been suggested as a means of helping to alert a person to a suspicious mole and the possible increased risk of melanoma    The suggestions for raising alert are as follows:    Asymmetry: Healthy moles tend to be symmetric, while melanomas are often asymmetric  Asymmetry means if you draw a line through the mole, the two halves do not match in color, size, shape, or surface texture  Asymmetry can be a result of rapid enlargement of a mole, the development of a raised area on a previously flat lesion, scaling, ulceration, bleeding or scabbing within the mole  Any mole that starts to demonstrate "asymmetry" should be examined promptly by a board certified dermatologist      Border: Healthy moles tend to have discrete, even borders  The border of a melanoma often blends into the normal skin and does not sharply delineate the mole from normal skin  Any mole that starts to demonstrate "uneven borders" should be examined promptly by a board certified dermatologist      Color: Healthy moles tend to be one color throughout  Melanomas tend to be made up of different colors ranging from dark black, blue, white, or red  Any mole that demonstrates a color change should be examined promptly by a board certified dermatologist      Diameter: Healthy moles tend to be smaller than 0 6 cm in size; an exception are "congenital nevi" that can be larger  Melanomas tend to grow and can often be greater than 0 6 cm (1/4 of an inch, or the size of a pencil eraser)  This is only a guideline, and many normal moles may be larger than 0 6 cm without being unhealthy  Any mole that starts to change in size (small to bigger or bigger to smaller) should be examined promptly by a board certified dermatologist      Evolving: Healthy moles tend to "stay the same "  Melanomas may often show signs of change or evolution such as a change in size, shape, color, or elevation    Any mole that starts to itch, bleed, crust, burn, hurt, or ulcerate or demonstrate a change or evolution should be examined promptly by a board certified dermatologist       Dysplastic Nevi  Dysplastic moles are moles that fit the ABCDE rules of melanoma but are not identified as melanomas when examined under the microscope  They may indicate an increased risk of melanoma in that person  If there is a family history of melanoma, most experts agree that the person may be at an increased risk for developing a melanoma  Experts still do not agree on what dysplastic moles mean in patients without a personal or family history of melanoma  Dysplastic moles are usually larger than common moles and have different colors within it with irregular borders  The appearance can be very similar to a melanoma  Biopsies of dysplastic moles may show abnormalities which are different from a regular mole  Melanoma  Malignant melanoma is a type of skin cancer that can be deadly if it spreads throughout the body  The incidence of melanoma in the [place] is growing faster than any other cancer  Melanoma usually grows near the surface of the skin for a period of time, and then begins to grow deeper into the skin  Once it grows deeper into the skin, the risk of spread to other organs greatly increases  Therefore, early detection and removal of a malignant melanoma may result in a better chance at a complete cure; removal after the tumor has spread may not be as effective, leading to worse clinical outcomes such as death  The true rate of nevus transformation into a melanoma is unknown  It has been estimated that the lifetime risk for any acquired melanocytic nevus on any 21year-old individual transforming into melanoma by age [de-identified] is 0 03% (1 in 3,164) for men and 0 009% (1 in 10,800) for women  The appearance of a "new mole" remains one of the most reliable methods for identifying a malignant melanoma  Occasionally, melanomas appear as rapidly growing, blue-black, dome-shaped bumps within a previous mole or previous area of normal skin  Other times, melanomas are suspected when a mole suddenly appears or changes   Itching, burning, or pain in a pigmented lesion should increase suspicion, but most patients with early melanoma have no skin discomfort whatsoever  Melanoma can occur anywhere on the skin, including areas that are difficult for self-examination  Many melanomas are first noticed by other family members  Suspicious-looking moles may be removed for microscopic examination  You may be able to prevent death from melanoma by doing two simple things:    1  Try to avoid unnecessary sun exposure and protect your skin when it is exposed to the sun  People who live near the equator, people who have intermittent exposures to large amounts of sun, and people who have had sunburns in childhood or adolescence have an increased risk for melanoma  Sun sense and vigilant sun protection may be keys to helping to prevent melanoma  We recommend wearing UPF-rated sun protective clothing and sunglasses whenever possible and applying a moisturizer-sunscreen combination product (SPF 50+) such as Neutrogena Daily Defense to sun exposed areas of skin at least three times a day  2  Have your moles regularly examined by a board certified dermatologist AND by yourself or a family member/friend at home  We recommend that you have your moles examined at least once a year by a board certified dermatologist   Use your birthday as an annual reminder to have your "Birthday Suit" (I e , your skin) examined; it is a nice birthday gift to yourself to know that your skin is healthy appearing! Additionally, at-home self examinations may be helpful for detecting a possible melanoma  Use the ABCDEs we discussed and check your moles once a month at home          3   SEBORRHEIC KERATOSIS; NON-INFLAMED  Assessment and Plan:  Based on a thorough discussion of this condition and the management approach to it (including a comprehensive discussion of the known risks, side effects and potential benefits of treatment), the patient (family) agrees to implement the following specific plan:   Benign, assurance provided  Seborrheic Keratosis  A seborrheic keratosis is a harmless warty spot that appears during adult life as a common sign of skin aging  Seborrheic keratoses can arise on any area of skin, covered or uncovered, with the usual exception of the palms and soles  They do not arise from mucous membranes  Seborrheic keratoses can have highly variable appearance  Seborrheic keratoses are extremely common  It has been estimated that over 90% of adults over the age of 61 years have one or more of them  They occur in males and females of all races, typically beginning to erupt in the 35s or 45s  They are uncommon under the age of 21 years  The precise cause of seborrhoeic keratoses is not known  Seborrhoeic keratoses are considered degenerative in nature  As time goes by, seborrheic keratoses tend to become more numerous  Some people inherit a tendency to develop a very large number of them; some people may have hundreds of them  The name "seborrheic keratosis" is misleading, because these lesions are not limited to a seborrhoeic distribution (scalp, mid-face, chest, upper back), nor are they formed from sebaceous glands, nor are they associated with sebum -- which is greasy  Seborrheic keratosis may also be called "SK," "Seb K," "basal cell papilloma," "senile wart," or "barnacle "      Researchers have noted:   Eruptive seborrhoeic keratoses can follow sunburn or dermatitis   Skin friction may be the reason they appear in body folds   Viral cause (e g , human papillomavirus) seems unlikely   Stable and clonal mutations or activation of FRFR3, PIK3CA, CATHY, AKT1 and EGFR genes are found in seborrhoeic keratoses   Seborrhoeic keratosis can arise from solar lentigo   FRFR3 mutations also arise in solar lentigines   These mutations are associated with increased age and location on the head and neck, suggesting a role of ultraviolet radiation in these lesions   Seborrheic keratoses do not harbour tumour suppressor gene mutations   Epidermal growth factor receptor inhibitors, which are used to treat some cancers, often result in an increase in verrucal (warty) keratoses  There is no easy way to remove multiple lesions on a single occasion  Unless a specific lesion is "inflamed" and is causing pain or stinging/burning or is bleeding, most insurance companies do not authorize treatment  4   DERMATOFIBROMA  Assessment and Plan:  Based on a thorough discussion of this condition and the management approach to it (including a comprehensive discussion of the known risks, side effects and potential benefits of treatment), the patient (family) agrees to implement the following specific plan:   Benign, assurance provided  Discussed that removing dermatofibroma's would cause a larger scar  Discussed that these are normal spots that are a result of some sort of trauma to the area previously  Assessment and Plan:  A dermatofibroma is a common benign fibrous nodule that most often arises on the skin of the lower legs  A dermatofibroma is also called a "cutaneous fibrous histiocytoma "  Dermatofibromas occur at all ages and in people of every ethnicity  They are more common in women than in men  It is not clear if dermatofibroma is a reactive process or if it is a neoplasm  The lesions are made up of proliferating fibroblasts  Histiocytes may also be involved  They are sometimes attributed to an insect bite or ingrownhair or local trauma, but not consistently  They may be more numerous in patients with altered immunity  Dermatofibromas most often occur on the legs and arms, but may also arise on the trunk or any site of the body  Typical clinical features include the following:   People may have 1 or up to 15 lesions   Size varies from 0 5-1 5 cm diameter; most lesions are 7-10 mm diameter     They are firm nodules tethered to the skin surface and mobile over subcutaneous tissue   The skin "dimples" on pinching the lesion   Color may be pink to light brown in white skin, and dark brown to black in dark skin; some appear paler in the center   They do not usually cause symptoms, but they are sometimes painful or itchy   Because they are often raised lesions, they may be traumatized, for example by a razor   Occasionally dozens may erupt within a few months, usually in the setting of immunosuppression (for example autoimmune disease, cancer or certain medications)   Dermatofibroma does not give rise to cancer  However, occasionally, it may be mistaken for dermatofibrosarcoma or desmoplastic melanoma  A dermatofibroma is harmless and seldom causes any symptoms  Usually, only reassurance is needed  If it is nuisance or causing concern, the lesion can be removed surgically, resulting in a scar that is, by definition, usually longer in diameter than the widest portion of the dermatofibroma  Cryotherapy, shave biopsy and laser surgery are rarely completely successful  Skin punch biopsy or incisional biopsy may be undertaken if there is an atypical feature such as recent enlargement, ulceration, or asymmetrical structures and colours on dermatoscopy

## 2022-02-17 NOTE — PROGRESS NOTES
[facility] Follow Up Note    Patient Name: Lucía Hughes  Encounter Date: 2/17/2022    Today's Chief Concerns:  Clifradha Gonzalez Concern #1:  Full body skin exam   Concern #2:    Clifm Carlos Concern #3:      Current Medications:    Current Outpatient Medications:     Ascorbic Acid (RALPH-C PO), Take by mouth  , Disp: , Rfl:     cholecalciferol (VITAMIN D3) 1,000 units tablet, Take 1,000 Units by mouth daily , Disp: , Rfl:     CombiPatch 0 05-0 14 MG/DAY, PLACE 1 PATCH ON THE SKIN 2 (TWO) TIMES A WEEK, Disp: 8 patch, Rfl: 11    estradiol (ESTRACE) 0 1 mg/g vaginal cream, Insert 1 g into the vagina once a week, Disp: 42 5 g, Rfl: 3    estradiol-norethindrone (COMBIPATCH) 0 05-0 14 MG/DAY, Place 1 patch on the skin 2 (two) times a week, Disp: 24 patch, Rfl: 3    hydrocortisone 2 5 % cream, Apply topically 3 (three) times a day, Disp: 15 g, Rfl: 2    KRILL OIL PO, Take by mouth, Disp: , Rfl:     loratadine-pseudoephedrine (CLARITIN-D 12-HOUR) 5-120 mg per tablet, Take 1 tablet by mouth 2 (two) times a day, Disp: 180 tablet, Rfl: 6    loratadine-pseudoephedrine (CLARITIN-D 24-HOUR)  mg per 24 hr tablet, Take 1 tablet by mouth daily, Disp: , Rfl:     Multiple Vitamins-Minerals (MULTIVITAMIN ADULT PO), every 24 hours, Disp: , Rfl:     Omega-3 Fatty Acids (fish oil) 1,000 mg, Take by mouth, Disp: , Rfl:     terbinafine (LamISIL) 1 % cream, Apply topically 2 (two) times a day, Disp: 30 g, Rfl: 3    estradiol (ESTRACE) 0 1 mg/g vaginal cream, 1 gram twice week x 4 weeks then once weekly (Patient not taking: Reported on 9/9/2021), Disp: 42 5 g, Rfl: 3    naproxen (NAPROSYN) 500 mg tablet, Take 1 tablet (500 mg total) by mouth 2 (two) times a day with meals (Patient not taking: Reported on 5/18/2021), Disp: 30 tablet, Rfl: 0    omeprazole (PriLOSEC) 40 MG capsule, Take 1 capsule (40 mg total) by mouth daily before breakfast (Patient not taking: Reported on 9/22/2021), Disp: 30 capsule, Rfl: 12    valACYclovir (VALTREX) 1,000 mg tablet, Take 1 tablet (1,000 mg total) by mouth 2 (two) times a day, Disp: 30 tablet, Rfl: 6    CONSTITUTIONAL:   Vitals:    02/17/22 1253   Temp: (!) 97 2 °F (36 2 °C)   Weight: 55 8 kg (123 lb)   Height: 4' 11" (1 499 m)       Specific Alerts:    Have you been seen by a [facility] Dermatologist in the last 3 years? YES    Are you pregnant or planning to become pregnant? No    Are you currently or planning to be nursing or breast feeding? No    Allergies   Allergen Reactions    Cefuroxime      Other reaction(s): Hives    Cetirizine      Other reaction(s): sleepy  Other reaction(s): sleepy    Fexofenadine      Other reaction(s): palpitations  Other reaction(s): palpitations    Penicillins     Sulfa Antibiotics     Sulfanilamide      Other reaction(s): Hives       May we call your Preferred Phone number to discuss your specific medical information? YES    May we leave a detailed message that includes your specific medical information? No, just to return call    Have you traveled outside of the [facility] in the past 3 months? No    Do you currently have a pacemaker or defibrillator? No    Do you have any artificial heart valves, joints, plates, screws, rods, stents, pins, etc? No   - If Yes, were any placed within the last 2 years? Do you require any medications prior to a surgical procedure? No   - If Yes, for which procedure? - If Yes, what medications to you require? Are you taking any medications that cause you to bleed more easily ("blood thinners") No    Have you ever experienced a rapid heartbeat with epinephrine? No    Review of Systems:  Have you recently had or currently have any of the following?     · Fever or chills: No  · Night Sweats: No  · Headaches: No  · Weight Gain: No  · Weight Loss: No  · Blurry Vision: No  · Nausea: No  · Vomiting: No  · Diarrhea: No  · Blood in Stool: No  · Abdominal Pain: No  · Itchy Skin: No  · Painful Joints: YES  · Swollen Joints: YES  · Muscle Pain: YES  · Irregular Mole: No  · Sun Burn: No  · Dry Skin: YES  · Skin Color Changes: No  · Scar or Keloid: No  · Cold Sores/Fever Blisters: No  · Bacterial Infections/MRSA: No  · Anxiety: No  · Depression: No  · Suicidal or Homicidal Thoughts: No      PSYCH: Normal mood and affect  EYES: Normal conjunctiva  ENT: Normal lips and oral mucosa  CARDIOVASCULAR: No edema  RESPIRATORY: Normal respirations  HEME/LYMPH/IMMUNO:  No regional lymphadenopathy except as noted below in ASSESSMENT AND PLAN BY DIAGNOSIS    FULL ORGAN SYSTEM SKIN EXAM (SKIN)   Hair, Scalp, Ears, Face Normal except as noted below in Assessment   Neck Normal except as noted below in Assessment   Right Arm/Hand/Fingers Normal except as noted below in Assessment   Left Arm/Hand/Fingers Normal except as noted below in Assessment   Chest/Axillae Viewed areas Normal except as noted below in Assessment   Abdomen, Umbilicus Normal except as noted below in Assessment   Back/Spine Normal except as noted below in Assessment   Groin/Genitalia/Buttocks Not examined   Right Leg, Foot, Toes Normal except as noted below in Assessment   Left Leg, Foot, Toes Normal except as noted below in Assessment       1  DERMATOSIS PAPULOSA NIGRA    Physical Exam:   Anatomic Location Affected: Face   Morphological Description:  Small 1-2 mm brown macules and papules   Pertinent Positives:   Pertinent Negatives: Additional History of Present Condition:  Found on exam today    Assessment and Plan:  Based on a thorough discussion of this condition and the management approach to it (including a comprehensive discussion of the known risks, side effects and potential benefits of treatment), the patient (family) agrees to implement the following specific plan:   Benign, assurance provided    2    MELANOCYTIC NEVI ("Moles")    Physical Exam:   Anatomic Location Affected:    Left neck    Morphological Description:  3 mm pink papule with regular pigment network   Pertinent Positives:   Pertinent Negatives: Additional History of Present Condition:  Patient reports she has had the spot for many years  Patient reports there have been no changes    Assessment and Plan:  Based on a thorough discussion of this condition and the management approach to it (including a comprehensive discussion of the known risks, side effects and potential benefits of treatment), the patient (family) agrees to implement the following specific plan:   When outside we recommend using a wide brim hat, sunglasses, long sleeve and pants, sunscreen with SPF 86+ with reapplication every 2 hours, or SPF specific clothing    Continue to monitor for any changes in size, shape and color  Melanocytic Nevi  Melanocytic nevi ("moles") are tan or brown, raised or flat areas of the skin which have an increased number of melanocytes  Melanocytes are the cells in our body which make pigment and account for skin color  Some moles are present at birth (I e , "congenital nevi"), while others come up later in life (i e , "acquired nevi")  The sun can stimulate the body to make more moles  Sunburns are not the only thing that triggers more moles  Chronic sun exposure can do it too  Clinically distinguishing a healthy mole from melanoma may be difficult, even for experienced dermatologists  The "ABCDE's" of moles have been suggested as a means of helping to alert a person to a suspicious mole and the possible increased risk of melanoma  The suggestions for raising alert are as follows:    Asymmetry: Healthy moles tend to be symmetric, while melanomas are often asymmetric  Asymmetry means if you draw a line through the mole, the two halves do not match in color, size, shape, or surface texture  Asymmetry can be a result of rapid enlargement of a mole, the development of a raised area on a previously flat lesion, scaling, ulceration, bleeding or scabbing within the mole    Any mole that starts to demonstrate "asymmetry" should be examined promptly by a board certified dermatologist      Border: Healthy moles tend to have discrete, even borders  The border of a melanoma often blends into the normal skin and does not sharply delineate the mole from normal skin  Any mole that starts to demonstrate "uneven borders" should be examined promptly by a board certified dermatologist      Color: Healthy moles tend to be one color throughout  Melanomas tend to be made up of different colors ranging from dark black, blue, white, or red  Any mole that demonstrates a color change should be examined promptly by a board certified dermatologist      Diameter: Healthy moles tend to be smaller than 0 6 cm in size; an exception are "congenital nevi" that can be larger  Melanomas tend to grow and can often be greater than 0 6 cm (1/4 of an inch, or the size of a pencil eraser)  This is only a guideline, and many normal moles may be larger than 0 6 cm without being unhealthy  Any mole that starts to change in size (small to bigger or bigger to smaller) should be examined promptly by a board certified dermatologist      Evolving: Healthy moles tend to "stay the same "  Melanomas may often show signs of change or evolution such as a change in size, shape, color, or elevation  Any mole that starts to itch, bleed, crust, burn, hurt, or ulcerate or demonstrate a change or evolution should be examined promptly by a board certified dermatologist         3   Daylin Small; NON-INFLAMED    Physical Exam:   Anatomic Location Affected:  Inframammary folds   Morphological Description:  Flat and raised, waxy, dark brown to blackish, discrete, "stuck-on" appearing papules   Pertinent Positives:   Pertinent Negatives: Additional History of Present Condition:  Patient reports new bumps on the skin  Denies itch, burn, pain, bleeding or ulceration  Present constantly; nothing seems to make it worse or better    No prior treatment  Assessment and Plan:  Based on a thorough discussion of this condition and the management approach to it (including a comprehensive discussion of the known risks, side effects and potential benefits of treatment), the patient (family) agrees to implement the following specific plan:   Benign, assurance provided  4   DERMATOFIBROMA    Physical Exam:   Anatomic Location Affected:  Left arm, left lower leg, right lower leg   Morphological Description:  Brown papules with "dimple" sign   Pertinent Positives:   Pertinent Negatives: Additional History of Present Condition:  Found on exam today  Assessment and Plan:  Based on a thorough discussion of this condition and the management approach to it (including a comprehensive discussion of the known risks, side effects and potential benefits of treatment), the patient (family) agrees to implement the following specific plan:   Benign, assurance provided  Discussed that removing dermatofibromas would cause a larger scar  Discussed that these are normal spots that are a result of some sort of trauma to the area previously      Monitor for changes        Scribe Attestation    I,:  Shilpa Hernández am acting as a scribe while in the presence of the attending physician :       I,:  Alina Patterson MD personally performed the services described in this documentation    as scribed in my presence :

## 2022-02-17 NOTE — TELEPHONE ENCOUNTER
Patricia Salguero came to her appt today, 2/17/22, 1pm   When I was checking her in she stated she should not be billed for this appt today, since she was here for an exam previously, on 2/7/22,  and all they did was take care of her nails   They told her at her 2/7/22 visit that they could not do an exam since they had no time and had to go to a meeting   She said she spoke with Mikel Remy about this   After speaking with Merna she advised me to send Lelo Leiva an e-mail so she can check with Billing on this to make sure she is not billed for both dates for an exam   Pt stated she already notified the insurance company and spoke with Mikel Remy previously   Pt said she was going to call and leave Mikel Remy a message as I told her she is out of the office until Tuesday   I did advise her I would be sending Lelo Leiva an e-mail regarding this situation       E-Mail sent

## 2022-03-14 LAB — FUNGUS SPEC CULT: NORMAL

## 2022-03-17 NOTE — TELEPHONE ENCOUNTER
Called Pt and LVM advising Alex Nieto is waiting to hear back from the billing office in regards to her 2/17/22 visit being zeroed out, Per Florence's email to me

## 2022-03-17 NOTE — TELEPHONE ENCOUNTER
Pt is calling in regards to a visit she had on 2/17/22, that she is receiving a bill for and she stated that Alana Soto told her she would not receive a bill  She has contacted the billing office and they stated there is nothing they can do and she needs to speak with Alana Soto  I sent an e-mail to Alana Soto to look into this and contact the Pt back

## 2022-04-21 ENCOUNTER — OFFICE VISIT (OUTPATIENT)
Dept: FAMILY MEDICINE CLINIC | Facility: CLINIC | Age: 57
End: 2022-04-21
Payer: COMMERCIAL

## 2022-04-21 VITALS
DIASTOLIC BLOOD PRESSURE: 60 MMHG | HEART RATE: 95 BPM | BODY MASS INDEX: 26.13 KG/M2 | OXYGEN SATURATION: 99 % | SYSTOLIC BLOOD PRESSURE: 120 MMHG | RESPIRATION RATE: 16 BRPM | WEIGHT: 129.6 LBS | HEIGHT: 59 IN | TEMPERATURE: 97.6 F

## 2022-04-21 DIAGNOSIS — J30.9 ALLERGIC RHINITIS, UNSPECIFIED SEASONALITY, UNSPECIFIED TRIGGER: ICD-10-CM

## 2022-04-21 DIAGNOSIS — K21.9 GASTROESOPHAGEAL REFLUX DISEASE WITHOUT ESOPHAGITIS: Primary | ICD-10-CM

## 2022-04-21 PROBLEM — B00.9 RECURRENT HERPES SIMPLEX: Chronic | Status: ACTIVE | Noted: 2022-04-21

## 2022-04-21 PROBLEM — L91.8 SKIN TAG: Status: RESOLVED | Noted: 2021-05-18 | Resolved: 2022-04-21

## 2022-04-21 PROCEDURE — 3725F SCREEN DEPRESSION PERFORMED: CPT | Performed by: FAMILY MEDICINE

## 2022-04-21 PROCEDURE — 99396 PREV VISIT EST AGE 40-64: CPT | Performed by: FAMILY MEDICINE

## 2022-04-21 PROCEDURE — 1036F TOBACCO NON-USER: CPT | Performed by: FAMILY MEDICINE

## 2022-04-21 PROCEDURE — 3008F BODY MASS INDEX DOCD: CPT | Performed by: FAMILY MEDICINE

## 2022-04-21 NOTE — PROGRESS NOTES
Assessment/Plan:    No problem-specific Assessment & Plan notes found for this encounter  Diagnoses and all orders for this visit:    Gastroesophageal reflux disease without esophagitis    Allergic rhinitis, unspecified seasonality, unspecified trigger          Subjective:      Patient ID: Shanta Rahman is a 62 y o  female  Wellness    Colon /pap/ mammo OK     Goes to gym ; The following portions of the patient's history were reviewed and updated as appropriate: allergies, current medications, past family history, past medical history, past social history, past surgical history and problem list     Review of Systems   Constitutional: Negative for activity change and appetite change  HENT: Negative for voice change  Eyes: Negative for visual disturbance  Respiratory: Negative for chest tightness and shortness of breath  Cardiovascular: Negative for chest pain, palpitations and leg swelling  Gastrointestinal: Negative for abdominal pain, blood in stool, constipation and diarrhea  Genitourinary: Negative for dysuria, vaginal bleeding and vaginal discharge  Skin: Negative for rash  Neurological: Negative for dizziness  Psychiatric/Behavioral: Negative for dysphoric mood  Objective:  Vitals:    04/21/22 1003   BP: 120/60   BP Location: Left arm   Patient Position: Sitting   Cuff Size: Adult   Pulse: 95   Resp: 16   Temp: 97 6 °F (36 4 °C)   TempSrc: Tympanic   SpO2: 99%   Weight: 58 8 kg (129 lb 9 6 oz)   Height: 4' 11" (1 499 m)      Physical Exam  Constitutional:       Appearance: She is well-developed  HENT:      Head: Normocephalic and atraumatic  Eyes:      Conjunctiva/sclera: Conjunctivae normal    Neck:      Thyroid: No thyromegaly  Cardiovascular:      Rate and Rhythm: Normal rate and regular rhythm  Heart sounds: Normal heart sounds  No murmur heard  Pulmonary:      Effort: Pulmonary effort is normal  No respiratory distress        Breath sounds: Normal breath sounds  Musculoskeletal:      Cervical back: Neck supple  Lymphadenopathy:      Cervical: No cervical adenopathy  Skin:     General: Skin is warm and dry  Psychiatric:         Behavior: Behavior normal            Patient's chronic problems that were reviewed today are stable  Recent hospital stays reviewed  Recent labs and imaging reviewed  Recent visits to other providers reviewed  Meds reviewed and no changes made  Appropriate labs and imaging were ordered  Preventive measures appropriate for age and sex were reviewed with patient  Immunizations were updated as appropriate

## 2022-05-25 ENCOUNTER — TELEPHONE (OUTPATIENT)
Dept: GYNECOLOGY | Facility: CLINIC | Age: 57
End: 2022-05-25

## 2022-05-25 DIAGNOSIS — N95.1 HOT FLASHES DUE TO MENOPAUSE: ICD-10-CM

## 2022-05-25 RX ORDER — ESTRADIOL/NORETHINDRONE ACETATE TRANSDERMAL SYSTEM .05; .14 MG/D; MG/D
1 PATCH, EXTENDED RELEASE TRANSDERMAL 2 TIMES WEEKLY
Qty: 24 PATCH | Refills: 3 | Status: SHIPPED | OUTPATIENT
Start: 2022-05-26

## 2022-05-31 ENCOUNTER — ANNUAL EXAM (OUTPATIENT)
Dept: GYNECOLOGY | Facility: CLINIC | Age: 57
End: 2022-05-31
Payer: COMMERCIAL

## 2022-05-31 VITALS
BODY MASS INDEX: 25.8 KG/M2 | HEART RATE: 96 BPM | WEIGHT: 128 LBS | DIASTOLIC BLOOD PRESSURE: 60 MMHG | HEIGHT: 59 IN | SYSTOLIC BLOOD PRESSURE: 118 MMHG

## 2022-05-31 DIAGNOSIS — Z01.419 ENCOUNTER FOR GYNECOLOGICAL EXAMINATION WITH PAPANICOLAOU SMEAR OF CERVIX: Primary | ICD-10-CM

## 2022-05-31 DIAGNOSIS — D21.9 FIBROIDS: ICD-10-CM

## 2022-05-31 DIAGNOSIS — Z01.419 ENCOUNTER FOR GYNECOLOGICAL EXAMINATION WITHOUT ABNORMAL FINDING: ICD-10-CM

## 2022-05-31 DIAGNOSIS — Z13.820 SCREENING FOR OSTEOPOROSIS: ICD-10-CM

## 2022-05-31 DIAGNOSIS — Z12.31 ENCOUNTER FOR SCREENING MAMMOGRAM FOR MALIGNANT NEOPLASM OF BREAST: ICD-10-CM

## 2022-05-31 DIAGNOSIS — Z79.890 HORMONE REPLACEMENT THERAPY (HRT): ICD-10-CM

## 2022-05-31 PROCEDURE — 76977 US BONE DENSITY MEASURE: CPT | Performed by: OBSTETRICS & GYNECOLOGY

## 2022-05-31 PROCEDURE — S0612 ANNUAL GYNECOLOGICAL EXAMINA: HCPCS | Performed by: OBSTETRICS & GYNECOLOGY

## 2022-05-31 PROCEDURE — 3008F BODY MASS INDEX DOCD: CPT | Performed by: FAMILY MEDICINE

## 2022-05-31 PROCEDURE — G0145 SCR C/V CYTO,THINLAYER,RESCR: HCPCS | Performed by: OBSTETRICS & GYNECOLOGY

## 2022-05-31 NOTE — PROGRESS NOTES
Assessment/Plan:         Diagnoses and all orders for this visit:    Encounter for screening mammogram for malignant neoplasm of breast    Encounter for gynecological examination without abnormal finding    Fibroids:stable    Hormone replacement therapy (HRT): continue    Screening for osteoporosis: heel scan:  9        Subjective:      Patient ID: Lars Sanchez is a 62 y o  female  HPI  Presents for annual exam On HRT x 2 years and is doing well on this and would like to continue  Known fibroid uterus  No urinary or GI complaints  Colonoscopy age 48 normal  Repeat at age 61  Heel scan: 2020    The following portions of the patient's history were reviewed and updated as appropriate:   She  has a past medical history of Fibroids (1994)  She   Patient Active Problem List    Diagnosis Date Noted    Recurrent herpes simplex 04/21/2022    Fungal nail infection 05/18/2021    Difficulty swallowing 11/07/2017    Dysphagia 11/07/2017    Allergic rhinitis 10/12/2017    Uterine leiomyoma 03/22/2016    Backache 01/29/2016    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    Gastroesophageal reflux disease 09/18/2015    Epidermoid cyst of skin 06/03/2015    Anisocoria 11/08/2011     She  has a past surgical history that includes Myomectomy; [place] tooth extraction; Esophagogastroduodenoscopy; and Other surgical history  Her family history includes Heart attack in her family; Hypertension in her mother; Kidney failure in her maternal uncle; Lung cancer (age of onset: 72) in her father; Lymphoma in her maternal grandmother; No Known Problems in her maternal aunt, maternal aunt, maternal aunt, maternal aunt, paternal aunt, paternal aunt, paternal aunt, and sister; Stroke in her family  She  reports that she has never smoked  She has never used smokeless tobacco  She reports current alcohol use  She reports that she does not use drugs    Current Outpatient Medications   Medication Sig Dispense Refill    Ascorbic Acid (RALPH-C PO) Take by mouth        cholecalciferol (VITAMIN D3) 1,000 units tablet Take 1,000 Units by mouth daily       estradiol-norethindrone (COMBIPATCH) 0 05-0 14 MG/DAY Place 1 patch on the skin 2 (two) times a week 24 patch 3    hydrocortisone 2 5 % cream Apply topically 3 (three) times a day 15 g 2    loratadine-pseudoephedrine (CLARITIN-D 12-HOUR) 5-120 mg per tablet Take 1 tablet by mouth 2 (two) times a day 180 tablet 6    Omega-3 Fatty Acids (fish oil) 1,000 mg Take by mouth      estradiol (ESTRACE) 0 1 mg/g vaginal cream 1 gram twice week x 4 weeks then once weekly (Patient not taking: No sig reported) 42 5 g 3    estradiol (ESTRACE) 0 1 mg/g vaginal cream Insert 1 g into the vagina once a week (Patient not taking: No sig reported) 42 5 g 3    estradiol-norethindrone (CombiPatch) 0 05-0 14 MG/DAY Place 1 patch on the skin 2 (two) times a week (Patient not taking: Reported on 5/31/2022) 24 patch 3    KRILL OIL PO Take by mouth (Patient not taking: Reported on 5/31/2022)      Multiple Vitamins-Minerals (MULTIVITAMIN ADULT PO) every 24 hours      valACYclovir (VALTREX) 1,000 mg tablet Take 1 tablet (1,000 mg total) by mouth 2 (two) times a day 30 tablet 6     No current facility-administered medications for this visit       Current Outpatient Medications on File Prior to Visit   Medication Sig    Ascorbic Acid (RALPH-C PO) Take by mouth      cholecalciferol (VITAMIN D3) 1,000 units tablet Take 1,000 Units by mouth daily     estradiol-norethindrone (COMBIPATCH) 0 05-0 14 MG/DAY Place 1 patch on the skin 2 (two) times a week    hydrocortisone 2 5 % cream Apply topically 3 (three) times a day    loratadine-pseudoephedrine (CLARITIN-D 12-HOUR) 5-120 mg per tablet Take 1 tablet by mouth 2 (two) times a day    Omega-3 Fatty Acids (fish oil) 1,000 mg Take by mouth    estradiol (ESTRACE) 0 1 mg/g vaginal cream 1 gram twice week x 4 weeks then once weekly (Patient not taking: No sig reported)    estradiol (ESTRACE) 0 1 mg/g vaginal cream Insert 1 g into the vagina once a week (Patient not taking: No sig reported)    estradiol-norethindrone (CombiPatch) 0 05-0 14 MG/DAY Place 1 patch on the skin 2 (two) times a week (Patient not taking: Reported on 5/31/2022)    KRILL OIL PO Take by mouth (Patient not taking: Reported on 5/31/2022)    Multiple Vitamins-Minerals (MULTIVITAMIN ADULT PO) every 24 hours    valACYclovir (VALTREX) 1,000 mg tablet Take 1 tablet (1,000 mg total) by mouth 2 (two) times a day     No current facility-administered medications on file prior to visit  She is allergic to cefuroxime, cetirizine, fexofenadine, penicillins, sulfa antibiotics, and sulfanilamide       Review of Systems   Constitutional: Negative  HENT: Negative for sore throat and trouble swallowing  Gastrointestinal: Negative  Genitourinary: Negative  Objective:      /60   Pulse 96   Ht 4' 11" (1 499 m)   Wt 58 1 kg (128 lb)   LMP 07/03/2017   BMI 25 85 kg/m²          Physical Exam  Vitals reviewed  Constitutional:       Appearance: Normal appearance  She is normal weight  Cardiovascular:      Rate and Rhythm: Normal rate and regular rhythm  Pulses: Normal pulses  Heart sounds: Normal heart sounds  No murmur heard  Pulmonary:      Effort: Pulmonary effort is normal  No respiratory distress  Breath sounds: Normal breath sounds  Chest:   Breasts:      Right: No swelling, bleeding, inverted nipple, mass, nipple discharge, skin change, tenderness, axillary adenopathy or supraclavicular adenopathy  Left: No swelling, bleeding, inverted nipple, mass, nipple discharge, skin change, tenderness, axillary adenopathy or supraclavicular adenopathy  Abdominal:      General: There is no distension  Palpations: Abdomen is soft  There is no mass  Tenderness: There is no abdominal tenderness  There is no guarding or rebound  Hernia: No hernia is present   There is no hernia in the left inguinal area or right inguinal area  Genitourinary:     General: Normal vulva  Labia:         Right: No rash, tenderness or lesion  Left: No rash, tenderness or lesion  Vagina: Normal       Cervix: Normal       Uterus: Enlarged (12wk size; stable)  Not fixed, not tender and no uterine prolapse  Adnexa:         Right: No mass, tenderness or fullness  Left: No mass, tenderness or fullness  Musculoskeletal:      Cervical back: Normal range of motion and neck supple  No tenderness  Lymphadenopathy:      Cervical: No cervical adenopathy  Upper Body:      Right upper body: No supraclavicular, axillary or pectoral adenopathy  Left upper body: No supraclavicular, axillary or pectoral adenopathy  Lower Body: No right inguinal adenopathy  No left inguinal adenopathy  Neurological:      Mental Status: She is alert

## 2022-06-09 LAB
LAB AP GYN PRIMARY INTERPRETATION: NORMAL
Lab: NORMAL

## 2022-06-28 ENCOUNTER — HOSPITAL ENCOUNTER (OUTPATIENT)
Dept: MAMMOGRAPHY | Facility: MEDICAL CENTER | Age: 57
Discharge: HOME | End: 2022-06-28
Payer: COMMERCIAL

## 2022-06-28 VITALS — BODY MASS INDEX: 25.82 KG/M2 | WEIGHT: 128.09 LBS | HEIGHT: 59 IN

## 2022-06-28 DIAGNOSIS — Z12.31 ENCOUNTER FOR SCREENING MAMMOGRAM FOR MALIGNANT NEOPLASM OF BREAST: ICD-10-CM

## 2022-06-28 PROCEDURE — 77063 BREAST TOMOSYNTHESIS BI: CPT

## 2022-06-28 PROCEDURE — 77067 SCR MAMMO BI INCL CAD: CPT

## 2022-07-06 ENCOUNTER — RA CDI HCC (OUTPATIENT)
Dept: OTHER | Facility: HOSPITAL | Age: 57
End: 2022-07-06

## 2022-07-13 ENCOUNTER — OFFICE VISIT (OUTPATIENT)
Dept: FAMILY MEDICINE CLINIC | Facility: CLINIC | Age: 57
End: 2022-07-13
Payer: COMMERCIAL

## 2022-07-13 VITALS
DIASTOLIC BLOOD PRESSURE: 82 MMHG | HEART RATE: 99 BPM | OXYGEN SATURATION: 97 % | WEIGHT: 131.4 LBS | TEMPERATURE: 97.8 F | BODY MASS INDEX: 26.49 KG/M2 | HEIGHT: 59 IN | SYSTOLIC BLOOD PRESSURE: 126 MMHG

## 2022-07-13 DIAGNOSIS — L73.9 FOLLICULITIS OF NOSE: Primary | ICD-10-CM

## 2022-07-13 PROCEDURE — 99213 OFFICE O/P EST LOW 20 MIN: CPT | Performed by: FAMILY MEDICINE

## 2022-07-13 NOTE — ASSESSMENT & PLAN NOTE
New  · Ongoing for the past month  · noninflamed folliculitis of hair follicle in right nares  · Appear ready to drain  · Encouraged patient to use warm compress until ruptured and start topical neomycin   · Follow up prn

## 2022-07-13 NOTE — PROGRESS NOTES
Assessment/Plan:      1  Folliculitis of nose  Assessment & Plan:  New  Ongoing for the past month  noninflamed folliculitis of hair follicle in right nares  Appear ready to drain  Encouraged patient to use warm compress until ruptured and start topical neomycin   Follow up prn              Subjective:      Patient ID: Akosua Wallace is a 62 y o  female presents today for evaluation of right nostril lesion  She noticed it after a wedding on 6/18/2022  Initially it was inflamed but then the symptoms resolved and the lesion remains  She has history of seasonal allergies and takes claritin D  Denies using any nasal spray  Tried using neosporin and vaseline without improvements  HPI    The following portions of the patient's history were reviewed and updated as appropriate: allergies, current medications, past family history, past medical history, past social history, past surgical history and problem list     Review of Systems   Constitutional: Negative for activity change, chills, diaphoresis and fever  HENT: Positive for rhinorrhea and sneezing  Negative for ear pain, hearing loss, mouth sores, nosebleeds, postnasal drip, sinus pressure, sinus pain and sore throat  Nose lump   Respiratory: Negative for cough, chest tightness, shortness of breath and wheezing  Cardiovascular: Negative for chest pain, palpitations and leg swelling  Gastrointestinal: Negative for abdominal pain, blood in stool, constipation, diarrhea, nausea and vomiting  Genitourinary: Negative for dysuria, frequency, hematuria and urgency  Musculoskeletal: Negative for arthralgias and myalgias  Neurological: Negative for dizziness, syncope, weakness, light-headedness, numbness and headaches           Objective:      /82 (BP Location: Left arm, Patient Position: Sitting, Cuff Size: Adult)   Pulse 99   Temp 97 8 °F (36 6 °C)   Ht 4' 11" (1 499 m)   Wt 59 6 kg (131 lb 6 4 oz)   LMP 07/03/2017   SpO2 97%   BMI 26 54 kg/m²          Physical Exam  Vitals reviewed  Constitutional:       General: She is not in acute distress  Appearance: She is well-developed  She is not diaphoretic  HENT:      Head: Normocephalic and atraumatic  Right Ear: External ear normal       Left Ear: External ear normal       Nose: Nose normal  No congestion  Right Nostril: No foreign body, epistaxis, septal hematoma or occlusion  Left Nostril: No foreign body, epistaxis, septal hematoma or occlusion  Right Turbinates: Enlarged  Not swollen or pale  Left Turbinates: Enlarged  Not swollen or pale  Mouth/Throat:      Mouth: Mucous membranes are moist       Pharynx: Oropharynx is clear  No oropharyngeal exudate  Eyes:      General: No scleral icterus  Right eye: No discharge  Left eye: No discharge  Conjunctiva/sclera: Conjunctivae normal       Pupils: Pupils are equal, round, and reactive to light  Neck:      Thyroid: No thyromegaly  Vascular: No JVD  Trachea: No tracheal deviation  Cardiovascular:      Rate and Rhythm: Normal rate and regular rhythm  Heart sounds: Normal heart sounds  No murmur heard  No friction rub  No gallop  Pulmonary:      Effort: Pulmonary effort is normal  No respiratory distress  Breath sounds: Normal breath sounds  No wheezing or rales  Chest:      Chest wall: No tenderness  Abdominal:      General: Bowel sounds are normal  There is no distension  Palpations: Abdomen is soft  Tenderness: There is no abdominal tenderness  There is no right CVA tenderness, left CVA tenderness, guarding or rebound  Musculoskeletal:         General: Normal range of motion  Cervical back: Normal range of motion and neck supple  No tenderness  Right lower leg: No edema  Left lower leg: No edema  Lymphadenopathy:      Cervical: No cervical adenopathy  Skin:     General: Skin is warm and dry     Neurological:      Mental Status: She is alert and oriented to person, place, and time  Mental status is at baseline  Psychiatric:         Mood and Affect: Mood normal          Behavior: Behavior normal          Thought Content:  Thought content normal          Judgment: Judgment normal

## 2022-07-26 ENCOUNTER — TELEPHONE (OUTPATIENT)
Dept: DERMATOLOGY | Facility: CLINIC | Age: 57
End: 2022-07-26

## 2022-07-26 NOTE — TELEPHONE ENCOUNTER
----- Message from Edvin Evangelista MD sent at 2/9/2022  2:31 PM EST -----  Surgical Pathology Report                         Case: [id number]                                   Authorizing Provider: Edvin Evangelista MD      Collected:           02/07/2022 1403              Ordering Location:     [facility]      Received:            02/07/2022 1403                                     [place]                                                                       Pathologist:           Mark Galvez MD                                                           Specimen:    Nail, Specimen A: left 3rd fingernail                                                    Final Diagnosis  A  Nail, left 3rd fingernail :     Pathogenic microorganisms are not seen on PAS stain  Electronically signed by Mark Galvez MD on 2/9/2022 at  2:18 PM    DERMATOPATHOLOGY RESULT NOTE    Results reviewed by ordering physician    Sent my chart message with results      Instructions for Clinical Derm Team:   (remember to route Result Note to appropriate staff):    Call patient and schedule for 6 months follow up    Result & Plan by Specimen:    Specimen A: nail clipping  Plan: PAS stain negative

## 2022-07-27 ENCOUNTER — TELEPHONE (OUTPATIENT)
Dept: DERMATOLOGY | Facility: CLINIC | Age: 57
End: 2022-07-27

## 2022-07-27 NOTE — TELEPHONE ENCOUNTER
Pt states will only come back for annual skin check as there were no issues with her fingernails; informed pt to cb at end of Nov/Dec for possible Feb apt with Dr Mark Sanderson in [place]

## 2022-07-27 NOTE — TELEPHONE ENCOUNTER
Pt returned call, informed me that she only wanted to see Dr Shabnam Fowler and in [place] if possible as its closer for her  Informed pt to cb end of Nov/Dec for next yr schedule in Feb and possibly Dr Faizan Marrero will have schedule at that time    Pt verbally agreed to cb if nobody contacts her first

## 2022-07-27 NOTE — TELEPHONE ENCOUNTER
----- Message from Teri Stone MD sent at 2/9/2022  2:31 PM EST -----  Surgical Pathology Report                         Case: [id number]                                   Authorizing Provider: Teri Stone MD      Collected:           02/07/2022 1403              Ordering Location:     [facility]      Received:            02/07/2022 1403                                     [place]                                                                       Pathologist:           Saleem Cintron MD                                                           Specimen:    Nail, Specimen A: left 3rd fingernail                                                    Final Diagnosis  A  Nail, left 3rd fingernail :     Pathogenic microorganisms are not seen on PAS stain  Electronically signed by Saleem Cintron MD on 2/9/2022 at  2:18 PM    DERMATOPATHOLOGY RESULT NOTE    Results reviewed by ordering physician    Sent my chart message with results      Instructions for Clinical Derm Team:   (remember to route Result Note to appropriate staff):    Call patient and schedule for 6 months follow up    Result & Plan by Specimen:    Specimen A: nail clipping  Plan: PAS stain negative

## 2022-07-28 ENCOUNTER — TELEPHONE (OUTPATIENT)
Dept: DERMATOLOGY | Facility: CLINIC | Age: 57
End: 2022-07-28

## 2022-09-04 ENCOUNTER — TRANSCRIBE ORDERS (OUTPATIENT)
Dept: FAMILY MEDICINE CLINIC | Facility: CLINIC | Age: 57
End: 2022-09-04

## 2022-09-04 DIAGNOSIS — K21.9 GASTROESOPHAGEAL REFLUX DISEASE WITHOUT ESOPHAGITIS: ICD-10-CM

## 2022-09-04 RX ORDER — OMEPRAZOLE 40 MG/1
40 CAPSULE, DELAYED RELEASE ORAL
Qty: 30 CAPSULE | Refills: 12 | Status: SHIPPED | OUTPATIENT
Start: 2022-09-04 | End: 2022-09-28

## 2022-09-22 DIAGNOSIS — J30.9 ALLERGIC RHINITIS, UNSPECIFIED SEASONALITY, UNSPECIFIED TRIGGER: ICD-10-CM

## 2022-09-22 RX ORDER — LORATADINE, PSEUDOEPHEDRINE 5; 120 MG/1; MG/1
TABLET, EXTENDED RELEASE ORAL
Qty: 180 TABLET | Refills: 0 | Status: SHIPPED | OUTPATIENT
Start: 2022-09-22

## 2022-09-23 ENCOUNTER — TELEPHONE (OUTPATIENT)
Dept: FAMILY MEDICINE CLINIC | Facility: CLINIC | Age: 57
End: 2022-09-23

## 2022-09-26 ENCOUNTER — TRANSCRIBE ORDERS (OUTPATIENT)
Dept: FAMILY MEDICINE CLINIC | Facility: CLINIC | Age: 57
End: 2022-09-26

## 2022-09-26 DIAGNOSIS — J30.9 ALLERGIC RHINITIS, UNSPECIFIED SEASONALITY, UNSPECIFIED TRIGGER: Primary | ICD-10-CM

## 2022-09-27 DIAGNOSIS — K21.9 GASTROESOPHAGEAL REFLUX DISEASE WITHOUT ESOPHAGITIS: ICD-10-CM

## 2022-09-28 RX ORDER — OMEPRAZOLE 40 MG/1
CAPSULE, DELAYED RELEASE ORAL
Qty: 90 CAPSULE | Refills: 5 | Status: SHIPPED | OUTPATIENT
Start: 2022-09-28

## 2022-10-12 ENCOUNTER — OFFICE VISIT (OUTPATIENT)
Dept: FAMILY MEDICINE CLINIC | Facility: CLINIC | Age: 57
End: 2022-10-12
Payer: COMMERCIAL

## 2022-10-12 VITALS
WEIGHT: 134.8 LBS | HEART RATE: 82 BPM | DIASTOLIC BLOOD PRESSURE: 70 MMHG | BODY MASS INDEX: 27.23 KG/M2 | OXYGEN SATURATION: 98 % | SYSTOLIC BLOOD PRESSURE: 140 MMHG | RESPIRATION RATE: 18 BRPM | TEMPERATURE: 98.7 F

## 2022-10-12 DIAGNOSIS — Z13.1 SCREENING FOR DIABETES MELLITUS: ICD-10-CM

## 2022-10-12 DIAGNOSIS — Z13.6 SCREENING FOR CARDIOVASCULAR CONDITION: ICD-10-CM

## 2022-10-12 DIAGNOSIS — R07.89 OTHER CHEST PAIN: Primary | ICD-10-CM

## 2022-10-12 PROCEDURE — 93000 ELECTROCARDIOGRAM COMPLETE: CPT | Performed by: FAMILY MEDICINE

## 2022-10-12 PROCEDURE — 99214 OFFICE O/P EST MOD 30 MIN: CPT | Performed by: FAMILY MEDICINE

## 2022-10-12 NOTE — PROGRESS NOTES
BMI Counseling: Body mass index is 27 23 kg/m²  The BMI is above normal  Nutrition recommendations include decreasing portion sizes, encouraging healthy choices of fruits and vegetables, decreasing fast food intake, limiting drinks that contain sugar, moderation in carbohydrate intake, reducing intake of saturated and trans fat and reducing intake of cholesterol  Exercise recommendations include moderate physical activity 150 minutes/week and exercising 3-5 times per week  Rationale for BMI follow-up plan is due to patient being overweight or obese  Depression Screening and Follow-up Plan: Patient was screened for depression during today's encounter  They screened negative with a PHQ-2 score of 0  Assessment/Plan:      1  Other chest pain  Assessment & Plan:  New  Intermittent  Not exacerbated with exertion  Patient's father had a stent placed no further details  Patient is at low risk for heart disease  POCT ECG is normal today  History of nuclear stress test due to job pain which was deemed secondary to TMJ  More likely musculoskeletal in the setting of exercising but may also be due to stress from job  Discussed ED precautions  Follow-up as needed    Orders:  -     CBC and differential; Future  -     POCT ECG    2  Screening for diabetes mellitus  -     HEMOGLOBIN A1C W/ EAG ESTIMATION; Future  -     Comprehensive metabolic panel; Future    3  Screening for cardiovascular condition  -     Lipid panel; Future            Subjective:      Patient ID: More Hoover is a 62 y o  female presents today for chest pain evaluation  Noticed pain about 1 month ago  Comes and goes  Is able to go to the gym without pain  She also admits to worsening acid reflux  She takes omeprazole which helped  The chest pains occurs randomly and lasts for minutes  Denies shortness of breath       HPI    The following portions of the patient's history were reviewed and updated as appropriate: allergies, current medications, past family history, past medical history, past social history, past surgical history and problem list     Review of Systems   Constitutional: Negative for activity change, chills, diaphoresis and fever  HENT: Negative for ear pain, hearing loss, postnasal drip, rhinorrhea, sinus pressure, sinus pain, sneezing and sore throat  Respiratory: Negative for cough, chest tightness, shortness of breath and wheezing  Cardiovascular: Positive for chest pain  Negative for palpitations and leg swelling  Gastrointestinal: Negative for abdominal pain, blood in stool, constipation, diarrhea, nausea and vomiting  Genitourinary: Negative for dysuria, frequency, hematuria and urgency  Musculoskeletal: Negative for arthralgias and myalgias  Neurological: Negative for dizziness, syncope, weakness, light-headedness, numbness and headaches  Objective:      /70 (BP Location: Left arm, Patient Position: Sitting, Cuff Size: Standard)   Pulse 82   Temp 98 7 °F (37 1 °C) (Temporal)   Resp 18   Wt 61 1 kg (134 lb 12 8 oz)   LMP 07/03/2017   SpO2 98%   BMI 27 23 kg/m²          Physical Exam  Vitals reviewed  Constitutional:       General: She is not in acute distress  Appearance: She is well-developed  She is not diaphoretic  HENT:      Head: Normocephalic and atraumatic  Right Ear: External ear normal       Left Ear: External ear normal       Nose: Nose normal  No congestion  Mouth/Throat:      Mouth: Mucous membranes are moist       Pharynx: Oropharynx is clear  No oropharyngeal exudate  Eyes:      General: No scleral icterus  Right eye: No discharge  Left eye: No discharge  Conjunctiva/sclera: Conjunctivae normal       Pupils: Pupils are equal, round, and reactive to light  Neck:      Thyroid: No thyromegaly  Vascular: No JVD  Trachea: No tracheal deviation  Cardiovascular:      Rate and Rhythm: Normal rate and regular rhythm        Heart sounds: Normal heart sounds  No murmur heard  No friction rub  No gallop  Pulmonary:      Effort: Pulmonary effort is normal  No respiratory distress  Breath sounds: Normal breath sounds  No decreased breath sounds, wheezing, rhonchi or rales  Chest:      Chest wall: No tenderness  Abdominal:      General: Bowel sounds are normal  There is no distension  Palpations: Abdomen is soft  Tenderness: There is no abdominal tenderness  There is no right CVA tenderness, left CVA tenderness, guarding or rebound  Musculoskeletal:         General: Normal range of motion  Cervical back: Normal range of motion and neck supple  No tenderness  Right lower leg: No edema  Left lower leg: No edema  Lymphadenopathy:      Cervical: No cervical adenopathy  Skin:     General: Skin is warm and dry  Neurological:      Mental Status: She is alert and oriented to person, place, and time  Mental status is at baseline  Psychiatric:         Mood and Affect: Mood normal          Behavior: Behavior normal          Thought Content:  Thought content normal          Judgment: Judgment normal

## 2022-10-12 NOTE — ASSESSMENT & PLAN NOTE
New  · Intermittent  · Not exacerbated with exertion  · Patient's father had a stent placed no further details  · Patient is at low risk for heart disease  · POCT ECG is normal today  · History of nuclear stress test due to job pain which was deemed secondary to TMJ  · More likely musculoskeletal in the setting of exercising but may also be due to stress from job    · Discussed ED precautions  · Follow-up as needed

## 2022-10-16 LAB
ALBUMIN SERPL-MCNC: 4 G/DL (ref 3.6–5.1)
ALBUMIN/GLOB SERPL: 1.3 (CALC) (ref 1–2.5)
ALP SERPL-CCNC: 52 U/L (ref 37–153)
ALT SERPL-CCNC: 13 U/L (ref 6–29)
AST SERPL-CCNC: 17 U/L (ref 10–35)
BASOPHILS # BLD AUTO: 28 CELLS/UL (ref 0–200)
BASOPHILS NFR BLD AUTO: 0.4 %
BILIRUB SERPL-MCNC: 1 MG/DL (ref 0.2–1.2)
BUN SERPL-MCNC: 16 MG/DL (ref 7–25)
BUN/CREAT SERPL: NORMAL (CALC) (ref 6–22)
CALCIUM SERPL-MCNC: 9.4 MG/DL (ref 8.6–10.4)
CHLORIDE SERPL-SCNC: 102 MMOL/L (ref 98–110)
CHOLEST SERPL-MCNC: 176 MG/DL
CHOLEST/HDLC SERPL: 2.8 (CALC)
CO2 SERPL-SCNC: 32 MMOL/L (ref 20–32)
CREAT SERPL-MCNC: 0.9 MG/DL (ref 0.5–1.03)
EOSINOPHIL # BLD AUTO: 119 CELLS/UL (ref 15–500)
EOSINOPHIL NFR BLD AUTO: 1.7 %
ERYTHROCYTE [DISTWIDTH] IN BLOOD BY AUTOMATED COUNT: 12.2 % (ref 11–15)
EST. AVERAGE GLUCOSE BLD GHB EST-MCNC: 97 MG/DL
EST. AVERAGE GLUCOSE BLD GHB EST-SCNC: 5.4 MMOL/L
GFR/BSA.PRED SERPLBLD CYS-BASED-ARV: 75 ML/MIN/1.73M2
GLOBULIN SER CALC-MCNC: 3 G/DL (CALC) (ref 1.9–3.7)
GLUCOSE SERPL-MCNC: 71 MG/DL (ref 65–99)
HBA1C MFR BLD: 5 % OF TOTAL HGB
HCT VFR BLD AUTO: 39.2 % (ref 35–45)
HDLC SERPL-MCNC: 63 MG/DL
HGB BLD-MCNC: 13.6 G/DL (ref 11.7–15.5)
LDLC SERPL CALC-MCNC: 98 MG/DL (CALC)
LYMPHOCYTES # BLD AUTO: 1554 CELLS/UL (ref 850–3900)
LYMPHOCYTES NFR BLD AUTO: 22.2 %
MCH RBC QN AUTO: 32.4 PG (ref 27–33)
MCHC RBC AUTO-ENTMCNC: 34.7 G/DL (ref 32–36)
MCV RBC AUTO: 93.3 FL (ref 80–100)
MONOCYTES # BLD AUTO: 399 CELLS/UL (ref 200–950)
MONOCYTES NFR BLD AUTO: 5.7 %
NEUTROPHILS # BLD AUTO: 4900 CELLS/UL (ref 1500–7800)
NEUTROPHILS NFR BLD AUTO: 70 %
NONHDLC SERPL-MCNC: 113 MG/DL (CALC)
PLATELET # BLD AUTO: 325 THOUSAND/UL (ref 140–400)
PMV BLD REES-ECKER: 8.5 FL (ref 7.5–12.5)
POTASSIUM SERPL-SCNC: 3.9 MMOL/L (ref 3.5–5.3)
PROT SERPL-MCNC: 7 G/DL (ref 6.1–8.1)
RBC # BLD AUTO: 4.2 MILLION/UL (ref 3.8–5.1)
SODIUM SERPL-SCNC: 140 MMOL/L (ref 135–146)
TRIGL SERPL-MCNC: 60 MG/DL
WBC # BLD AUTO: 7 THOUSAND/UL (ref 3.8–10.8)

## 2022-10-19 ENCOUNTER — TELEPHONE (OUTPATIENT)
Dept: FAMILY MEDICINE CLINIC | Facility: CLINIC | Age: 57
End: 2022-10-19

## 2022-10-19 NOTE — TELEPHONE ENCOUNTER
Patient would like to know her lab results  She also said she is concern with her CO2 and also her MCH, being high   Please advise

## 2023-02-07 ENCOUNTER — OFFICE VISIT (OUTPATIENT)
Dept: DERMATOLOGY | Facility: CLINIC | Age: 58
End: 2023-02-07

## 2023-02-07 VITALS — WEIGHT: 139.9 LBS | HEIGHT: 60 IN | TEMPERATURE: 98.2 F | BODY MASS INDEX: 27.47 KG/M2

## 2023-02-07 DIAGNOSIS — L30.9 HAND DERMATITIS: ICD-10-CM

## 2023-02-07 DIAGNOSIS — L60.8 MELANONYCHIA STRIATA: ICD-10-CM

## 2023-02-07 DIAGNOSIS — D22.9 MULTIPLE BENIGN MELANOCYTIC NEVI: ICD-10-CM

## 2023-02-07 DIAGNOSIS — D23.9 DERMATOFIBROMA: Primary | ICD-10-CM

## 2023-02-07 RX ORDER — TRIAMCINOLONE ACETONIDE 1 MG/G
CREAM TOPICAL
Qty: 80 G | Refills: 2 | Status: SHIPPED | OUTPATIENT
Start: 2023-02-07

## 2023-02-07 NOTE — PROGRESS NOTES
\[facility] [place] [facility]\""[place] [address] [facility]     Patient Name: Stiven Barragan  Encounter Date: 02/07/2023    Have you been cared for by a [place] [address] Dermatologist in the last 3 years and, if so, which description applies to you? Yes  I have been here within the last 3 years, and my medical history has NOT changed since that time  I am FEMALE/of child-bearing potential     REVIEW OF SYSTEMS:  Have you recently had or currently have any of the following? · No changes in my recent health  PAST MEDICAL HISTORY:  Have you personally ever had or currently have any of the following? If "YES," then please provide more detail  · No changes in my medical history  FAMILY HISTORY:  Any "first degree relatives" (parent, brother, sister, or child) with the following? • No changes in my family's known health  PATIENT EXPERIENCE:    • Do you want the Dermatologist to perform a COMPLETE skin exam today including a clinical examination under the "bra and underwear" areas? Yes  • If necessary, do we have your permission to call and leave a detailed message on your Preferred Phone number that includes your specific medical information?   Yes      Allergies   Allergen Reactions   • Cefuroxime      Other reaction(s): Hives   • Cetirizine      Other reaction(s): sleepy  Other reaction(s): sleepy   • Fexofenadine      Other reaction(s): palpitations  Other reaction(s): palpitations   • Penicillins    • Sulfa Antibiotics    • Sulfanilamide      Other reaction(s): Hives      Current Outpatient Medications:   •  Ascorbic Acid (RALPH-C PO), Take by mouth  , Disp: , Rfl:   •  cholecalciferol (VITAMIN D3) 1,000 units tablet, Take 1,000 Units by mouth daily , Disp: , Rfl:   •  estradiol-norethindrone (COMBIPATCH) 0 05-0 14 MG/DAY, Place 1 patch on the skin 2 (two) times a week, Disp: 24 patch, Rfl: 3  •  hydrocortisone 2 5 % cream, Apply topically 3 (three) times a day, Disp: 15 g, Rfl: 2  •  KLS AllerClear D-12HR 5-120 MG per tablet, TAKE ONE TABLET BY MOUTH TWICE DAILY, Disp: 180 tablet, Rfl: 0  •  loratadine-pseudoephedrine (CLARITIN-D 24-HOUR)  mg per 24 hr tablet, Take 1 tablet by mouth daily (Patient not taking: Reported on 10/12/2022), Disp: 90 tablet, Rfl: 3  •  Multiple Vitamins-Minerals (MULTIVITAMIN ADULT PO), every 24 hours, Disp: , Rfl:   •  Omega-3 Fatty Acids (fish oil) 1,000 mg, Take by mouth, Disp: , Rfl:   •  omeprazole (PriLOSEC) 40 MG capsule, TAKE 1 CAPSULE BY MOUTH EVERY DAY BEFORE BREAKFAST, Disp: 90 capsule, Rfl: 5          • Whom besides the patient is providing clinical information about today's encounter?   o NO ADDITIONAL HISTORIAN (patient alone provided history)    Physical Exam and Assessment/Plan by Diagnosis:        1  DERMATOFIBROMA    Physical Exam:  • Anatomic Location Affected:  Left shoulder, Right calf, Left mid calf x2, Left thigh  • Morphological Description:  [place] firm papules with + dimple sign and consistent dermoscopy  • Pertinent Positives:  • Pertinent Negatives: Additional History of Present Condition:  N/A    Assessment and Plan:  Based on a thorough discussion of this condition and the management approach to it (including a comprehensive discussion of the known risks, side effects and potential benefits of treatment), the patient (family) agrees to implement the following specific plan:  • Reassured benign   • Monitor for chnages    Assessment and Plan:  A dermatofibroma is a common benign fibrous nodule that most often arises on the skin of the lower legs  A dermatofibroma is also called a "cutaneous fibrous histiocytoma "  Dermatofibromas occur at all ages and in people of every ethnicity  They are more common in women than in men  It is not clear if dermatofibroma is a reactive process or if it is a neoplasm  The lesions are made up of proliferating fibroblasts  Histiocytes may also be involved    They are sometimes attributed to an insect bite or ingrownhair or local trauma, but not consistently  They may be more numerous in patients with altered immunity  Dermatofibromas most often occur on the legs and arms, but may also arise on the trunk or any site of the body  Typical clinical features include the following:  • People may have 1 or up to 15 lesions  • Size varies from 0 5-1 5 cm diameter; most lesions are 7-10 mm diameter  • They are firm nodules tethered to the skin surface and mobile over subcutaneous tissue  • The skin "dimples" on pinching the lesion  • Color may be pink to light brown in white skin, and dark brown to black in dark skin; some appear paler in the center  • They do not usually cause symptoms, but they are sometimes painful or itchy  • Because they are often raised lesions, they may be traumatized, for example by a razor  • Occasionally dozens may erupt within a few months, usually in the setting of immunosuppression (for example autoimmune disease, cancer or certain medications)  • Dermatofibroma does not give rise to cancer  However, occasionally, it may be mistaken for dermatofibrosarcoma or desmoplastic melanoma  A dermatofibroma is harmless and seldom causes any symptoms  Usually, only reassurance is needed  If it is nuisance or causing concern, the lesion can be removed surgically, resulting in a scar that is, by definition, usually longer in diameter than the widest portion of the dermatofibroma  Cryotherapy, shave biopsy and laser surgery are rarely completely successful  Skin punch biopsy or incisional biopsy may be undertaken if there is an atypical feature such as recent enlargement, ulceration, or asymmetrical structures and colours on dermatoscopy        2  MELANOCYTIC NEVI ("Moles")    Physical Exam:   • Anatomic Location Affected:   Mostly on sun-exposed areas of the trunk and extremities  • Morphological Description:  Scattered, 1-4mm round to ovoid, symmetric and evenly bordered, regularly pigmented macules/papules without outliers other than if noted elsewhere in today's note  • Pertinent Positives:  • Pertinent Negatives: Additional History of Present Condition:      Assessment and Plan:  Based on a thorough discussion of this condition and the management approach to it (including a comprehensive discussion of the known risks, side effects and potential benefits of treatment), the patient (family) agrees to implement the following specific plan:  • The patient was encouraged to use an SPF30+ broad spectrum sunscreen daily and re-apply every 2-3 outdoors while outside  The importance of sun protection, self-skin exams, and sun avoidance was emphasized  An annual full body skin exam is recommended, and the patient was encouraged to return to the office sooner for any new or changing lesions of concerns  • Benign, reassured  • Annual skin check     Melanocytic Nevi  Melanocytic nevi ("moles") are tan or brown, raised or flat areas of the skin which have an increased number of melanocytes  Melanocytes are the cells in our body which make pigment and account for skin color  Some moles are present at birth (I e , "congenital nevi"), while others come up later in life (i e , "acquired nevi")  The sun can stimulate the body to make more moles  Sunburns are not the only thing that triggers more moles  Chronic sun exposure can do it too  Clinically distinguishing a healthy mole from melanoma may be difficult, even for experienced dermatologists  The "ABCDE's" of moles have been suggested as a means of helping to alert a person to a suspicious mole and the possible increased risk of melanoma  The suggestions for raising alert are as follows:    Asymmetry: Healthy moles tend to be symmetric, while melanomas are often asymmetric  Asymmetry means if you draw a line through the mole, the two halves do not match in color, size, shape, or surface texture   Asymmetry can be a result of rapid enlargement of a mole, the development of a raised area on a previously flat lesion, scaling, ulceration, bleeding or scabbing within the mole  Any mole that starts to demonstrate "asymmetry" should be examined promptly by a board certified dermatologist      Border: Healthy moles tend to have discrete, even borders  The border of a melanoma often blends into the normal skin and does not sharply delineate the mole from normal skin  Any mole that starts to demonstrate "uneven borders" should be examined promptly by a board certified dermatologist      Color: Healthy moles tend to be one color throughout  Melanomas tend to be made up of different colors ranging from dark black, blue, white, or red  Any mole that demonstrates a color change should be examined promptly by a board certified dermatologist      Diameter: Healthy moles tend to be smaller than 0 6 cm in size; an exception are "congenital nevi" that can be larger  Melanomas tend to grow and can often be greater than 0 6 cm (1/4 of an inch, or the size of a pencil eraser)  This is only a guideline, and many normal moles may be larger than 0 6 cm without being unhealthy  Any mole that starts to change in size (small to bigger or bigger to smaller) should be examined promptly by a board certified dermatologist      Evolving: Healthy moles tend to "stay the same "  Melanomas may often show signs of change or evolution such as a change in size, shape, color, or elevation  Any mole that starts to itch, bleed, crust, burn, hurt, or ulcerate or demonstrate a change or evolution should be examined promptly by a board certified dermatologist       3  Melanonychia Striata      Physical Exam:  • Anatomic Location Affected:  Bilateral toenails and fingernails    • Morphological Description:  Several uniformly colored brown streaks   • Right thumb- 2 mm, right index-3 mm , right middle 6 mm, left thumb 6 mm, left index 4 mm, left middle 2 mm  Negative Bond's sign on all nails   Prior photos reviewed in her chart- no changes from prior appearance in photos  • Pertinent Positives:  • Pertinent Negatives:     Additional History of Present Condition:  Initial visit for this condition 2/7/22 and patient states nails are stable without widening of bands on affected nails  Present for at least 3 years  Measurements compared to prior and stable or smaller on left index and left middle finger (by 1 mm each)      Assessment and Plan:  Based on a thorough discussion of this condition and the management approach to it (including a comprehensive discussion of the known risks, side effects and potential benefits of treatment), the patient (family) agrees to implement the following specific plan:  • Measurements and photos compared to prior and stable or smaller on left index and left middle finger (by 1 mm each)  • She is to monitor for change, widening, Bond's sign (described to patient)  No asymmetry or Bond's sign on any nails today  • Increased risk of melanoma developing in these lesions reviewed  • Follow up for changes     What are fungal nail infections? Fungal infection of the nails is also known as onychomycosis  It is increasingly common with increased age  It rarely affects children      Which organisms cause onychomycosis? Onychomycosis can be due to:  Dermatophytes such as Trichophyton rubrum (T  rubrum), T  interdigitale (tinea unguium)   Yeasts such as Candida albicans   Molds such as Scopulariopsis brevicaulis and Fusarium species      What are the clinical features of onychomycosis? Onychomycosis may affect one or more toenails and fingernails and most often involves the great toenail or the little toenail  It can present in one or several different patterns  • Lateral onychomycosis: a white or yellow opaque streak appears at one side of the nail  • Subungual hyperkeratosis: scaling occurs under the nail  • Distal onycholysis: the end of the nail lifts  The free edge often crumbles     • Superficial white onychomycosis: flaky white patches and pits appear on the top of the nail plate  • Proximal onychomycosis:yellow spots appear in the half-moon (lunula)  • Onychoma or dermatophytoma:a thick localized area of infection in the nail plate   • Destruction of the nail     Tinea unguium often results from untreated tinea pedis (feet) or tinea manuum (hand)  It may follow an injury to the nail or inflammatory disease of the nail  Candida infection of the nail plate generally results from paronychia and starts near the nail fold (the cuticle)  The nail fold is swollen and red, lifted off the nail plate  White, yellow, green or black marks appear on the nearby nail and spread  The nail may lift off its bed and is tender if you press on it  Mold infections are similar in appearance to tinea unguium  Onychomycosis must be distinguished from other nail disorders  • Bacterial infection especially Pseudomonas aeruginosa, which turns the nail black or green   • Psoriasis   • Eczema or dermatitis   • Lichen planus   • Viral warts   • Onycholysis   • Onychogryphosis (nail thickening and scaling under the nail), common in the elderly     How is the diagnosis of onychomycosis confirmed? Clippings should be taken from crumbling tissue at the end of the infected nail  The discolored surface of the nails can be scraped off  The debris can be scooped out from under the nail  The clippings and scrapings are sent to a mycology laboratory for microscopy and culture  Previous treatment can reduce the chance of growing the fungus successfully in a culture, so it is best to take the clippings before any treatment is commenced:  • To confirm the diagnosis -- antifungal treatment will not be successful if there is another explanation for the nail condition   • To identify the responsible organism   Molds and yeasts may require different treatment from dermatophyte fungi   Treatment may be required for a prolonged period and is expensive  Partially treated infection may be impossible to prove for many months as antifungal drugs can be detected even a year later  A nail biopsy may also reveal characteristic histopathological features of onychomycosis      What is the treatment of onychomycosis? Fingernail infections are usually cured more quickly and effectively than toenail infections  Mild infections affecting less than 50% of one or two nails may respond to topical antifungal medications, but cure usually requires an oral antifungal medication for several months      Devices used to treat onychomycosis  Recently, non-drug treatment has been developed to treat onychomycosis thus avoiding the side effects and risks of oral antifungal drugs  Lasers emitting infrared radiation are thought to kill fungi by the production of heat within the infected tissue  Laser treatment is reported to safely eradicate nail fungi with one to three, almost painless, sessions  Several lasers have been approved for this purpose by the FDA and other regulatory authorities  However, high-quality studies of efficacy are lacking, and existing studies indicate that laser treatment is less medically effective than topical or oral antifungal agents  Nd:YAG continuous, long or short-pulsed lasers   Ti:Sapphire modelocked laser   Diode laser        4  Hand Dermatitits  Physical Exam:  • Anatomic Location Affected:  Bilateral hands  • Morphological Description:  Erythematous scaly plaques on distal fingers  • Pertinent Positives:  • Pertinent Negatives: Additional History of Present Condition:  History of eczema  Sites get itchy and dry at times      Assessment and Plan:  Based on a thorough discussion of this condition and the management approach to it (including a comprehensive discussion of the known risks, side effects and potential benefits of treatment), the patient (family) agrees to implement the following specific plan:  • Please start Triamcinolone ointment apply topically to both hands twice daily for up to 14 days at a time as needed for flare ups  Do not put on face, groin or body folds     • Use a thick emollient such as Cerave EVERY time she washes her hands            Scribe Attestation    I,:  Alessandra Baum am acting as a scribe while in the presence of the attending physician :       I,:  Saud Perez MD personally performed the services described in this documentation    as scribed in my presence :

## 2023-02-07 NOTE — PATIENT INSTRUCTIONS
1  DERMATOFIBROMA      Assessment and Plan:  Based on a thorough discussion of this condition and the management approach to it (including a comprehensive discussion of the known risks, side effects and potential benefits of treatment), the patient (family) agrees to implement the following specific plan:  Reassured benign   Monitor for chnages    Assessment and Plan:  A dermatofibroma is a common benign fibrous nodule that most often arises on the skin of the lower legs  A dermatofibroma is also called a "cutaneous fibrous histiocytoma "  Dermatofibromas occur at all ages and in people of every ethnicity  They are more common in women than in men  It is not clear if dermatofibroma is a reactive process or if it is a neoplasm  The lesions are made up of proliferating fibroblasts  Histiocytes may also be involved  They are sometimes attributed to an insect bite or ingrownhair or local trauma, but not consistently  They may be more numerous in patients with altered immunity  Dermatofibromas most often occur on the legs and arms, but may also arise on the trunk or any site of the body  Typical clinical features include the following:  People may have 1 or up to 15 lesions  Size varies from 0 5-1 5 cm diameter; most lesions are 7-10 mm diameter  They are firm nodules tethered to the skin surface and mobile over subcutaneous tissue  The skin "dimples" on pinching the lesion  Color may be pink to light brown in white skin, and dark brown to black in dark skin; some appear paler in the center  They do not usually cause symptoms, but they are sometimes painful or itchy  Because they are often raised lesions, they may be traumatized, for example by a razor  Occasionally dozens may erupt within a few months, usually in the setting of immunosuppression (for example autoimmune disease, cancer or certain medications)  Dermatofibroma does not give rise to cancer   However, occasionally, it may be mistaken for dermatofibrosarcoma or desmoplastic melanoma  A dermatofibroma is harmless and seldom causes any symptoms  Usually, only reassurance is needed  If it is nuisance or causing concern, the lesion can be removed surgically, resulting in a scar that is, by definition, usually longer in diameter than the widest portion of the dermatofibroma  Cryotherapy, shave biopsy and laser surgery are rarely completely successful  Skin punch biopsy or incisional biopsy may be undertaken if there is an atypical feature such as recent enlargement, ulceration, or asymmetrical structures and colours on dermatoscopy  2  MELANOCYTIC NEVI ("Moles")    Assessment and Plan:  Based on a thorough discussion of this condition and the management approach to it (including a comprehensive discussion of the known risks, side effects and potential benefits of treatment), the patient (family) agrees to implement the following specific plan:  The patient was encouraged to use an SPF30+ broad spectrum sunscreen daily and re-apply every 2-3 outdoors while outside  The importance of sun protection, self-skin exams, and sun avoidance was emphasized  An annual full body skin exam is recommended, and the patient was encouraged to return to the office sooner for any new or changing lesions of concerns  Benign, reassured  Annual skin check     Melanocytic Nevi  Melanocytic nevi ("moles") are tan or brown, raised or flat areas of the skin which have an increased number of melanocytes  Melanocytes are the cells in our body which make pigment and account for skin color  Some moles are present at birth (I e , "congenital nevi"), while others come up later in life (i e , "acquired nevi")  The sun can stimulate the body to make more moles  Sunburns are not the only thing that triggers more moles  Chronic sun exposure can do it too  Clinically distinguishing a healthy mole from melanoma may be difficult, even for experienced dermatologists   The "ABCDE's" of moles have been suggested as a means of helping to alert a person to a suspicious mole and the possible increased risk of melanoma  The suggestions for raising alert are as follows:    Asymmetry: Healthy moles tend to be symmetric, while melanomas are often asymmetric  Asymmetry means if you draw a line through the mole, the two halves do not match in color, size, shape, or surface texture  Asymmetry can be a result of rapid enlargement of a mole, the development of a raised area on a previously flat lesion, scaling, ulceration, bleeding or scabbing within the mole  Any mole that starts to demonstrate "asymmetry" should be examined promptly by a board certified dermatologist      Border: Healthy moles tend to have discrete, even borders  The border of a melanoma often blends into the normal skin and does not sharply delineate the mole from normal skin  Any mole that starts to demonstrate "uneven borders" should be examined promptly by a board certified dermatologist      Color: Healthy moles tend to be one color throughout  Melanomas tend to be made up of different colors ranging from dark black, blue, white, or red  Any mole that demonstrates a color change should be examined promptly by a board certified dermatologist      Diameter: Healthy moles tend to be smaller than 0 6 cm in size; an exception are "congenital nevi" that can be larger  Melanomas tend to grow and can often be greater than 0 6 cm (1/4 of an inch, or the size of a pencil eraser)  This is only a guideline, and many normal moles may be larger than 0 6 cm without being unhealthy  Any mole that starts to change in size (small to bigger or bigger to smaller) should be examined promptly by a board certified dermatologist      Evolving: Healthy moles tend to "stay the same "  Melanomas may often show signs of change or evolution such as a change in size, shape, color, or elevation    Any mole that starts to itch, bleed, crust, burn, hurt, or ulcerate or demonstrate a change or evolution should be examined promptly by a board certified dermatologist       3  Melanonychia Striata        Assessment and Plan:  Based on a thorough discussion of this condition and the management approach to it (including a comprehensive discussion of the known risks, side effects and potential benefits of treatment), the patient (family) agrees to implement the following specific plan:  Please try to provide us with old photographs and let us know of any worsening and condition  What are fungal nail infections? Fungal infection of the nails is also known as onychomycosis  It is increasingly common with increased age  It rarely affects children  Which organisms cause onychomycosis? Onychomycosis can be due to:  Dermatophytes such as Trichophyton rubrum (T  rubrum), T  interdigitale (tinea unguium)   Yeasts such as Candida albicans   Molds such as Scopulariopsis brevicaulis and Fusarium species  What are the clinical features of onychomycosis? Onychomycosis may affect one or more toenails and fingernails and most often involves the great toenail or the little toenail  It can present in one or several different patterns  Lateral onychomycosis: a white or yellow opaque streak appears at one side of the nail  Subungual hyperkeratosis: scaling occurs under the nail  Distal onycholysis: the end of the nail lifts  The free edge often crumbles  Superficial white onychomycosis: flaky white patches and pits appear on the top of the nail plate  Proximal onychomycosis:yellow spots appear in the half-moon (lunula)  Onychoma or dermatophytoma:a thick localized area of infection in the nail plate   Destruction of the nail     Tinea unguium often results from untreated tinea pedis (feet) or tinea manuum (hand)  It may follow an injury to the nail or inflammatory disease of the nail    Candida infection of the nail plate generally results from paronychia and starts near the nail fold (the cuticle)  The nail fold is swollen and red, lifted off the nail plate  White, yellow, green or black marks appear on the nearby nail and spread  The nail may lift off its bed and is tender if you press on it  Mold infections are similar in appearance to tinea unguium  Onychomycosis must be distinguished from other nail disorders  Bacterial infection especially Pseudomonas aeruginosa, which turns the nail black or green   Psoriasis   Eczema or dermatitis   Lichen planus   Viral warts   Onycholysis   Onychogryphosis (nail thickening and scaling under the nail), common in the elderly     How is the diagnosis of onychomycosis confirmed? Clippings should be taken from crumbling tissue at the end of the infected nail  The discolored surface of the nails can be scraped off  The debris can be scooped out from under the nail  The clippings and scrapings are sent to a mycology laboratory for microscopy and culture  Previous treatment can reduce the chance of growing the fungus successfully in a culture, so it is best to take the clippings before any treatment is commenced: To confirm the diagnosis -- antifungal treatment will not be successful if there is another explanation for the nail condition   To identify the responsible organism  Molds and yeasts may require different treatment from dermatophyte fungi   Treatment may be required for a prolonged period and is expensive  Partially treated infection may be impossible to prove for many months as antifungal drugs can be detected even a year later  A nail biopsy may also reveal characteristic histopathological features of onychomycosis  What is the treatment of onychomycosis? Fingernail infections are usually cured more quickly and effectively than toenail infections    Mild infections affecting less than 50% of one or two nails may respond to topical antifungal medications, but cure usually requires an oral antifungal medication for several months  Devices used to treat onychomycosis  Recently, non-drug treatment has been developed to treat onychomycosis thus avoiding the side effects and risks of oral antifungal drugs  Lasers emitting infrared radiation are thought to kill fungi by the production of heat within the infected tissue  Laser treatment is reported to safely eradicate nail fungi with one to three, almost painless, sessions  Several lasers have been approved for this purpose by the FDA and other regulatory authorities  However, high-quality studies of efficacy are lacking, and existing studies indicate that laser treatment is less medically effective than topical or oral antifungal agents  Nd:YAG continuous, long or short-pulsed lasers   Ti:Sapphire modelocked laser   Diode laser        4  Hand Dermatitits    Assessment and Plan:  Based on a thorough discussion of this condition and the management approach to it (including a comprehensive discussion of the known risks, side effects and potential benefits of treatment), the patient (family) agrees to implement the following specific plan:  Please start Triamcinolone ointment apply topically to both hands twice daily for up to 14 days at a time as needed for flare ups  Do not put on face, groin or body folds

## 2023-02-13 ENCOUNTER — OFFICE VISIT (OUTPATIENT)
Dept: FAMILY MEDICINE CLINIC | Facility: CLINIC | Age: 58
End: 2023-02-13

## 2023-02-13 VITALS
DIASTOLIC BLOOD PRESSURE: 70 MMHG | RESPIRATION RATE: 16 BRPM | WEIGHT: 139.4 LBS | SYSTOLIC BLOOD PRESSURE: 142 MMHG | HEIGHT: 60 IN | OXYGEN SATURATION: 99 % | TEMPERATURE: 97.5 F | BODY MASS INDEX: 27.37 KG/M2 | HEART RATE: 98 BPM

## 2023-02-13 DIAGNOSIS — S46.911A MUSCLE STRAIN OF RIGHT UPPER ARM, INITIAL ENCOUNTER: Primary | ICD-10-CM

## 2023-02-13 RX ORDER — METHOCARBAMOL 500 MG/1
500 TABLET, FILM COATED ORAL 3 TIMES DAILY
Qty: 30 TABLET | Refills: 0 | Status: SHIPPED | OUTPATIENT
Start: 2023-02-13

## 2023-02-13 RX ORDER — IBUPROFEN 200 MG
800 TABLET ORAL EVERY 8 HOURS PRN
Qty: 30 TABLET | Refills: 0 | Status: SHIPPED | OUTPATIENT
Start: 2023-02-13

## 2023-02-13 NOTE — PROGRESS NOTES
Subjective:   Chief Complaint   Patient presents with   • Shoulder Pain   • Arm Pain        Patient ID: Desiree Bass is a 62 y o  female  Patient presents today for right arm pain  A few weeks ago, she started with her right arm "tightening up" and then some tightness in the top middle of her back  Patient states that she goes to the gym but does not feel she does anything strenuous at the gym  At the gym, she does the stepping machine, has cut her rowing back from 30 minutes to 15 minutes, and uses machines for her abs  She has tried taking alleve and motrin for pain without relief  Patient soaked in Epson salt last night and reports some relief today  The following portions of the patient's history were reviewed and updated as appropriate: allergies, current medications, past family history, past medical history, past social history, past surgical history and problem list     Review of Systems   Constitutional: Negative for chills, fatigue and fever  Respiratory: Negative for chest tightness and shortness of breath  Cardiovascular: Negative for chest pain and palpitations  Musculoskeletal: Positive for arthralgias (right shoulder)  Negative for back pain, neck pain and neck stiffness  Psychiatric/Behavioral: Negative for dysphoric mood  The patient is not nervous/anxious  Objective:  Vitals:    02/13/23 1511   BP: 142/70   BP Location: Left arm   Patient Position: Sitting   Cuff Size: Adult   Pulse: 98   Resp: 16   Temp: 97 5 °F (36 4 °C)   SpO2: 99%   Weight: 63 2 kg (139 lb 6 4 oz)   Height: 5' (1 524 m)      Physical Exam  Vitals reviewed  Constitutional:       Appearance: Normal appearance  Cardiovascular:      Rate and Rhythm: Normal rate and regular rhythm  Heart sounds: Normal heart sounds  Pulmonary:      Effort: Pulmonary effort is normal       Breath sounds: Normal breath sounds  No stridor     Musculoskeletal:         General: No swelling, deformity or signs of injury  Normal range of motion  Right shoulder: Tenderness present  No swelling or effusion  Left shoulder: Normal       Right upper arm: Normal       Left upper arm: Normal         Arms:    Skin:     General: Skin is warm and dry  Capillary Refill: Capillary refill takes less than 2 seconds  Neurological:      Mental Status: She is alert and oriented to person, place, and time  Sensory: Sensation is intact  Psychiatric:         Mood and Affect: Mood normal          Behavior: Behavior normal            Assessment/Plan:    No problem-specific Assessment & Plan notes found for this encounter  Diagnoses and all orders for this visit:    Muscle strain of right upper arm, initial encounter  Comments:  encouraged to apply heat or ice as needed for no more than 20 minutes and leave an hour inbetween applications  Orders:  -     methocarbamol (ROBAXIN) 500 mg tablet; Take 1 tablet (500 mg total) by mouth 3 (three) times a day  -     ibuprofen (MOTRIN) 200 mg tablet;  Take 4 tablets (800 mg total) by mouth every 8 (eight) hours as needed for mild pain With food

## 2023-02-15 ENCOUNTER — TELEPHONE (OUTPATIENT)
Dept: FAMILY MEDICINE CLINIC | Facility: CLINIC | Age: 58
End: 2023-02-15

## 2023-02-15 DIAGNOSIS — S46.911A MUSCLE STRAIN OF RIGHT UPPER ARM, INITIAL ENCOUNTER: Primary | ICD-10-CM

## 2023-02-15 NOTE — TELEPHONE ENCOUNTER
Patient seen 2/13 prescribed Robaxin  She states this is not working for her and would like to know if there could be possible inflammation and something else she can take for it

## 2023-02-15 NOTE — TELEPHONE ENCOUNTER
We discussed at her visit this is most likely inflammatory and that was why she was given the ibuprofen 800 mg    The Robaxin is a muscle relaxant

## 2023-02-15 NOTE — TELEPHONE ENCOUNTER
Pt. Was informed to keep her regular ob appt  In 2 weeks.    Recommend PT as rehabing the muscle is in order as we discussed  Referral entered   Please give her the phone number to call and schedule

## 2023-02-15 NOTE — TELEPHONE ENCOUNTER
Patient states she took Aleve for 7 days once daily prior to being seen here and it  did nothing for her  She states these things are not working for her the inflammation is not going anywhere her arm is still hurting/ throbbing

## 2023-02-16 DIAGNOSIS — S46.911A MUSCLE STRAIN OF RIGHT UPPER ARM, INITIAL ENCOUNTER: Primary | ICD-10-CM

## 2023-02-16 RX ORDER — PREDNISONE 20 MG/1
40 TABLET ORAL DAILY
Qty: 10 TABLET | Refills: 0 | Status: SHIPPED | OUTPATIENT
Start: 2023-02-16 | End: 2023-02-21

## 2023-02-16 NOTE — TELEPHONE ENCOUNTER
Patient called the office because she was upset the provider referred her to PT since she does not believe she needs it  She also c/o tingling on fingers since she has been taking the Ibuprofen 800 mg three times a day  States she feels better when she does not take the Ibuprofen at all and was advised by the pharmacist not to take the Robaxin for the inflammation  States in the past was prescribed Prednisone for three days  Attempted to explain to the patient PT was ordered to make sure the same sort of injury does not reoccur  Patient was c/o tightness in the muscle which is why the Robaxin was prescribed  Patient stated she understood but still was questioning the orders for PT  Provider spoke to patient over the phone and explained reasoning again and answered questions, will send in Rx for Prednisone to pharmacy

## 2023-02-25 ENCOUNTER — OFFICE VISIT (OUTPATIENT)
Dept: URGENT CARE | Facility: MEDICAL CENTER | Age: 58
End: 2023-02-25

## 2023-02-25 ENCOUNTER — APPOINTMENT (OUTPATIENT)
Dept: RADIOLOGY | Facility: MEDICAL CENTER | Age: 58
End: 2023-02-25

## 2023-02-25 VITALS
BODY MASS INDEX: 27.29 KG/M2 | HEIGHT: 60 IN | DIASTOLIC BLOOD PRESSURE: 60 MMHG | WEIGHT: 139 LBS | HEART RATE: 114 BPM | SYSTOLIC BLOOD PRESSURE: 140 MMHG | TEMPERATURE: 96.8 F | OXYGEN SATURATION: 97 %

## 2023-02-25 DIAGNOSIS — M54.12 CERVICAL RADICULOPATHY: Primary | ICD-10-CM

## 2023-02-25 DIAGNOSIS — M54.12 CERVICAL RADICULOPATHY: ICD-10-CM

## 2023-02-25 RX ORDER — PREDNISONE 20 MG/1
TABLET ORAL
Qty: 18 TABLET | Refills: 0 | Status: SHIPPED | OUTPATIENT
Start: 2023-02-25

## 2023-02-25 NOTE — PROGRESS NOTES
[facility]        NAME: David Fry is a 62 y o  female  : 1965    MRN: [id number]  DATE: 2023  TIME: 1:06 PM    Assessment and Plan   Cervical radiculopathy [M54 12]  1  Cervical radiculopathy  XR spine cervical complete 4 or 5 vw non injury    Ambulatory Referral to [facility]    predniSONE 20 mg tablet            Patient Instructions       Follow up with PCP in 3-5 days  Proceed to  ER if symptoms worsen  Chief Complaint     Chief Complaint   Patient presents with   • Neck Pain     Patient states she started on the  with neck and shoulder pain, it then spread down her R arm  She was prescribed robaxin and advil  She called the PCP again and was placed on steroids  She is still having throbbing in her hand and it feels cold         History of Present Illness       42-year-old female here today with complaint of neck and right shoulder pain since February 3  Denies any recent history of trauma or fall  Pain radiates down her right arm at times  She was first seen for this prior by her PCP on   Nurse practitioner she saw prescribed Robaxin and recommended over-the-counter Advil 800 mg  Symptoms did not seem to improve  She contacted PCPs office who then prescribed prednisone 40 mg over 5-day  Prescribed  with some mild improvement  She is experiencing presently some throbbing of her right hand cold sensation but denies weakness of the right hand  She is right-handed  Denies any previous neck or upper back injuries in the past   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  She question whether the symptoms could be related to change in her mattress  Pain at times is 6 out of 10      Review of Systems   Review of Systems   Musculoskeletal: Positive for arthralgias and neck pain  Neurological: Positive for numbness  Negative for weakness           Current Medications       Current Outpatient Medications:   •  predniSONE 20 mg tablet, 3 tablets once daily day 1 through 3 then 2 tablets daily day 4 through 6 then 1 tablet daily day 7 through 9 , Disp: 18 tablet, Rfl: 0  •  Ascorbic Acid (RALPH-C PO), Take by mouth  , Disp: , Rfl:   •  cholecalciferol (VITAMIN D3) 1,000 units tablet, Take 1,000 Units by mouth daily , Disp: , Rfl:   •  estradiol-norethindrone (COMBIPATCH) 0 05-0 14 MG/DAY, Place 1 patch on the skin 2 (two) times a week, Disp: 24 patch, Rfl: 3  •  hydrocortisone 2 5 % cream, Apply topically 3 (three) times a day, Disp: 15 g, Rfl: 2  •  ibuprofen (MOTRIN) 200 mg tablet, Take 4 tablets (800 mg total) by mouth every 8 (eight) hours as needed for mild pain With food, Disp: 30 tablet, Rfl: 0  •  KLS AllerClear D-12HR 5-120 MG per tablet, TAKE ONE TABLET BY MOUTH TWICE DAILY, Disp: 180 tablet, Rfl: 0  •  loratadine-pseudoephedrine (CLARITIN-D 24-HOUR)  mg per 24 hr tablet, Take 1 tablet by mouth daily, Disp: 90 tablet, Rfl: 3  •  methocarbamol (ROBAXIN) 500 mg tablet, Take 1 tablet (500 mg total) by mouth 3 (three) times a day, Disp: 30 tablet, Rfl: 0  •  Multiple Vitamins-Minerals (MULTIVITAMIN ADULT PO), every 24 hours, Disp: , Rfl:   •  Omega-3 Fatty Acids (fish oil) 1,000 mg, Take by mouth, Disp: , Rfl:   •  omeprazole (PriLOSEC) 40 MG capsule, TAKE 1 CAPSULE BY MOUTH EVERY DAY BEFORE BREAKFAST (Patient not taking: Reported on 2/7/2023), Disp: 90 capsule, Rfl: 5  •  triamcinolone (KENALOG) 0 1 % cream, Apply topically to both hands twice daily for up to 14 days at a time as needed for eczema flare ups on hands  Do not put on face, groin or body folds  , Disp: 80 g, Rfl: 2    Current Allergies     Allergies as of 02/25/2023 - Reviewed 02/25/2023   Allergen Reaction Noted   • Cefuroxime  02/21/2013   • Cetirizine  04/27/2018   • Fexofenadine  04/27/2018   • Penicillins  04/27/2018   • Sulfa antibiotics  02/21/2013   • Sulfanilamide  04/27/2018            The following portions of the patient's history were reviewed and updated as appropriate: allergies, current medications, past family history, past medical history, past social history, past surgical history and problem list      Past Medical History:   Diagnosis Date   • Fibroids 1994    UTERUS       Past Surgical History:   Procedure Laterality Date   • ESOPHAGOGASTRODUODENOSCOPY     • MYOMECTOMY     • OTHER SURGICAL HISTORY      fatty tissue removal   • WISDOM TOOTH EXTRACTION         Family History   Problem Relation Age of Onset   • Hypertension Mother    • Lung cancer Father 72   • No Known Problems Sister    • Lymphoma Maternal Grandmother         age unknown   • No Known Problems Maternal Aunt    • No Known Problems Maternal Aunt    • No Known Problems Maternal Aunt    • No Known Problems Maternal Aunt    • Kidney failure Maternal Uncle    • No Known Problems Paternal Aunt    • No Known Problems Paternal Aunt    • No Known Problems Paternal Aunt    • Heart attack Family    • Stroke Family          Medications have been verified  Objective   /60   Pulse (!) 114   Temp (!) 96 8 °F (36 °C)   Ht 5' (1 524 m)   Wt 63 kg (139 lb)   LMP 07/03/2017   SpO2 97%   BMI 27 15 kg/m²   Patient's last menstrual period was 07/03/2017  Physical Exam     Physical Exam  Vitals and nursing note reviewed  Constitutional:       Appearance: Normal appearance  Musculoskeletal:         General: Tenderness present  Cervical back: Normal range of motion  Comments: C-spine: Full range of motion  Tenderness over the right trapezius muscle and also the cervical spine  Spurling test-negative  Thoracic spine: Full range of motion  Nontender  Extremities: Upper-good strength and tone, 5 out of 5  Lymphadenopathy:      Cervical: No cervical adenopathy  Neurological:      Mental Status: She is alert

## 2023-02-25 NOTE — PATIENT INSTRUCTIONS
Cervical spine x-ray reveals some degenerative changes mild to moderate with loss of cervical lordosis  No fracture or subluxation observed  Official radiology interpretation is pending  Since patient is having symptoms consistent with cervical radiculopathy, I started the patient empirically on prednisone tapering from 60 down to 20 mg over 9 days  Advised patient to take Robaxin 500 mg at bedtime as needed  She was warned about possible somnolence  Advised to apply heating pad to affected area as needed  She was given a referral for outpatient comprehensive spine program   Patient advised to avoid excessive neck flexion  She expressed understanding  Cervical Radiculopathy   WHAT YOU NEED TO KNOW:   Cervical radiculopathy is a painful condition that happens when a spinal nerve in your neck is pinched or irritated  DISCHARGE INSTRUCTIONS:   Medicines: You may need any of the following:  NSAIDs  help decrease swelling and pain  This medicine can be bought without a doctor's order  This medicine can cause stomach bleeding or kidney problems in certain people  If you take blood thinner medicine, always ask your provider if NSAIDs are safe for you  Always read the medicine label and follow the directions on it before using this medicine  Prescription pain medicine  helps decrease pain  Do not wait until the pain is severe before you take this medicine  Steroids  help decrease pain and swelling  These may be given as a pill or as an injection in your neck  You may need more than 1 injection if your symptoms do not improve after the first treatment  Take your medicine as directed  Contact your healthcare provider if you think your medicine is not helping or if you have side effects  Tell your provider if you are allergic to any medicine  Keep a list of the medicines, vitamins, and herbs you take  Include the amounts, and when and why you take them   Bring the list or the pill bottles to follow-up visits  Carry your medicine list with you in case of an emergency  Follow up with your healthcare provider or spine specialist as directed:  Write down your questions so you remember to ask them during your visits  Physical therapy:  Your provider may suggest physical therapy to stretch and strengthen your muscles  Your physical therapist can teach you how to improve your posture and the way you hold your neck  The therapist may also teach you how to be safely active and avoid more injury  The therapist can also help you develop an exercise program that is safe for your back and neck  Self-care:   Ice  helps decrease swelling and pain  Ice may also help prevent tissue damage  Use an ice pack, or put crushed ice in a plastic bag  Cover it with a towel and place it on your neck for 15 to 20 minutes every hour or as directed  Rest  when you feel it is needed  Slowly start to do more each day  Return to your daily activities as directed  Wear a soft collar  You may be given a soft collar to support your neck while you sleep  Wear the soft collar only as directed  Do light stretches and regular exercise  Your provider may suggest light stretches to help decrease stiffness in your neck and arm as you recover  After your pain is controlled, you may benefit from regular exercise  Ask what type of exercise is safe for your back and neck  Review your work area  A comfortable work area can help prevent neck strain  Ask your employer for an ergonomic review to check the position of your desk, chair, phone, and computer  Make any necessary adjustments for your comfort  Contact your healthcare provider or spine specialist if:   You have a fever  You are losing weight without trying  Your pain is worse, even with medicine  One or both hands feel more numb than before, or you cannot move your fingers well  You have questions or concerns about your condition or care      © Copyright [employer] 2022 Information is for End User's use only and may not be sold, redistributed or otherwise used for commercial purposes  The above information is an  only  It is not intended as medical advice for individual conditions or treatments  Talk to your doctor, nurse or pharmacist before following any medical regimen to see if it is safe and effective for you

## 2023-02-27 ENCOUNTER — NURSE TRIAGE (OUTPATIENT)
Dept: PHYSICAL THERAPY | Facility: OTHER | Age: 58
End: 2023-02-27

## 2023-02-27 ENCOUNTER — TELEPHONE (OUTPATIENT)
Dept: PHYSICAL THERAPY | Facility: OTHER | Age: 58
End: 2023-02-27

## 2023-02-27 DIAGNOSIS — M54.12 CERVICAL RADICULOPATHY: Primary | ICD-10-CM

## 2023-02-27 NOTE — TELEPHONE ENCOUNTER
Called placed to the patient per Comprehensive Spine Program referral     V/M left for patient to call back  Phone number and hours of business provided  This is the 1st attempt to reach the patient  Will defer per [facility] protocol

## 2023-02-27 NOTE — TELEPHONE ENCOUNTER
Additional Information  • Negative: Is this related to an MVA? • Negative: Is this related to a work injury? • Negative: Are you currently recieving homecare services? Background - Initial Assessment  Clinical complaint: Urgent care visit on 02/25/23 due to Neck pain that started on her shoulder then radiates to her neck , R- arm, hand, fingers  Pain started on 02/03/23 , went to see a NP on 02/13 who prescribed 4 Advil x3 , and Rovaxin just with a little bit of relief  On 02/25 the pain was severe and Dr  Prescribed prednisone  NK[facility], not seeing another Dr  For this pain  Today she felt tingling sensation on her hand  No numbness  Described pain as throbbing, tingling (sometimes)    Date of onset: 02/03/2023  Frequency of pain: intermittent  Quality of pain: throbbing    Protocols used: [facility] COMPREHENSIVE SPINE PROGRAM PROTOCOL

## 2023-02-27 NOTE — TELEPHONE ENCOUNTER
Additional Information  • Negative: Has the patient had unexplained weight loss? • Negative: Does the patient have a fever? • Negative: Is the patient experiencing blood in sputum? • Negative: Is the patient experiencing urine retention? • Negative: Is the patient experiencing acute drop foot or paralysis? • Negative: Has the patient experienced major trauma? (fall from height, high speed collision, direct blow to spine) and is also experiencing nausea, light-headedness, or loss of consciousness? • Negative: Is this a chronic condition? Protocols used: [facility] COMPREHENSIVE SPINE PROGRAM PROTOCOL    This RN did review in detail the Comprehensive Spine Program and what we can provide for their neck pain  Patient is agreeable to being triaged by this RN and would like to proceed with Physical Therapy  Referral was placed for Physical Therapy at the [facility] site  Patients information was sent to the  to make evaluation appointment  Patient made aware that the PT office  will be calling to schedule the appointment  Patient was provided with the phone number to the PT office  No further questions and/or concerns were voiced by the patient at this time  Patient states understanding of the referral that was placed  Referral Closed

## 2023-03-08 ENCOUNTER — EVALUATION (OUTPATIENT)
Dept: PHYSICAL THERAPY | Facility: CLINIC | Age: 58
End: 2023-03-08

## 2023-03-08 DIAGNOSIS — M54.12 CERVICAL RADICULOPATHY: Primary | ICD-10-CM

## 2023-03-08 NOTE — PROGRESS NOTES
PT Evaluation     Today's date: 3/8/2023  Patient name: Gita Phillip  : 1965  MRN: [id number]  Referring provider: Luisana Bustamante, PT  Dx:   Encounter Diagnosis     ICD-10-CM    1  Cervical radiculopathy  M54 12 Ambulatory referral to PT spine                     Assessment  Assessment details: Gita Phillip is a 62 y o  female presenting to physical therapy with referral into [facility] comprehensive spine program secondary to acute onset of cervical spine pain 2 months ago falling into the moderate risk category for low back pain  Patient presents with pain, decreased range of motion and decreased activity tolerance  Assessment demonstrates right sided cervical radiculopathy with centralization of symptoms via cervical retraction/extension and moderate radial nerve tension  Secondary to these impairments, patient has increased difficulty performing ADL's, household chores and  work related tasks  Shireen Im would benefit from skilled PT to address these issues and maximize function    Thank you for the referral   Impairments: abnormal muscle tone, abnormal or restricted ROM, abnormal movement, activity intolerance, impaired physical strength and pain with function  Understanding of Dx/Px/POC: excellent   Prognosis: good    Goals  Short Term Goals: to be achieved by 4 weeks  1) Patient to be independent with initial HEP  2) Decrease pain to < or equal to 4/10 at its worst  3) Increase cervical spine ROM by 25% in all deficient planes  4) Patient to report decreased symptom frequency with prolonged sitting/desk work    Long Term Goals: to be achieved by discharge  1) Increase FOTO score > or equal to expected outcome  2) Patient to be independent with comprehensive HEP  3) Abolish pain for improved quality of life  4) Cervical spine AROM WNL all planes to improve a/iadls  5) Patient to report no pain associated with prolonged sitting/desk work      Plan  Plan details: Low Risk = 1-2 visits  Moderate Risk = 6-8 visits  High Risk = 12-18 visits  Patient would benefit from: skilled PT  Planned modality interventions: TENS and thermotherapy: hydrocollator packs  Planned therapy interventions: joint mobilization, manual therapy, neuromuscular re-education, patient education, strengthening, stretching, therapeutic exercise, home exercise program, ADL training and abdominal trunk stabilization  Frequency: 1x week  Duration in weeks: 8  Treatment plan discussed with: patient        Subjective Evaluation    History of Present Illness  Mechanism of injury: Patient reports to outpatient PT secondary to the onset of cervical spine radiculopathy into the right arm/shoulder and hand  Patient states that the pain began insidiously > 1 month prior  Patient trailed prednisone which helped but returned once the medication ran its course  Patient describes the pain as throbbing which is worse with cervical flexion ,sitting and improved with STM with massage ball on the arm  X-rays were obtained and demonstrate mild C5/C6 foraminal stenosis on the right  Patient works in quality control performing mostly desk work and notes difficulty with work duties, ADL's and leisure functions as a result  Patients goals for PT are to decrease the pain and return to PLOF              Not a recurrent problem   Quality of life: excellent    Pain  Current pain ratin  At best pain ratin  At worst pain ratin  Quality: throbbing, radiating and tight  Relieving factors: rest and relaxation  Aggravating factors: keyboarding (cervical flexion)  Progression: worsening    Social Support    Employment status: working  Hand dominance: right      Diagnostic Tests  X-ray: abnormal  Treatments  Previous treatment: medication  Current treatment: medication  Patient Goals  Patient goals for therapy: increased strength, independence with ADLs/IADLs, return to sport/leisure activities, increased motion and decreased pain          Objective Concurrent Complaints  Negative for night pain and disturbed sleep    Postural Observations  Seated posture: fair  Standing posture: fair        Neurological Testing     Sensation   Cervical/Thoracic   Left   Intact: light touch    Right   Intact: light touch    Reflexes   Left   Biceps (C5/C6): normal (2+)  Brachioradialis (C6): normal (2+)    Right   Biceps (C5/C6): normal (2+)  Brachioradialis (C6): normal (2+)    Active Range of Motion   Cervical/Thoracic Spine       Cervical  Subcranial protraction:  WFL   Subcranial retraction:  WFL   Flexion:  WFL  Extension:  Restriction level: minimal  Left lateral flexion:  Restriction level: minimal  Right lateral flexion:  Restriction level minimal  Left rotation:  Restriction level: minimal  Right rotation:  Restriction level: minimal    Joint Play   Joints within functional limits: C4 and C5     Hypomobile: C2, C3, C6, C7, T1 and T2   Mechanical Assessment    Cervical    Seated Protrusion: repeated movements   Pain location: no change  Seated retraction: repeated movements   Pain location: no change  Seated Flexion:  repeated movements  Pain location: peripheralized  Pain intensity: worse  Seated Extension: repeated movements  Pain location: no change  Supine retractation: repeated movements  Pain location: centralized  Pain intensity: better    Thoracic      Lumbar      Strength/Myotome Testing   Cervical Spine     Left   Normal strength    Right   Normal strength    Tests   Cervical   Positive vertical compression and cervical distraction test     Left   Negative Spurling's Test A  Right   Positive Spurling's Test A  Right Shoulder   Positive ULTT1  Lumbar   Positive vertical compression                 Precautions: N/A      Manuals 3/8            C5/C6 cervical opening glides             Manual cervical traction                                       Neuro Re-Ed Ther Ex             UBE w/ postural correction             Supine cervical tractions 2x10 x5"            Thoracic extension in chair 2x10 x10"            R radial nerve glides  2x10            Cervical retraction w/ TB             TB row review             Overhead activity review                          Ther Activity                                       Gait Training                                       Modalities

## 2023-03-17 ENCOUNTER — OFFICE VISIT (OUTPATIENT)
Dept: PHYSICAL THERAPY | Facility: CLINIC | Age: 58
End: 2023-03-17

## 2023-03-17 DIAGNOSIS — M54.12 CERVICAL RADICULOPATHY: Primary | ICD-10-CM

## 2023-03-17 NOTE — PROGRESS NOTES
Daily Note     Today's date: 3/17/2023  Patient name: Ann Marie Humphreys  : 1965  MRN: [id number]  Referring provider: Chelo Juan PT  Dx:   Encounter Diagnosis     ICD-10-CM    1  Cervical radiculopathy  M54 12                      Subjective: Patient reports bouts of improvement and times where symptoms seem worse over the past week  Notes that she was woken up from mid back (thoracic) pain last night  Objective: See treatment diary below      Assessment: Good tolerance to manuals with some centralization to the arm (from the hand) with cervical traction performed manually  With cervical retractions in supine, patient also demonstrated centralization of symptoms but form was modified as patient was performing cervical extension, not retractions  Began mechanical traction per relief with manuals for an increased hold time and patient tolerated well  Plan: Continue per plan of care        Precautions: N/A      Manuals 3/8 3/17           R C5/C6 cervical opening glides  G4           Manual cervical traction  G4                                     Neuro Re-Ed                                                                                                        Ther Ex             UBE w/ postural correction  L1  4'           Supine cervical tractions 2x10 x5" 2x10 x5"           Thoracic extension in chair 2x10 x10" 2x10 x10"           R radial nerve glides  2x10 2x10           Cervical retraction w/ TB - pink  2x10 x3"           TB row review             Overhead activity review                          Ther Activity                                       Gait Training                                       Modalities             Cervical traction  26#  10'

## 2023-03-22 ENCOUNTER — TELEPHONE (OUTPATIENT)
Dept: OTHER | Facility: OTHER | Age: 58
End: 2023-03-22

## 2023-03-22 NOTE — TELEPHONE ENCOUNTER
Patient is calling to schedule an appointment with Dr Ramesh Vera on the advice her physical therapist for pain

## 2023-03-23 ENCOUNTER — OFFICE VISIT (OUTPATIENT)
Dept: FAMILY MEDICINE CLINIC | Facility: CLINIC | Age: 58
End: 2023-03-23

## 2023-03-23 VITALS
OXYGEN SATURATION: 98 % | TEMPERATURE: 97.6 F | SYSTOLIC BLOOD PRESSURE: 130 MMHG | WEIGHT: 140 LBS | BODY MASS INDEX: 27.34 KG/M2 | HEART RATE: 99 BPM | DIASTOLIC BLOOD PRESSURE: 72 MMHG

## 2023-03-23 DIAGNOSIS — M54.12 CERVICAL RADICULOPATHY: Primary | ICD-10-CM

## 2023-03-23 RX ORDER — PREDNISONE 10 MG/1
TABLET ORAL
Qty: 20 TABLET | Refills: 0 | Status: SHIPPED | OUTPATIENT
Start: 2023-03-23 | End: 2023-03-31

## 2023-03-23 RX ORDER — METHOCARBAMOL 750 MG/1
750 TABLET, FILM COATED ORAL EVERY 8 HOURS SCHEDULED
Qty: 30 TABLET | Refills: 0 | Status: SHIPPED | OUTPATIENT
Start: 2023-03-23

## 2023-03-23 RX ORDER — COVID-19 ANTIGEN TEST
KIT MISCELLANEOUS
COMMUNITY
Start: 2023-03-09

## 2023-03-23 NOTE — ASSESSMENT & PLAN NOTE
Waxes and wanes  · Improved with prednisone  · Slightly worsened with PT  · Strength is intact b/l, diminished biceps reflex on right, positive spurling on right  · xr spine showed mild foraminal stenosis at C5-C7  · Will evaluate further with neck MRI  · Follow up at next scheduled appointment

## 2023-03-23 NOTE — PROGRESS NOTES
Assessment/Plan:      1  Cervical radiculopathy  Assessment & Plan:  Waxes and wanes  Improved with prednisone  Slightly worsened with PT  Strength is intact b/l, diminished biceps reflex on right, positive spurling on right  xr spine showed mild foraminal stenosis at C5-C7  Will evaluate further with neck MRI  Follow up at next scheduled appointment    Orders:  -     predniSONE 10 mg tablet; Take 4 tablets (40 mg total) by mouth daily for 2 days, THEN 3 tablets (30 mg total) daily for 2 days, THEN 2 tablets (20 mg total) daily for 2 days, THEN 1 tablet (10 mg total) daily for 2 days  -     methocarbamol (Robaxin-750) 750 mg tablet; Take 1 tablet (750 mg total) by mouth every 8 (eight) hours  -     MRI cervical spine wo contrast; Future; Expected date: 03/23/2023            Subjective:      Patient ID: Shila Quintero is a 62 y o  female  Initial eval 2/13/2023  Started on advil   Recurred and seen by urgent care 2/25/2023  2nd prednisone course and had neck xray  Patient was referral     2/25/2023: Cervical spinal x-ray: Degenerative disc disease, mild right C5-C6 and mild left C5-C7 foraminal stenosis    2 weeks of PT and symptoms are unchanged  HPI    The following portions of the patient's history were reviewed and updated as appropriate: allergies, current medications, past family history, past medical history, past social history, past surgical history and problem list     Review of Systems   Constitutional: Negative for activity change, chills, diaphoresis and fever  HENT: Negative for ear pain, hearing loss, postnasal drip, rhinorrhea, sinus pressure, sinus pain, sneezing and sore throat  Respiratory: Negative for cough, chest tightness, shortness of breath and wheezing  Cardiovascular: Negative for chest pain, palpitations and leg swelling  Gastrointestinal: Negative for abdominal pain, blood in stool, constipation, diarrhea, nausea and vomiting     Genitourinary: Negative for dysuria, frequency, hematuria and urgency  Musculoskeletal: Positive for neck pain and neck stiffness  Negative for arthralgias and myalgias  Neurological: Negative for dizziness, syncope, weakness, light-headedness, numbness and headaches  Objective:      /72 (BP Location: Left arm, Patient Position: Sitting, Cuff Size: Adult)   Pulse 99   Temp 97 6 °F (36 4 °C) (Temporal)   Wt 63 5 kg (140 lb)   LMP 07/03/2017   SpO2 98%   BMI 27 34 kg/m²          Physical Exam  Vitals reviewed  Constitutional:       General: She is not in acute distress  Appearance: She is well-developed  She is not diaphoretic  HENT:      Head: Normocephalic and atraumatic  Right Ear: External ear normal       Left Ear: External ear normal       Nose: Nose normal  No congestion  Mouth/Throat:      Mouth: Mucous membranes are moist       Pharynx: Oropharynx is clear  No oropharyngeal exudate  Eyes:      General: No scleral icterus  Right eye: No discharge  Left eye: No discharge  Conjunctiva/sclera: Conjunctivae normal       Pupils: Pupils are equal, round, and reactive to light  Neck:      Thyroid: No thyromegaly  Vascular: No JVD  Trachea: No tracheal deviation  Cardiovascular:      Rate and Rhythm: Normal rate and regular rhythm  Heart sounds: Normal heart sounds  No murmur heard  No friction rub  No gallop  Pulmonary:      Effort: Pulmonary effort is normal  No respiratory distress  Breath sounds: Normal breath sounds  No wheezing or rales  Chest:      Chest wall: No tenderness  Abdominal:      General: Bowel sounds are normal  There is no distension  Palpations: Abdomen is soft  Tenderness: There is no abdominal tenderness  There is no right CVA tenderness, left CVA tenderness, guarding or rebound  Musculoskeletal:         General: Normal range of motion  Cervical back: Normal range of motion and neck supple  Spasms present   No swelling, edema, deformity, erythema, signs of trauma, lacerations, rigidity, torticollis, tenderness, bony tenderness or crepitus  No pain with movement  Normal range of motion  Right lower leg: No edema  Left lower leg: No edema  Lymphadenopathy:      Cervical: No cervical adenopathy  Skin:     General: Skin is warm and dry  Neurological:      Mental Status: She is alert and oriented to person, place, and time  Mental status is at baseline  Sensory: Sensation is intact  Motor: No weakness  Deep Tendon Reflexes:      Reflex Scores:       Bicep reflexes are 1+ on the right side and 3+ on the left side  Psychiatric:         Mood and Affect: Mood normal          Behavior: Behavior normal          Thought Content:  Thought content normal          Judgment: Judgment normal

## 2023-03-27 ENCOUNTER — APPOINTMENT (OUTPATIENT)
Dept: PHYSICAL THERAPY | Facility: CLINIC | Age: 58
End: 2023-03-27

## 2023-03-30 ENCOUNTER — TELEPHONE (OUTPATIENT)
Dept: FAMILY MEDICINE CLINIC | Facility: CLINIC | Age: 58
End: 2023-03-30

## 2023-03-31 ENCOUNTER — OFFICE VISIT (OUTPATIENT)
Dept: PHYSICAL THERAPY | Facility: CLINIC | Age: 58
End: 2023-03-31

## 2023-03-31 DIAGNOSIS — M54.12 CERVICAL RADICULOPATHY: Primary | ICD-10-CM

## 2023-03-31 NOTE — PROGRESS NOTES
"Daily Note     Today's date: 3/31/2023  Patient name: Sammuel Epley  : 1965  MRN: [id number]  Referring provider: José Manuel Bhatti PT  Dx:   Encounter Diagnosis     ICD-10-CM    1  Cervical radiculopathy  M54 12                      Subjective: Patient states that her symptoms continue to decrease in the UE with cervical retractions but worsen with driving and any activities involving cervical protrusion  Objective: See treatment diary below      Assessment: Patient demonstrates continued centralization with cervical retractions and protrusion peripheralizes her symptoms  Majority of radicular symptoms are now near the elbow, opposed to the hand which are another sign of centralizing symptoms in the UE with cervical tractions  Progressed cervical retractions with DNF lift in supine with the prevention of protrusion and patient demonstrated       Plan: Continue per plan of care        Precautions: N/A      Manuals 3/8 3/17 3/31          R C5/C6 cervical opening glides  G4 G4          Manual cervical traction  G4 G4                                    Neuro Re-Ed                                                                                                        Ther Ex             UBE w/ postural correction  L1  4'           Supine cervical tractions 2x10 x5\" 2x10 x5\" 2x10 x5\"          Thoracic extension in chair 2x10 x10\" 2x10 x10\" 2x10 x5\"          R radial nerve glides  2x10 2x10 2x10          Cervical retraction w/ TB - pink  2x10 x3\" 2x10 x3\"          Supine DNF lift   2x5 x3\"                       TB row review             Overhead activity review                          Ther Activity                                       Gait Training                                       Modalities             Cervical traction  26#  10' 26#  10'                            "

## 2023-04-14 ENCOUNTER — APPOINTMENT (OUTPATIENT)
Dept: PHYSICAL THERAPY | Facility: CLINIC | Age: 58
End: 2023-04-14

## 2023-06-01 ENCOUNTER — ANNUAL EXAM (OUTPATIENT)
Dept: GYNECOLOGY | Facility: CLINIC | Age: 58
End: 2023-06-01

## 2023-06-01 VITALS
DIASTOLIC BLOOD PRESSURE: 74 MMHG | WEIGHT: 141.4 LBS | HEART RATE: 96 BPM | SYSTOLIC BLOOD PRESSURE: 134 MMHG | BODY MASS INDEX: 27.62 KG/M2

## 2023-06-01 DIAGNOSIS — Z79.890 HORMONE REPLACEMENT THERAPY (HRT): ICD-10-CM

## 2023-06-01 DIAGNOSIS — Z12.31 ENCOUNTER FOR SCREENING MAMMOGRAM FOR MALIGNANT NEOPLASM OF BREAST: ICD-10-CM

## 2023-06-01 DIAGNOSIS — D21.9 FIBROIDS: ICD-10-CM

## 2023-06-01 DIAGNOSIS — Z01.419 ENCOUNTER FOR GYNECOLOGICAL EXAMINATION WITHOUT ABNORMAL FINDING: Primary | ICD-10-CM

## 2023-06-01 PROCEDURE — G0145 SCR C/V CYTO,THINLAYER,RESCR: HCPCS | Performed by: OBSTETRICS & GYNECOLOGY

## 2023-06-01 NOTE — PROGRESS NOTES
Assessment/Plan:         Diagnoses and all orders for this visit:    Encounter for gynecological examination without abnormal finding  -     Liquid-based pap, screening    Encounter for screening mammogram for malignant neoplasm of breast  -     Mammo screening bilateral w 3d & cad; Future    Hormone replacement therapy (HRT); will attempt to taper off next year    Fibroids; stable and asymptomatic-follow        Subjective:      Patient ID: Elzbieta Lamas is a 62 y o  female  HPI patient presents for annual examination  She offers no complaints  She is on hormone replacement therapy  She has been on this for approximately 3 years  She has a known fibroid uterus  Denies any vaginal irritation, burning, discharge or bleeding  Denies any dysuria, hematuria urgency or urinary incontinence  No GI complaints  Colonoscopy age 48  Normal   Repeat at age 61  Osteoporosis screening via peripheral scan May 2022  0 9    The following portions of the patient's history were reviewed and updated as appropriate:   She  has a past medical history of Fibroids (1994)  She   Patient Active Problem List    Diagnosis Date Noted   • Cervical radiculopathy 03/23/2023   • Other chest pain 01/65/5129   • Folliculitis of nose 21/83/6133   • Recurrent herpes simplex 04/21/2022   • Fungal nail infection 05/18/2021   • Difficulty swallowing 11/07/2017   • Dysphagia 11/07/2017   • Allergic rhinitis 10/12/2017   • Uterine leiomyoma 03/22/2016   • Backache 01/29/2016   • 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   • Gastroesophageal reflux disease 09/18/2015   • Epidermoid cyst of skin 06/03/2015   • Anisocoria 11/08/2011     She  has a past surgical history that includes Myomectomy; [place] tooth extraction; Esophagogastroduodenoscopy; and Other surgical history  Her family history includes Heart attack in her family;  Hypertension in her mother; Kidney failure in her maternal uncle; Lung cancer (age of onset: 72) in her father; Lymphoma in her maternal grandmother; No Known Problems in her maternal aunt, maternal aunt, maternal aunt, maternal aunt, paternal aunt, paternal aunt, paternal aunt, and sister; Stroke in her family  She  reports that she has never smoked  She has never used smokeless tobacco  She reports current alcohol use  She reports that she does not use drugs  Current Outpatient Medications   Medication Sig Dispense Refill   • Ascorbic Acid (RALPH-C PO) Take by mouth       • cholecalciferol (VITAMIN D3) 1,000 units tablet Take 1,000 Units by mouth daily      • estradiol-norethindrone (COMBIPATCH) 0 05-0 14 MG/DAY Place 1 patch on the skin 2 (two) times a week 24 patch 3   • hydrocortisone 2 5 % cream Apply topically 3 (three) times a day 15 g 2   • ibuprofen (MOTRIN) 200 mg tablet Take 4 tablets (800 mg total) by mouth every 8 (eight) hours as needed for mild pain With food 30 tablet 0   • KLS AllerClear D-12HR 5-120 MG per tablet TAKE ONE TABLET BY MOUTH TWICE DAILY 180 tablet 0   • Multiple Vitamins-Minerals (MULTIVITAMIN ADULT PO) every 24 hours     • Omega-3 Fatty Acids (fish oil) 1,000 mg Take by mouth     • omeprazole (PriLOSEC) 40 MG capsule TAKE 1 CAPSULE BY MOUTH EVERY DAY BEFORE BREAKFAST 90 capsule 5   • triamcinolone (KENALOG) 0 1 % cream Apply topically to both hands twice daily for up to 14 days at a time as needed for eczema flare ups on hands  Do not put on face, groin or body folds   80 g 2   • Flowflex COVID-19 Ag Home Test KIT  (Patient not taking: Reported on 6/1/2023)     • loratadine-pseudoephedrine (CLARITIN-D 24-HOUR)  mg per 24 hr tablet Take 1 tablet by mouth daily (Patient not taking: Reported on 6/1/2023) 90 tablet 3   • methocarbamol (Robaxin-750) 750 mg tablet Take 1 tablet (750 mg total) by mouth every 8 (eight) hours (Patient not taking: Reported on 6/1/2023) 30 tablet 0   • valACYclovir (VALTREX) 1,000 mg tablet One bid prn herpes outbreak (Patient not taking: Reported on 6/1/2023) 30 tablet 6 No current facility-administered medications for this visit  Current Outpatient Medications on File Prior to Visit   Medication Sig   • Ascorbic Acid (RALPH-C PO) Take by mouth     • cholecalciferol (VITAMIN D3) 1,000 units tablet Take 1,000 Units by mouth daily    • estradiol-norethindrone (COMBIPATCH) 0 05-0 14 MG/DAY Place 1 patch on the skin 2 (two) times a week   • hydrocortisone 2 5 % cream Apply topically 3 (three) times a day   • ibuprofen (MOTRIN) 200 mg tablet Take 4 tablets (800 mg total) by mouth every 8 (eight) hours as needed for mild pain With food   • KLS AllerClear D-12HR 5-120 MG per tablet TAKE ONE TABLET BY MOUTH TWICE DAILY   • Multiple Vitamins-Minerals (MULTIVITAMIN ADULT PO) every 24 hours   • Omega-3 Fatty Acids (fish oil) 1,000 mg Take by mouth   • omeprazole (PriLOSEC) 40 MG capsule TAKE 1 CAPSULE BY MOUTH EVERY DAY BEFORE BREAKFAST   • triamcinolone (KENALOG) 0 1 % cream Apply topically to both hands twice daily for up to 14 days at a time as needed for eczema flare ups on hands  Do not put on face, groin or body folds  • Flowflex COVID-19 Ag Home Test KIT  (Patient not taking: Reported on 6/1/2023)   • loratadine-pseudoephedrine (CLARITIN-D 24-HOUR)  mg per 24 hr tablet Take 1 tablet by mouth daily (Patient not taking: Reported on 6/1/2023)   • methocarbamol (Robaxin-750) 750 mg tablet Take 1 tablet (750 mg total) by mouth every 8 (eight) hours (Patient not taking: Reported on 6/1/2023)   • valACYclovir (VALTREX) 1,000 mg tablet One bid prn herpes outbreak (Patient not taking: Reported on 6/1/2023)     No current facility-administered medications on file prior to visit  She is allergic to cefuroxime, cetirizine, fexofenadine, penicillins, sulfa antibiotics, and sulfanilamide       Review of Systems   Constitutional: Negative  HENT: Negative for sore throat and trouble swallowing  Gastrointestinal: Negative  Genitourinary: Negative            Objective:      BP 134/74   Pulse 96   Wt 64 1 kg (141 lb 6 4 oz)   LMP 07/03/2017   BMI 27 62 kg/m²          Physical Exam  Vitals reviewed  Constitutional:       Appearance: Normal appearance  Neck:      Comments: Having neck pain with radiculopathy to right extremity  She has had PT  MRI has been ordered  Cardiovascular:      Rate and Rhythm: Normal rate and regular rhythm  Pulses: Normal pulses  Heart sounds: Normal heart sounds  No murmur heard  Pulmonary:      Effort: Pulmonary effort is normal  No respiratory distress  Breath sounds: Normal breath sounds  Chest:   Breasts:     Right: No swelling, bleeding, inverted nipple, mass, nipple discharge, skin change or tenderness  Left: No swelling, bleeding, inverted nipple, mass, nipple discharge, skin change or tenderness  Abdominal:      General: There is no distension  Palpations: Abdomen is soft  There is no mass  Tenderness: There is no abdominal tenderness  There is no guarding or rebound  Hernia: No hernia is present  There is no hernia in the left inguinal area or right inguinal area  Genitourinary:     General: Normal vulva  Labia:         Right: No rash, tenderness or lesion  Left: No rash, tenderness or lesion  Vagina: Normal       Cervix: Normal       Uterus: Enlarged (12 wk size)  Not deviated, not fixed, not tender and no uterine prolapse  Adnexa:         Right: No mass, tenderness or fullness  Left: No mass, tenderness or fullness  Musculoskeletal:      Cervical back: Normal range of motion and neck supple  No tenderness  Lymphadenopathy:      Cervical: No cervical adenopathy  Upper Body:      Right upper body: No supraclavicular, axillary or pectoral adenopathy  Left upper body: No supraclavicular, axillary or pectoral adenopathy  Lower Body: No right inguinal adenopathy  No left inguinal adenopathy  Neurological:      Mental Status: She is alert

## 2023-06-11 LAB
LAB AP GYN PRIMARY INTERPRETATION: NORMAL
Lab: NORMAL

## 2023-06-13 ENCOUNTER — TELEPHONE (OUTPATIENT)
Dept: FAMILY MEDICINE CLINIC | Facility: CLINIC | Age: 58
End: 2023-06-13

## 2023-06-14 NOTE — TELEPHONE ENCOUNTER
I don't see results of her MRI. Did she have it completed? I recommend she take breaks from the computer ever 20 minutes to stretch her neck and her wrists.

## 2023-06-15 DIAGNOSIS — M54.12 CERVICAL RADICULOPATHY: Primary | ICD-10-CM

## 2023-06-15 NOTE — TELEPHONE ENCOUNTER
Let patient know that she does have arthritis and degenerative disc disease in her neck which is contributing to her symptoms. I will refer her to a specialist for further evaluation and treatment.

## 2023-06-16 ENCOUNTER — TELEPHONE (OUTPATIENT)
Dept: FAMILY MEDICINE CLINIC | Facility: CLINIC | Age: 58
End: 2023-06-16

## 2023-06-22 ENCOUNTER — RA CDI HCC (OUTPATIENT)
Dept: OTHER | Facility: HOSPITAL | Age: 58
End: 2023-06-22

## 2023-06-22 NOTE — PROGRESS NOTES
[facility] 75  coding opportunities       Chart reviewed, no opportunity found: CHART REVIEWED, NO OPPORTUNITY FOUND        Patients Insurance        Commercial Insurance: Jorge Ybarra

## 2023-06-29 ENCOUNTER — OFFICE VISIT (OUTPATIENT)
Dept: FAMILY MEDICINE CLINIC | Facility: CLINIC | Age: 58
End: 2023-06-29
Payer: COMMERCIAL

## 2023-06-29 VITALS
WEIGHT: 140.8 LBS | HEIGHT: 60 IN | HEART RATE: 93 BPM | OXYGEN SATURATION: 98 % | TEMPERATURE: 97.6 F | BODY MASS INDEX: 27.64 KG/M2 | DIASTOLIC BLOOD PRESSURE: 70 MMHG | SYSTOLIC BLOOD PRESSURE: 120 MMHG

## 2023-06-29 DIAGNOSIS — K21.9 GASTROESOPHAGEAL REFLUX DISEASE WITHOUT ESOPHAGITIS: ICD-10-CM

## 2023-06-29 DIAGNOSIS — M54.12 CERVICAL RADICULOPATHY: ICD-10-CM

## 2023-06-29 DIAGNOSIS — Z00.00 ANNUAL PHYSICAL EXAM: Primary | ICD-10-CM

## 2023-06-29 DIAGNOSIS — T78.40XA ALLERGIC REACTION, INITIAL ENCOUNTER: ICD-10-CM

## 2023-06-29 DIAGNOSIS — J30.9 ALLERGIC RHINITIS, UNSPECIFIED SEASONALITY, UNSPECIFIED TRIGGER: ICD-10-CM

## 2023-06-29 PROBLEM — L73.9 FOLLICULITIS OF NOSE: Status: RESOLVED | Noted: 2022-07-13 | Resolved: 2023-06-29

## 2023-06-29 PROBLEM — R13.10 DIFFICULTY SWALLOWING: Status: RESOLVED | Noted: 2017-11-07 | Resolved: 2023-06-29

## 2023-06-29 PROBLEM — R13.10 DYSPHAGIA: Status: RESOLVED | Noted: 2017-11-07 | Resolved: 2023-06-29

## 2023-06-29 PROBLEM — R07.89 OTHER CHEST PAIN: Status: RESOLVED | Noted: 2022-10-12 | Resolved: 2023-06-29

## 2023-06-29 NOTE — PROGRESS NOTES
[address]    NAME: Susu Joseph  AGE: 62 y o  SEX: female  : 1965     DATE: 2023     Assessment and Plan:     Problem List Items Addressed This Visit        Digestive    Gastroesophageal reflux disease     Controlled with omeprazole as needed            Respiratory    Allergic rhinitis     Remains on generic Claritin-D            Nervous and Auditory    Cervical radiculopathy     Waxes and wanes  Worse when sitting at the computer  Has symptoms in her right hand  Symptoms have improved since her last visit 3/2023  MRI shows degenerative disc disease and mild bulging  Patient is tolerating physical therapy but has reached a plateau  Discuss further evaluation including pain management referral   Patient willing to hold off for now unless symptoms worsen              Other    Allergic reaction     Patient developed rash with itch after eating cherries  Symptoms self resolved  Denies any swelling of the mouth or throat, or shortness of breath  Similar symptoms occur when she eats peaches  Could be secondary to oral allergy syndrome  Offered referral to allergy immunology, will hold off for now        Other Visit Diagnoses     Annual physical exam    -  Primary          Immunizations and preventive care screenings were discussed with patient today  Appropriate education was printed on patient's after visit summary  Counseling:  Alcohol/drug use: discussed moderation in alcohol intake, the recommendations for healthy alcohol use, and avoidance of illicit drug use  Dental Health: discussed importance of regular tooth brushing, flossing, and dental visits  Injury prevention: discussed safety/seat belts, safety helmets, smoke detectors, carbon dioxide detectors, and smoking near bedding or upholstery    Sexual health: discussed sexually transmitted diseases, partner selection, use of condoms, avoidance of unintended pregnancy, and contraceptive alternatives  Exercise: the importance of regular exercise/physical activity was discussed  Recommend exercise 3-5 times per week for at least 30 minutes  Return in about 1 year (around 6/29/2024) for Annual physical      Chief Complaint:     Chief Complaint   Patient presents with   • Physical Exam      History of Present Illness:     Adult Annual Physical   Patient here for a comprehensive physical exam  She ate some cherries and developed a rash and pruritus  Denies swelling of mouth or shortness of breath  Diet and Physical Activity  Diet/Nutrition: well balanced diet and consuming 3-5 servings of fruits/vegetables daily  Exercise: moderate cardiovascular exercise, strength training exercises, 5-7 times a week on average and 30-60 minutes on average  Depression Screening  PHQ-2/9 Depression Screening         General Health  Sleep: sleeps well and gets 4-6 hours of sleep on average  Hearing: normal - bilateral   Vision: goes for regular eye exams, most recent eye exam <1 year ago and wears glasses  Dental: regular dental visits and brushes teeth twice daily  /GYN Health  Patient is: postmenopausal       Review of Systems:     Review of Systems   Constitutional: Negative for activity change, chills, diaphoresis and fever  HENT: Negative for ear pain, hearing loss, postnasal drip, rhinorrhea, sinus pressure, sinus pain, sneezing and sore throat  Respiratory: Negative for cough, chest tightness, shortness of breath and wheezing  Cardiovascular: Negative for chest pain, palpitations and leg swelling  Gastrointestinal: Negative for abdominal pain, blood in stool, constipation, diarrhea, nausea and vomiting  Genitourinary: Negative for dysuria, frequency, hematuria and urgency  Musculoskeletal: Negative for arthralgias and myalgias  Neurological: Negative for dizziness, syncope, weakness, light-headedness, numbness and headaches  Past Medical History:     Past Medical History:   Diagnosis Date   • Fibroids 1994    UTERUS      Past Surgical History:     Past Surgical History:   Procedure Laterality Date   • ESOPHAGOGASTRODUODENOSCOPY     • MYOMECTOMY     • OTHER SURGICAL HISTORY      fatty tissue removal   • WISDOM TOOTH EXTRACTION        Social History:     Social History     Socioeconomic History   • Marital status: Single     Spouse name: None   • Number of children: None   • Years of education: None   • Highest education level: None   Occupational History   • None   Tobacco Use   • Smoking status: Never   • Smokeless tobacco: Never   Vaping Use   • Vaping Use: Never used   Substance and Sexual Activity   • Alcohol use:  Yes   • Drug use: No   • Sexual activity: Not Currently     Birth control/protection: Post-menopausal   Other Topics Concern   • None   Social History Narrative   • None     Social Determinants of Health     Financial Resource Strain: Not on file   Food Insecurity: Not on file   Transportation Needs: Not on file   Physical Activity: Not on file   Stress: Not on file   Social Connections: Not on file   Intimate Partner Violence: Not on file   Housing Stability: Not on file      Family History:     Family History   Problem Relation Age of Onset   • Hypertension Mother    • Lung cancer Father 72   • No Known Problems Sister    • Lymphoma Maternal Grandmother         age unknown   • No Known Problems Maternal Aunt    • No Known Problems Maternal Aunt    • No Known Problems Maternal Aunt    • No Known Problems Maternal Aunt    • Kidney failure Maternal Uncle    • No Known Problems Paternal Aunt    • No Known Problems Paternal Aunt    • No Known Problems Paternal Aunt    • Heart attack Family    • Stroke Family       Current Medications:     Current Outpatient Medications   Medication Sig Dispense Refill   • Ascorbic Acid (RALPH-C PO) Take by mouth       • cholecalciferol (VITAMIN D3) 1,000 units tablet Take 1,000 Units by mouth daily      • estradiol-norethindrone (COMBIPATCH) 0 05-0 14 MG/DAY Place 1 patch on the skin 2 (two) times a week 24 patch 3   • hydrocortisone 2 5 % cream Apply topically 3 (three) times a day 15 g 2   • ibuprofen (MOTRIN) 200 mg tablet Take 4 tablets (800 mg total) by mouth every 8 (eight) hours as needed for mild pain With food 30 tablet 0   • KLS AllerClear D-12HR 5-120 MG per tablet TAKE ONE TABLET BY MOUTH TWICE DAILY 180 tablet 0   • Multiple Vitamins-Minerals (MULTIVITAMIN ADULT PO) every 24 hours     • Omega-3 Fatty Acids (fish oil) 1,000 mg Take by mouth     • omeprazole (PriLOSEC) 40 MG capsule TAKE 1 CAPSULE BY MOUTH EVERY DAY BEFORE BREAKFAST 90 capsule 5   • triamcinolone (KENALOG) 0 1 % cream Apply topically to both hands twice daily for up to 14 days at a time as needed for eczema flare ups on hands  Do not put on face, groin or body folds  80 g 2   • valACYclovir (VALTREX) 1,000 mg tablet One bid prn herpes outbreak 30 tablet 6   • Flowflex COVID-19 Ag Home Test KIT  (Patient not taking: Reported on 6/1/2023)       No current facility-administered medications for this visit  Allergies: Allergies   Allergen Reactions   • Cefuroxime      Other reaction(s): Hives   • Cetirizine      Other reaction(s): sleepy  Other reaction(s): sleepy   • Fexofenadine      Other reaction(s): palpitations  Other reaction(s): palpitations   • Penicillins    • Sulfa Antibiotics    • Sulfanilamide      Other reaction(s): Hives      Physical Exam:     /70 (BP Location: Left arm, Patient Position: Sitting, Cuff Size: Standard)   Pulse 93   Temp 97 6 °F (36 4 °C) (Temporal)   Ht 5' (1 524 m)   Wt 63 9 kg (140 lb 12 8 oz)   LMP 07/03/2017   SpO2 98%   BMI 27 50 kg/m²     Physical Exam  Constitutional:       General: She is not in acute distress  Appearance: She is well-developed  She is not diaphoretic  HENT:      Head: Normocephalic and atraumatic        Mouth/Throat:      Pharynx: No oropharyngeal exudate  Eyes:      Pupils: Pupils are equal, round, and reactive to light  Neck:      Vascular: No JVD  Cardiovascular:      Rate and Rhythm: Normal rate and regular rhythm  Heart sounds: Normal heart sounds  No murmur heard  No friction rub  No gallop  Pulmonary:      Effort: Pulmonary effort is normal  No respiratory distress  Breath sounds: Normal breath sounds  No wheezing or rales  Chest:      Chest wall: No tenderness  Abdominal:      General: Bowel sounds are normal  There is no distension  Palpations: Abdomen is soft  Tenderness: There is no abdominal tenderness  There is no guarding or rebound  Musculoskeletal:         General: No tenderness  Normal range of motion  Lymphadenopathy:      Cervical: No cervical adenopathy  Skin:     General: Skin is warm and dry  Neurological:      Mental Status: She is alert and oriented to person, place, and time  Cranial Nerves: No cranial nerve deficit     Psychiatric:         Behavior: Behavior normal           Patrice Gross, DO  [address]

## 2023-06-30 ENCOUNTER — HOSPITAL ENCOUNTER (OUTPATIENT)
Dept: RADIOLOGY | Facility: IMAGING CENTER | Age: 58
End: 2023-06-30
Payer: COMMERCIAL

## 2023-06-30 VITALS — BODY MASS INDEX: 26.5 KG/M2 | HEIGHT: 60 IN | WEIGHT: 135 LBS

## 2023-06-30 DIAGNOSIS — Z12.31 ENCOUNTER FOR SCREENING MAMMOGRAM FOR MALIGNANT NEOPLASM OF BREAST: ICD-10-CM

## 2023-06-30 PROBLEM — T78.40XA ALLERGIC REACTION: Status: ACTIVE | Noted: 2023-06-30

## 2023-06-30 PROCEDURE — 77067 SCR MAMMO BI INCL CAD: CPT

## 2023-06-30 PROCEDURE — 77063 BREAST TOMOSYNTHESIS BI: CPT

## 2023-06-30 NOTE — ASSESSMENT & PLAN NOTE
Waxes and wanes  · Worse when sitting at the computer  · Has symptoms in her right hand  · Symptoms have improved since her last visit 3/2023  · MRI shows degenerative disc disease and mild bulging  · Patient is tolerating physical therapy but has reached a plateau  · Discuss further evaluation including pain management referral   Patient willing to hold off for now unless symptoms worsen

## 2023-06-30 NOTE — ASSESSMENT & PLAN NOTE
· Patient developed rash with itch after eating cherries  · Symptoms self resolved  · Denies any swelling of the mouth or throat, or shortness of breath  · Similar symptoms occur when she eats peaches  · Could be secondary to oral allergy syndrome  · Offered referral to allergy immunology, will hold off for now

## 2023-10-03 DIAGNOSIS — J30.9 ALLERGIC RHINITIS, UNSPECIFIED SEASONALITY, UNSPECIFIED TRIGGER: ICD-10-CM

## 2023-10-04 ENCOUNTER — TELEPHONE (OUTPATIENT)
Dept: FAMILY MEDICINE CLINIC | Facility: CLINIC | Age: 58
End: 2023-10-04

## 2023-10-04 RX ORDER — LORATADINE, PSEUDOEPHEDRINE 5; 120 MG/1; MG/1
1 TABLET, EXTENDED RELEASE ORAL 2 TIMES DAILY
Qty: 180 TABLET | Refills: 0 | Status: SHIPPED | OUTPATIENT
Start: 2023-10-04

## 2023-10-05 ENCOUNTER — TREATMENT (OUTPATIENT)
Dept: FAMILY MEDICINE CLINIC | Facility: CLINIC | Age: 58
End: 2023-10-05

## 2023-10-05 ENCOUNTER — TRANSCRIBE ORDERS (OUTPATIENT)
Dept: FAMILY MEDICINE CLINIC | Facility: CLINIC | Age: 58
End: 2023-10-05

## 2023-10-05 DIAGNOSIS — J30.9 ALLERGIC RHINITIS, UNSPECIFIED SEASONALITY, UNSPECIFIED TRIGGER: ICD-10-CM

## 2023-10-30 NOTE — TELEPHONE ENCOUNTER
Left message for pt  to call for PT evaluation referral done a dn numbers given
Needs referral to PT   Continue estrace
Patient called stating she is still in pain using the Estrace as advised  Would you like her to come in for an office visit? Please advise on next step 
Able to adapt/Expressive of emotions

## 2023-11-27 ENCOUNTER — TELEPHONE (OUTPATIENT)
Dept: GYNECOLOGY | Facility: CLINIC | Age: 58
End: 2023-11-27

## 2023-11-27 NOTE — TELEPHONE ENCOUNTER
Pt was previously on HRT and was using patch but plan was to slowly come off of patch. Pt is now completely done with estrogen patch but is having menopausal symptoms including hot flashes. She would like to know if there are other options for her.

## 2023-12-07 ENCOUNTER — OFFICE VISIT (OUTPATIENT)
Dept: GYNECOLOGY | Facility: CLINIC | Age: 58
End: 2023-12-07
Payer: COMMERCIAL

## 2023-12-07 ENCOUNTER — APPOINTMENT (OUTPATIENT)
Dept: LAB | Facility: MEDICAL CENTER | Age: 58
End: 2023-12-07
Payer: COMMERCIAL

## 2023-12-07 VITALS
HEIGHT: 60 IN | HEART RATE: 96 BPM | DIASTOLIC BLOOD PRESSURE: 62 MMHG | WEIGHT: 137 LBS | SYSTOLIC BLOOD PRESSURE: 130 MMHG | BODY MASS INDEX: 26.9 KG/M2

## 2023-12-07 DIAGNOSIS — Z79.899 HIGH RISK MEDICATION USE: ICD-10-CM

## 2023-12-07 DIAGNOSIS — N95.1 VASOMOTOR SYMPTOMS DUE TO MENOPAUSE: Primary | ICD-10-CM

## 2023-12-07 LAB
ALBUMIN SERPL BCP-MCNC: 4.5 G/DL (ref 3.5–5)
ALP SERPL-CCNC: 58 U/L (ref 34–104)
ALT SERPL W P-5'-P-CCNC: 47 U/L (ref 7–52)
AST SERPL W P-5'-P-CCNC: 34 U/L (ref 13–39)
BILIRUB DIRECT SERPL-MCNC: 0.06 MG/DL (ref 0–0.2)
BILIRUB SERPL-MCNC: 0.45 MG/DL (ref 0.2–1)
PROT SERPL-MCNC: 8 G/DL (ref 6.4–8.4)

## 2023-12-07 PROCEDURE — 80076 HEPATIC FUNCTION PANEL: CPT

## 2023-12-07 PROCEDURE — 36415 COLL VENOUS BLD VENIPUNCTURE: CPT

## 2023-12-07 PROCEDURE — 99213 OFFICE O/P EST LOW 20 MIN: CPT | Performed by: OBSTETRICS & GYNECOLOGY

## 2023-12-07 NOTE — PROGRESS NOTES
Patient has been off of HRT for few months and has been experiencing increasing and intolerable vasomotor symptoms. Presents to discuss options we discussed her resumption of hormonal replacement therapy, discussed herbal supplementation with the understanding that these are not FDA controlled and I cannot verify their safety. Discussed Veozah    Impression: Vasomotor symptoms    Plan: Patient would like to try Domo Ivan. Will check liver enzymes prior to starting on this medication and then again at 3 months 6 months and 9 months.

## 2023-12-08 ENCOUNTER — TELEPHONE (OUTPATIENT)
Dept: GYNECOLOGY | Facility: CLINIC | Age: 58
End: 2023-12-08

## 2023-12-08 DIAGNOSIS — Z79.899 HIGH RISK MEDICATION USE: Primary | ICD-10-CM

## 2023-12-08 NOTE — TELEPHONE ENCOUNTER
LM for Pt. To start [address] blood work was normal   Lab slip mailed out for Pt.  Today for 3,6,9 months

## 2023-12-08 NOTE — TELEPHONE ENCOUNTER
----- Message from Nya Wayne DO sent at 12/8/2023  7:56 AM EST -----  Labs normal. OK to start [address].  Recheck labs 3,6 and 9 months

## 2023-12-11 ENCOUNTER — TELEPHONE (OUTPATIENT)
Dept: GYNECOLOGY | Facility: CLINIC | Age: 58
End: 2023-12-11

## 2023-12-15 ENCOUNTER — TELEPHONE (OUTPATIENT)
Dept: GYNECOLOGY | Facility: CLINIC | Age: 58
End: 2023-12-15

## 2023-12-15 NOTE — TELEPHONE ENCOUNTER
Patient called inquiring about the [employer] Medal. [email or web address] sent us a denial letter. Patient had gone onto the website and even spoke to someone and they told her she was ok, when pt went to [facility] they told her it had to go through her insurance. We are waiting to hear back from our representative Will Wood to see if he can shed any light on the subject or help us.

## 2023-12-18 ENCOUNTER — TELEPHONE (OUTPATIENT)
Dept: GYNECOLOGY | Facility: CLINIC | Age: 58
End: 2023-12-18

## 2023-12-18 NOTE — TELEPHONE ENCOUNTER
[facility] called and states Her insurance will pay for different medications but Calin needs to have a preauth  And a letter of why this one is needed.  Please contact Pt. When this is taking care of

## 2023-12-19 DIAGNOSIS — N95.1 VASOMOTOR SYMPTOMS DUE TO MENOPAUSE: ICD-10-CM

## 2023-12-19 NOTE — TELEPHONE ENCOUNTER
Spoke with Shabbir with [facility] [phone]. He does not understand why [facility] told patient the script needs to go through her insurance when she had the savings card. They told the patient they wouldn't get paid. Shabbir our rep asked if we could send the script to another pharmacy and try the savings card. Patient suggested [facility] in [place]. Shabbir will be visiting [place] [facility] to look into this.

## 2023-12-20 ENCOUNTER — TELEPHONE (OUTPATIENT)
Dept: GYNECOLOGY | Facility: CLINIC | Age: 58
End: 2023-12-20

## 2023-12-20 NOTE — TELEPHONE ENCOUNTER
Patient called again stating that she went to [facility] to  Veozah and was told that it is in short supply.  And it is out of stock everywhere.  She is frustrated at the whole process of getting this medication. She states she even contacted .  Are there other options for her?

## 2023-12-27 ENCOUNTER — TELEPHONE (OUTPATIENT)
Dept: GYNECOLOGY | Facility: CLINIC | Age: 58
End: 2023-12-27

## 2023-12-27 NOTE — TELEPHONE ENCOUNTER
Patient called again regarding script for Veozah.  She is wondering if there is a substitue.  Patient is frustrated.  Last note from Dr Daniel states someone should contact Shabbir for assistance.  Please call rep and update patient.

## 2023-12-27 NOTE — TELEPHONE ENCOUNTER
LM on Veozjeramie Shea's VM to CB explaining patient's frustration with not being able to get the prescription filled. Will touch base with patient once Aaron calls back.

## 2023-12-29 ENCOUNTER — TELEPHONE (OUTPATIENT)
Dept: GYNECOLOGY | Facility: CLINIC | Age: 58
End: 2023-12-29

## 2023-12-29 NOTE — TELEPHONE ENCOUNTER
I CB patient and  she is still having trouble getting her Veozah. She said [employer] told her her Savings Card was denied even though when she registered online it stated she was validated and ready to use it. She was told if her insurance doesn't cover it, the savings card wont work. Patient would like to know what is going on with this whole process.  Shabbir from [facility]  [phone] is trying to figure out where the disconnect is. He will be reachable Tuesday or Wednesday of next week.

## 2024-01-02 ENCOUNTER — DOCUMENTATION (OUTPATIENT)
Dept: GYNECOLOGY | Facility: CLINIC | Age: 59
End: 2024-01-02

## 2024-01-02 NOTE — PROGRESS NOTES
Tried auth again  for the Veozah, and it has been approved  may of had  a change in her plan auth good 1/2/24/ until 1/2/2025 I called patient left her a message and also called [facility] to let them know auth number and dates.

## 2024-01-23 ENCOUNTER — TELEPHONE (OUTPATIENT)
Dept: GYNECOLOGY | Facility: CLINIC | Age: 59
End: 2024-01-23

## 2024-01-23 NOTE — TELEPHONE ENCOUNTER
Patient called and states Veozah is causing sleeplessness, which she read could be a side effect.  And she also states she is still having hot flashes.  Any recommendations?

## 2024-01-23 NOTE — TELEPHONE ENCOUNTER
Patient returned call about making appt with Dr ALVARADO to discuss Teresa,  She seems frustrated.  She states she will call back next week when Dr ALVARADO returns.

## 2024-01-30 ENCOUNTER — TELEPHONE (OUTPATIENT)
Dept: GYNECOLOGY | Facility: CLINIC | Age: 59
End: 2024-01-30

## 2024-01-30 NOTE — TELEPHONE ENCOUNTER
Patient called again regarding Veozah.  She claims symptoms are getting better.  Switched Claritin D to morning and hot flashes are improving.  Continuing to take at this time.

## 2024-02-05 ENCOUNTER — TELEPHONE (OUTPATIENT)
Dept: GYNECOLOGY | Facility: CLINIC | Age: 59
End: 2024-02-05

## 2024-02-05 NOTE — TELEPHONE ENCOUNTER
Patient called again regarding veozah.  She states her insurance will now cover BUT there is a supply shortage and pharmacy does not know when it will be back in stock.  Patient is becoming increasingly frustrated.  She is wondering if there any other options she can try?  Please advise

## 2024-02-07 DIAGNOSIS — Z79.890 HORMONE REPLACEMENT THERAPY (HRT): ICD-10-CM

## 2024-02-20 ENCOUNTER — OFFICE VISIT (OUTPATIENT)
Dept: URGENT CARE | Facility: MEDICAL CENTER | Age: 59
End: 2024-02-20
Payer: COMMERCIAL

## 2024-02-20 VITALS
TEMPERATURE: 96.7 F | HEART RATE: 99 BPM | SYSTOLIC BLOOD PRESSURE: 129 MMHG | OXYGEN SATURATION: 100 % | RESPIRATION RATE: 18 BRPM | DIASTOLIC BLOOD PRESSURE: 82 MMHG

## 2024-02-20 DIAGNOSIS — S05.01XA ABRASION OF RIGHT CORNEA, INITIAL ENCOUNTER: ICD-10-CM

## 2024-02-20 DIAGNOSIS — H57.11 ACUTE RIGHT EYE PAIN: Primary | ICD-10-CM

## 2024-02-20 PROCEDURE — 99213 OFFICE O/P EST LOW 20 MIN: CPT | Performed by: FAMILY MEDICINE

## 2024-02-20 RX ORDER — TOBRAMYCIN 3 MG/ML
1 SOLUTION/ DROPS OPHTHALMIC
Qty: 1.3 ML | Refills: 0 | Status: SHIPPED | OUTPATIENT
Start: 2024-02-20 | End: 2024-02-25

## 2024-02-20 RX ORDER — TETRACAINE HYDROCHLORIDE 5 MG/ML
2 SOLUTION OPHTHALMIC ONCE
Status: COMPLETED | OUTPATIENT
Start: 2024-02-20 | End: 2024-02-20

## 2024-02-20 RX ADMIN — TETRACAINE HYDROCHLORIDE 2 DROP: 5 SOLUTION OPHTHALMIC at 17:10

## 2024-02-20 NOTE — PROGRESS NOTES
"  [facility]        NAME: Trinity Parra is a 59 y.o. female  : 1965    MRN: [id number]  DATE: 2024  TIME: 5:45 PM    Assessment and Plan   Acute right eye pain [H57.11]  1. Acute right eye pain  tetracaine 0.5 % ophthalmic solution 2 drop    fluorescein sodium sterile 1 mg ophthalmic strip 1 strip      2. Abrasion of right cornea, initial encounter  tobramycin (TOBREX) 0.3 % SOLN    Ambulatory Referral to Ophthalmology            Patient Instructions       Follow up with PCP in 3-5 days.  Proceed to  ER if symptoms worsen.    Chief Complaint     Chief Complaint   Patient presents with    Eye Pain     Pt states  she started with redness in right eye but has been progressively getting worse.   \"I feel like something in it.\"  The right eye is reddened denies pruritus.  She has attempted eye drops.           History of Present Illness       59-year-old female here today with complaint of redness in the right eye and increased tearing over the last 2 days.  Denies any recent trauma.  Describes increased tearing but denies any purulent discharge.  She is unsure about every time she blinks her right eye she feels like something is in her eye.  She has been using moisturizing drops with little improvement.  She does not wear contact lens.  Upon further questioning, she informs me she wore some mascara 2 days ago which she does not typically do on a daily basis.  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  She normally wears glasses.  Denies eye pain.        Review of Systems   Review of Systems   Constitutional: Negative.    Eyes:  Positive for redness and visual disturbance. Negative for discharge.         Current Medications       Current Outpatient Medications:     Ascorbic Acid (RALPH-C PO), Take by mouth  , Disp: , Rfl:     cholecalciferol (VITAMIN D3) 1,000 units tablet, Take 1,000 Units by mouth daily " , Disp: , Rfl:     estradiol-norethindrone (COMBIPATCH) 0.05-0.14 MG/DAY, Place 1 patch on the skin 2 (two) times a week, Disp: 24 patch, Rfl: 3    ibuprofen (MOTRIN) 200 mg tablet, Take 4 tablets (800 mg total) by mouth every 8 (eight) hours as needed for mild pain With food, Disp: 30 tablet, Rfl: 0    loratadine-pseudoephedrine (CLARITIN-D 24-HOUR)  mg per 24 hr tablet, Take 1 tablet by mouth daily, Disp: 90 tablet, Rfl: 3    Multiple Vitamins-Minerals (MULTIVITAMIN ADULT PO), every 24 hours, Disp: , Rfl:     Omega-3 Fatty Acids (fish oil) 1,000 mg, Take by mouth, Disp: , Rfl:     omeprazole (PriLOSEC) 40 MG capsule, TAKE 1 CAPSULE BY MOUTH EVERY DAY BEFORE BREAKFAST, Disp: 90 capsule, Rfl: 5    tobramycin (TOBREX) 0.3 % SOLN, Administer 1 drop to the right eye every 4 (four) hours while awake for 5 days, Disp: 1.3 mL, Rfl: 0    valACYclovir (VALTREX) 1,000 mg tablet, One bid prn herpes outbreak, Disp: 30 tablet, Rfl: 6    fezolinetant (Veozah) tablet, Take 1 tablet (45 mg total) by mouth daily (Patient not taking: Reported on 2/20/2024), Disp: 90 tablet, Rfl: 3    Flowflex COVID-19 Ag Home Test KIT, , Disp: , Rfl:     hydrocortisone 2.5 % cream, Apply topically 3 (three) times a day (Patient not taking: Reported on 2/20/2024), Disp: 15 g, Rfl: 2    loratadine-pseudoephedrine (KLS AllerClear D-12HR) 5-120 mg per tablet, Take 1 tablet by mouth 2 (two) times a day, Disp: 180 tablet, Rfl: 0    triamcinolone (KENALOG) 0.1 % cream, Apply topically to both hands twice daily for up to 14 days at a time as needed for eczema flare ups on hands. Do not put on face, groin or body folds. (Patient not taking: Reported on 2/20/2024), Disp: 80 g, Rfl: 2  No current facility-administered medications for this visit.    Current Allergies     Allergies as of 02/20/2024 - Reviewed 02/20/2024   Allergen Reaction Noted    Cefuroxime  02/21/2013    Cetirizine  04/27/2018    Fexofenadine  04/27/2018    Penicillins  04/27/2018     Sulfa antibiotics  02/21/2013    Sulfanilamide  04/27/2018            The following portions of the patient's history were reviewed and updated as appropriate: allergies, current medications, past family history, past medical history, past social history, past surgical history and problem list.     Past Medical History:   Diagnosis Date    Fibroids 1994    UTERUS       Past Surgical History:   Procedure Laterality Date    ESOPHAGOGASTRODUODENOSCOPY      MYOMECTOMY      OTHER SURGICAL HISTORY      fatty tissue removal    WISDOM TOOTH EXTRACTION         Family History   Problem Relation Age of Onset    Hypertension Mother     Lung cancer Father 65    No Known Problems Sister     Lymphoma Maternal Grandmother         age unknown    No Known Problems Maternal Grandfather     No Known Problems Paternal Grandmother     No Known Problems Paternal Grandfather     No Known Problems Maternal Aunt     No Known Problems Maternal Aunt     No Known Problems Maternal Aunt     No Known Problems Maternal Aunt     Kidney failure Maternal Uncle     No Known Problems Paternal Aunt     No Known Problems Paternal Aunt     No Known Problems Paternal Aunt     Heart attack Family     Stroke Family          Medications have been verified.        Objective   /82 (BP Location: Left arm, Patient Position: Sitting)   Pulse 99   Temp (!) 96.7 °F (35.9 °C)   Resp 18   LMP 07/03/2017   SpO2 100%   Patient's last menstrual period was 07/03/2017.       Physical Exam     Physical Exam  Eyes:      Extraocular Movements: Extraocular movements intact.      Pupils: Pupils are equal, round, and reactive to light.      Comments: I applied 2 drops of tetracaine into right eye followed by fluorescein dye.  I was able to observe a corneal abrasion located at 4:00.  Sclera is injected in the medial aspect.  No foreign body observed in the upper or lower eyelid.

## 2024-02-20 NOTE — PATIENT INSTRUCTIONS
After applying 2 drops of tetracaine to right eye followed by fluorescein.  I was able to observe corneal abrasion located at 4:00.  No foreign body observed.  I prescribed tobramycin eyedrop 1 drop into right eye every 4 hours while awake for 5 days.  Advised to apply cool compress as needed.  May continue with moisturizing drops.  If pain redness (or photophobia develops into 3 days, patient was given outpatient ophthalmology referral.    Corneal Abrasion   WHAT YOU NEED TO KNOW:   A corneal abrasion is a scratch on the cornea of your eye. The cornea is the clear layer that covers the front of your eye. A small scratch may heal in 1 to 2 days. Deeper or larger scratches may take longer to heal.        DISCHARGE INSTRUCTIONS:   Call your doctor or ophthalmologist if:   Your eye pain or vision gets worse.    You have yellow or green drainage from your eye.    You have questions or concerns about your condition or care.    Medicines:   Medicines  may be given in the form of eyedrops or ointment to help prevent an eye infection. You may also be given eyedrops to decrease pain.    Take your medicine as directed.  Contact your healthcare provider if you think your medicine is not helping or if you have side effects. Tell your provider if you are allergic to any medicine. Keep a list of the medicines, vitamins, and herbs you take. Include the amounts, and when and why you take them. Bring the list or the pill bottles to follow-up visits. Carry your medicine list with you in case of an emergency.    Care for your eyes:   Get regular eye exams.  Get your eyes checked at least every year.    Eat healthy foods.  Fresh fruits and vegetables that are rich in vitamins A and C may help with your vision. Foods such as sweet potatoes, apricots, and carrots are rich in nutrients for the eyes.            Take care of your contacts or glasses.  Store, clean, and use your contacts or glasses as directed. Replace your glasses or  contact lenses as often as your healthcare provider suggests.    Decrease eye strain.  Rest your eyes, especially after you read or use a computer for long periods of time. Get plenty of sleep at night. Use lights that reduce glare in your home, school, or workplace.    Wear dark sunglasses.  This will help prevent pain and light sensitivity. Make sure the sunglasses have UVA and UVB protection. This will protect your eyes when you go outside.    Use eyedrops safely.  If your treatment plan includes eyedrops, it is important to use them as directed. Your provider may give you detailed instructions to follow. The eyedrops may also come with safety instructions. Follow all instructions to help prevent an infection. Do not touch the tip of the bottle to your eye. Germs from your eye can spread to the medicine bottle.       Help prevent corneal abrasions:   Remove your contact lenses if your eyes feel dry or irritated.    Wash your hands if you need to touch your eyes or your face.         Trim your fingernails so you cannot scratch your eye.    Wear protective eyewear when you work with chemicals, wood, dust, or metal.    Wear protective eyewear when you play sports.    Do not wear your contacts for longer than you should.    Do not sleep with your contacts in.    Do not wear glitter makeup. Glitter can easily get into your eyes and under contact lenses.    Follow up with your doctor or ophthalmologist as directed:  Write down your questions so you remember to ask them during your visits.  © Copyright [employer] 2023 Information is for End User's use only and may not be sold, redistributed or otherwise used for commercial purposes.  The above information is an  only. It is not intended as medical advice for individual conditions or treatments. Talk to your doctor, nurse or pharmacist before following any medical regimen to see if it is safe and effective for you.

## 2024-03-02 ENCOUNTER — APPOINTMENT (OUTPATIENT)
Dept: LAB | Facility: MEDICAL CENTER | Age: 59
End: 2024-03-02
Payer: COMMERCIAL

## 2024-03-02 DIAGNOSIS — Z79.899 HIGH RISK MEDICATION USE: ICD-10-CM

## 2024-03-02 LAB
ALBUMIN SERPL BCP-MCNC: 4.2 G/DL (ref 3.5–5)
ALP SERPL-CCNC: 56 U/L (ref 34–104)
ALT SERPL W P-5'-P-CCNC: 14 U/L (ref 7–52)
AST SERPL W P-5'-P-CCNC: 22 U/L (ref 13–39)
BILIRUB DIRECT SERPL-MCNC: 0.18 MG/DL (ref 0–0.2)
BILIRUB SERPL-MCNC: 0.6 MG/DL (ref 0.2–1)
PROT SERPL-MCNC: 7.3 G/DL (ref 6.4–8.4)

## 2024-03-02 PROCEDURE — 80076 HEPATIC FUNCTION PANEL: CPT

## 2024-03-02 PROCEDURE — 36415 COLL VENOUS BLD VENIPUNCTURE: CPT

## 2024-03-14 ENCOUNTER — OFFICE VISIT (OUTPATIENT)
Dept: DERMATOLOGY | Facility: CLINIC | Age: 59
End: 2024-03-14
Payer: COMMERCIAL

## 2024-03-14 VITALS — WEIGHT: 138 LBS | TEMPERATURE: 98.9 F | BODY MASS INDEX: 26.95 KG/M2

## 2024-03-14 DIAGNOSIS — L82.1 SEBORRHEIC KERATOSIS: ICD-10-CM

## 2024-03-14 DIAGNOSIS — D22.61 MULTIPLE BENIGN MELANOCYTIC NEVI OF BOTH UPPER EXTREMITIES, BOTH LOWER EXTREMITIES, AND TRUNK: ICD-10-CM

## 2024-03-14 DIAGNOSIS — D23.9 DERMATOFIBROMA: ICD-10-CM

## 2024-03-14 DIAGNOSIS — D22.71 MULTIPLE BENIGN MELANOCYTIC NEVI OF BOTH UPPER EXTREMITIES, BOTH LOWER EXTREMITIES, AND TRUNK: ICD-10-CM

## 2024-03-14 DIAGNOSIS — D22.72 MULTIPLE BENIGN MELANOCYTIC NEVI OF BOTH UPPER EXTREMITIES, BOTH LOWER EXTREMITIES, AND TRUNK: ICD-10-CM

## 2024-03-14 DIAGNOSIS — D18.01 CHERRY ANGIOMA: ICD-10-CM

## 2024-03-14 DIAGNOSIS — K13.0 PERLECHE: ICD-10-CM

## 2024-03-14 DIAGNOSIS — D22.62 MULTIPLE BENIGN MELANOCYTIC NEVI OF BOTH UPPER EXTREMITIES, BOTH LOWER EXTREMITIES, AND TRUNK: ICD-10-CM

## 2024-03-14 DIAGNOSIS — L30.9 HAND DERMATITIS: ICD-10-CM

## 2024-03-14 DIAGNOSIS — D22.5 MULTIPLE BENIGN MELANOCYTIC NEVI OF BOTH UPPER EXTREMITIES, BOTH LOWER EXTREMITIES, AND TRUNK: ICD-10-CM

## 2024-03-14 DIAGNOSIS — L60.8 MELANONYCHIA STRIATA: Primary | ICD-10-CM

## 2024-03-14 PROCEDURE — 99214 OFFICE O/P EST MOD 30 MIN: CPT

## 2024-03-14 RX ORDER — TRIAMCINOLONE ACETONIDE 1 MG/G
OINTMENT TOPICAL 2 TIMES DAILY
Qty: 80 G | Refills: 2 | Status: SHIPPED | OUTPATIENT
Start: 2024-03-14

## 2024-03-14 NOTE — PATIENT INSTRUCTIONS
Start Hydrocortisone 2.5 cream twice daily, PRN    Clotrimazole (Lotrimin cream)     MELANONYCHIA STRIATA  Assessment and Plan:  Based on a thorough discussion of this condition and the management approach to it (including a comprehensive discussion of the known risks, side effects and potential benefits of treatment), the patient (family) agrees to implement the following specific plan:    Measurements and photos compared to prior and stable   She is to monitor for change, widening, Bond's sign (described to patient). No asymmetry or Bond's sign on any nails today  Follow up for changes    What is melanonychia?  Melanonychia is characterised by brown to black discolouration of a finger or toe nail. Longitudinal melanonychia describes a pigmented band and is due to melanin within the nail plate.   Longitudinal melanonychia is most often benign and arises from a  pigmented melanocytic naevus (a mole) or a lentigo (a freckle). However, a band of brown pigment in a single nail must be examined and investigated with caution, as melanonychia may be the presenting sign of melanoma of the nail unit.    Who is at risk of melanonychia?  Melanonychia occurs more commonly in dark-skinned individuals (Gaming skin type V and VI). Studies suggest that nearly all Afro-Caribbeans will develop black-brown pigmentation of the nails by the age of 50. It may also be present in up to 20% of [place] patients. White-skinned people are less commonly affected.  Melanonychia can present in individuals of all ages, including children, and affects both sexes equally.    What causes melanonychia?  The nail (nail plate) is a hard and translucent structure that is not normally pigmented and is made of the skin protein, keratin.    Pigmentation results from the deposition of melanin by the pigment cells, or melanocytes. These typically lie dormant in the nail matrix where the nail originates. As the melanin is continuously  deposited in the keratinocytic cells of the growing nail, a longitudinal streak arises. This is termed longitudinal melanonychia.    This deposition of melanin can result from 2 broad processes; melanocytic activation or melanocytic hyperplasia.    Melanocytic activation  Melanocytic activation is an increase in the production and deposition of melanin into the nail cells (onychocytes), without an increase in the number of melanocytes. There are a number of causes.    Physiological (functional)  Racial variation  Pregnancy  Trauma  Nail biting  Friction: foot deformity, unsuitable footwear  Carpal tunnel syndrome  Inflammatory skin disease  Psoriasis  Lichen planus  Amyloidosis  Onychomycosis (nail infection)  Nonmelanocytic lesions  Intraepidermal carcinoma  Basal cell carcinoma  Viral warts  Endocrine disorders  [place] disease  Cushing syndrome  Hyperthyroidism  Acromegaly  Other systemic disease  Haemosiderosis (iron overload)  Porphyria cutanea tarda  Human immunodeficiency virus infection (HIV)  Systemic lupus erythematosus  Systemic sclerosis  Syndrome-associations  Laugier-Hunziker syndrome  Peutz-Jeghers syndrome  Touraine syndrome  Iatrogenic (caused by medical treatment)  Phototherapy  X-ray exposure; electron beam therapy  Medication-related  Chemotherapy agents (especially hydroxyurea, busulfan, bleomycin, adriamycin, doxorubicin, cyclophosphamide, 5-fluorouracil)  Anti-malarial therapy    Melanocytic hyperplasia  Melanocytic hyperplasia refers to an increased number of the pigment cells (melanocytes) within the nail matrix. This can represent either a benign or a malignant process.  Benign  Lentigines are seen more commonly in adults  Melanocytic naevi are seen more commonly in children. Histologically these are differentiated by the absence or presence of melanocyte nests  Malignant  Melanoma of the nail unit most commonly affects the thumbs, index fingers and big toes    Pigmentation due to external  sources  Pathogens can also cause melanonychia.  Some gram-negative bacteria (Proteus mirabilis, Klebsiella, Pseudomonas) and dermatophytes (Trichophyton rubrum) can produce melanin that stains the nail.  Other pathogens involved in infections of the nail (onychomycosis, paronychia) can stimulate inflammation that activates the melanocytes.  The pigment may be exogenous and deposited on the top of the nail plate. Possible sources include:  Tobacco  Potassium permanganate  Silver nitrate  Dirt  [place]  Hair dye    What are the clinical features of melanonychia?  Melanonychia typically manifests as a single brown-black pigmented streak. It may affect a single nail, or be observed in multiple nails.  Longitudinal melanonychia extends from the nail fold (cuticle) to the free edge of the nail.  Complete melanonychia involves the entire nail plate and is due to pigment in the proximal nail matrix. Partial longitudinal melanonychia can be due to distal tumour under the nail plate.  Transverse melanonychia runs across the nail. Transverse melanonychia is rare, and is often associated with iatrogenic causes such as irradiation and medications.  Grey colour often indicates melanocytic activation, whereas a brown band is due to melanocytic hyperplasia.  Evaluate whether the pigment band has a regular parallel pattern (benign) or an irregular pattern with loss of parallelism and irregular dots (potential melanoma).   Nail matrix melanoma affects both finger and toenails and most frequent in index, thumb, and the large toe.    How is the diagnosis made?  Physical examination must involve inspection of all 20 nails, nail folds and mucosal membranes to detect associated signs that may suggest the underlying cause. In particular, pigmentation of mucosal membranes is associated with Laugier-Hunziker syndrome and Joe disease.  Pathogen-induced pigmentation should be suspected when pigmentation appears at the nail edge or can be  removed by scraping. Exogenous pigmentation grows-out with the nail plate, or may similarly be scraped off. Ascorbic acid at 10% concentration may be used to remove potassium permanganate staining.    Dermoscopy: examination of benign longitudinal melanonychia should reveal light to dark brown lines or bands that are parallel, regular in colour, thickness and spacing as the band extends from the nail fold to the free edge. The borders should be clearly defined and usually of a width of less than 3 mm.  Dermatoscopic examination from the free edge of the nail may help reveal the origin of the implicated melanocytes. The proximal nail matrix produces the superficial (dorsal) nail plate, whereas the distal nail matrix and nail bed produces the deep (ventral) nail plate.  An International Derm[employer] Society study concluded that acquired longitudinal melanonychias in adults should give rise to suspicion of  nail unit melanoma if any of the following are present.  Involvement of more than two-thirds of the nail plate   Grey or black in addition to brown colour  Irregular brown pigmentation  Granular pigmentation  And/or nail dystrophy.  Additional factors of concern include:  New pigment, especially if there is no preceding trauma or other explanation  Recurrent spontaneous haemorrhage in the same site  Variation in colour and thickness of the pigment band.   Nail biopsy: definitive exclusion of melanoma of the nail unit is obtained with a nail matrix biopsy. There should be a low threshold for biopsy especially in elderly patients where melanonychia has appeared in a single digit.    What are the differential diagnoses of melanonychia?  The most important differential to exclude is melanoma of the nail unit. Features that should raise suspicion are given by the ABCDEF mnemonic:  A: Age >50 years old  B: Brown to black, blurred borders, breadth >3mm   C: Changes of melanonychia or nail plate   D: Digit: single digit,  especially thumb, big toe and index finger   E: Extension of pigment into nail fold (Bond sign)   F: Family or personal history of melanoma    Another common differential is subungual haematoma (bleeding under the nail). This is typically preceded by trauma to the affected digit, although this may be unnoticed trauma. It can often be due to tight shoes. On dermatoscopy, the pigment is red to purple in colour and presents as dots or globules with a well-circumscribed edge closest to the cuticle. Within a few weeks, the purple patch may begin moving outwards towards the free edge of the nail.    What are the potential complications of melanonychia?  Biopsy can result in nail plate deformity.  Melanoma can cause subungual haemorrhage, unsightly nail dystrophy with fissuring and splitting of the nail plate.     What is the management of melanonychia?  Where melanonychia is attributed to a benign cause, no further treatment is necessary.  The management of melanoma of the nail unit requires complete excision of the tumour, which may require amputation of part of the digit.    What is the outlook for patients with melanonychia?  Melanonychia tends to persist, except when it is related to medication - in which case it fades following withdrawal of the medication.  Nail matrix melanoma tends to have a poor prognosis.      MICHAELECHE  Assessment and Plan:  Based on a thorough discussion of this condition and the management approach to it (including a comprehensive discussion of the known risks, side effects and potential benefits of treatment), the patient (family) agrees to implement the following specific plan:  Start Hydrocortisone 2.5 cream twice daily for two weeks, PRN thereafter if darkening recurs  Start otc clotrimazole to corners of mouth for two weeks    HAND DERMATITIS  Assessment and Plan:  Based on a thorough discussion of this condition and the management approach to it (including a comprehensive  discussion of the known risks, side effects and potential benefits of treatment), the patient (family) agrees to implement the following specific plan:  Start TMC ointment apply twice daily for two weeks at a time for flares  Discontinue triamcinolone cream  Keep moisturizer by sink and apply after washing hands  Discussed this could be caused by irritation to the hands from gloves, hand washing, soap

## 2024-03-14 NOTE — PROGRESS NOTES
"[facility]atology Clinic Note     Patient Name: Trinity Parra  Encounter Date: 03/14/24     Have you been cared for by a [facility] Dermatologist in the last 3 years and, if so, which description applies to you?    Yes.  I have been here within the last 3 years, and my medical history has NOT changed since that time.  I am FEMALE/of child-bearing potential.    REVIEW OF SYSTEMS:  Have you recently had or currently have any of the following? No changes in my recent health.   PAST MEDICAL HISTORY:  Have you personally ever had or currently have any of the following?  If \"YES,\" then please provide more detail. No changes in my medical history.   HISTORY OF IMMUNOSUPPRESSION: Do you have a history of any of the following:  Systemic Immunosuppression such as Diabetes, Biologic or Immunotherapy, Chemotherapy, Organ Transplantation, Bone Marrow Transplantation?  No     Answering \"YES\" requires the addition of the dotphrase \"IMMUNOSUPPRESSED\" as the first diagnosis of the patient's visit.   FAMILY HISTORY:  Any \"first degree relatives\" (parent, brother, sister, or child) with the following?    No changes in my family's known health.   PATIENT EXPERIENCE:    Do you want the Dermatologist to perform a COMPLETE skin exam today including a clinical examination under the \"bra and underwear\" areas?  NO  If necessary, do we have your permission to call and leave a detailed message on your Preferred Phone number that includes your specific medical information?  NO      Allergies   Allergen Reactions    Cefuroxime      Other reaction(s): Hives    Cetirizine      Other reaction(s): sleepy  Other reaction(s): sleepy    Fexofenadine      Other reaction(s): palpitations  Other reaction(s): palpitations    Penicillins     Sulfa Antibiotics     Sulfanilamide      Other reaction(s): Hives      Current Outpatient Medications:     Ascorbic Acid (RALPH-C PO), Take by mouth  , Disp: , Rfl:     cholecalciferol (VITAMIN D3) 1,000 units " tablet, Take 1,000 Units by mouth daily , Disp: , Rfl:     estradiol-norethindrone (COMBIPATCH) 0.05-0.14 MG/DAY, Place 1 patch on the skin 2 (two) times a week, Disp: 24 patch, Rfl: 3    fezolinetant (Veozah) tablet, Take 1 tablet (45 mg total) by mouth daily (Patient not taking: Reported on 2/20/2024), Disp: 90 tablet, Rfl: 3    Flowflex COVID-19 Ag Home Test KIT, , Disp: , Rfl:     hydrocortisone 2.5 % cream, Apply topically 3 (three) times a day (Patient not taking: Reported on 2/20/2024), Disp: 15 g, Rfl: 2    ibuprofen (MOTRIN) 200 mg tablet, Take 4 tablets (800 mg total) by mouth every 8 (eight) hours as needed for mild pain With food, Disp: 30 tablet, Rfl: 0    loratadine-pseudoephedrine (CLARITIN-D 24-HOUR)  mg per 24 hr tablet, Take 1 tablet by mouth daily, Disp: 90 tablet, Rfl: 3    loratadine-pseudoephedrine (KLS AllerClear D-12HR) 5-120 mg per tablet, Take 1 tablet by mouth 2 (two) times a day, Disp: 180 tablet, Rfl: 0    Multiple Vitamins-Minerals (MULTIVITAMIN ADULT PO), every 24 hours, Disp: , Rfl:     Omega-3 Fatty Acids (fish oil) 1,000 mg, Take by mouth, Disp: , Rfl:     omeprazole (PriLOSEC) 40 MG capsule, TAKE 1 CAPSULE BY MOUTH EVERY DAY BEFORE BREAKFAST, Disp: 90 capsule, Rfl: 5    triamcinolone (KENALOG) 0.1 % cream, Apply topically to both hands twice daily for up to 14 days at a time as needed for eczema flare ups on hands. Do not put on face, groin or body folds. (Patient not taking: Reported on 2/20/2024), Disp: 80 g, Rfl: 2    valACYclovir (VALTREX) 1,000 mg tablet, One bid prn herpes outbreak, Disp: 30 tablet, Rfl: 6          Whom besides the patient is providing clinical information about today's encounter?   NO ADDITIONAL HISTORIAN (patient alone provided history)    Physical Exam and Assessment/Plan by Diagnosis:      MELANONYCHIA STRIATA    Physical Exam:  Anatomic Location Affected:  Bilateral great toenails and all fingernails   Morphological Description:  Several uniformly  colored brown streaks   Negative Bond's sign on all nails. Prior photos reviewed in her chart- no changes from prior appearance in photos  Pertinent Positives:  Pertinent Negatives:    Additional History of Present Condition:  Patient present for recheck, states no changes since last visit     Assessment and Plan:  Based on a thorough discussion of this condition and the management approach to it (including a comprehensive discussion of the known risks, side effects and potential benefits of treatment), the patient (family) agrees to implement the following specific plan:    Measurements and photos compared to prior and appear stable   She is to monitor for change, widening, Bond's sign (described to patient). No asymmetry or Bond's sign on any nails today  Follow up for changes      PERLECHE  Physical Exam:  Anatomic Location Affected:  Outer corners of mouth  Morphological Description:  hyperpigmentation  Pertinent Positives:  Pertinent Negatives:    Additional History of Present Condition:  C/O darkness corner of mouth. Notice for 3 weeks. Happened once over 20 years ago    Assessment and Plan:  Based on a thorough discussion of this condition and the management approach to it (including a comprehensive discussion of the known risks, side effects and potential benefits of treatment), the patient (family) agrees to implement the following specific plan:  Start Hydrocortisone 2.5 cream twice daily for two weeks, PRN thereafter if darkening recurs  Start otc clotrimazole to corners of mouth for two weeks    DERMATOFIBROMA    Physical Exam:  Anatomic Location Affected:  Left arm, left lower leg, right lower leg.   Morphological Description:  Brown papules with dimple sign  Pertinent Positives:  Pertinent Negatives:    Additional History of Present Condition:  Present for years. No changes     Assessment and Plan:  Based on a thorough discussion of this condition and the management approach to it  "(including a comprehensive discussion of the known risks, side effects and potential benefits of treatment), the patient (family) agrees to implement the following specific plan:  Benign, assurance provided.   Discussed that these are normal spots that are a result of some sort of trauma to the area previously.   Monitor for changes    Assessment and Plan:  A dermatofibroma is a common benign fibrous nodule that most often arises on the skin of the lower legs.  A dermatofibroma is also called a \"cutaneous fibrous histiocytoma.\"  Dermatofibromas occur at all ages and in people of every ethnicity. They are more common in women than in men.    It is not clear if dermatofibroma is a reactive process or if it is a neoplasm. The lesions are made up of proliferating fibroblasts. Histiocytes may also be involved.  They are sometimes attributed to an insect bite or ingrownhair or local trauma, but not consistently. They may be more numerous in patients with altered immunity.    Dermatofibromas most often occur on the legs and arms, but may also arise on the trunk or any site of the body.  Typical clinical features include the following:  People may have 1 or up to 15 lesions.  Size varies from 0.5-1.5 cm diameter; most lesions are 7-10 mm diameter.  They are firm nodules tethered to the skin surface and mobile over subcutaneous tissue.  The skin \"dimples\" on pinching the lesion.  Color may be pink to light brown in white skin, and dark brown to black in dark skin; some appear paler in the center.  They do not usually cause symptoms, but they are sometimes painful or itchy.  Because they are often raised lesions, they may be traumatized, for example by a razor.  Occasionally dozens may erupt within a few months, usually in the setting of immunosuppression (for example autoimmune disease, cancer or certain medications).  Dermatofibroma does not give rise to cancer. However, occasionally, it may be mistaken for " dermatofibrosarcoma or desmoplastic melanoma.    A dermatofibroma is harmless and seldom causes any symptoms. Usually, only reassurance is needed. If it is nuisance or causing concern, the lesion can be removed surgically, resulting in a scar that is, by definition, usually longer in diameter than the widest portion of the dermatofibroma.  Cryotherapy, shave biopsy and laser surgery are rarely completely successful.  Skin punch biopsy or incisional biopsy may be undertaken if there is an atypical feature such as recent enlargement, ulceration, or asymmetrical structures and colours on dermatoscopy.     SEBORRHEIC KERATOSES  - Relevant exam: Scattered over the trunk/extremities are waxy brown to black plaques and papules with stuck on appearance and consistent dermoscopy  - Exam and clinical history consistent with seborrheic keratoses  - Counseled that these are benign growths that do not require treatment  - Counseled that removal of lesions is considered cosmetic and so would incur a fee should patient elect to move forward.       MELANOCYTIC NEVI  -Relevant exam: Scattered over the trunk/extremities are homogenously pigmented brown macules and papules. ELM performed and without concerning findings. No outliers unless otherwise noted in today's note  - Exam and clinical history consistent with melanocytic nevi  - Educated on the ABCDE's of melanoma; handout provided  - Counseled to return to clinic prior to scheduled appointment should any of these lesions change or should any new lesions of concern arise  - Counseled on use of sun protection daily. Reviewed latest FDA sunscreen guidelines, including use of broad spectrum (UVA and UVB blocking) sunscreen or sun protective clothing with SPF 30-50 every 2-3 hours and reapplied after exposure to water; use of photoprotective clothing, including a broad brim hat and UPF rated clothing if outdoors for several hours; avoid use of tanning beds as these pose significant  risk for melanoma and skin cancer.      CHERRY ANGIOMAS  - Relevant exam: Scattered over the trunk/extremities are red papules  - Exam and clinical history consistent with cherry angiomas  - Educated that these are benign  - Educated that removal is considered aesthetic and would incur a fee.    HAND DERMATITIS  Physical Exam:  Anatomic Location Affected:  Bilateral hands  Morphological Description:  no notable changes; mild thickened, rough skin on fingers  Pertinent Positives:  Pertinent Negatives:    Additional History of Present Condition:  Patient treated with TMC cream and mixing with vaseline. Wears gloves for work and washes hands often    Assessment and Plan:  Based on a thorough discussion of this condition and the management approach to it (including a comprehensive discussion of the known risks, side effects and potential benefits of treatment), the patient (family) agrees to implement the following specific plan:  Start TMC ointment apply twice daily for two weeks at a time for flares  Discontinue triamcinolone cream  Keep moisturizer by sink and apply after washing hands  Discussed this could be caused by irritation to the hands from gloves, hand washing, soap    Scribe Attestation      I,:  Elio Morris am acting as a scribe while in the presence of the attending physician.:       I,:  Elisa Hein PA-C personally performed the services described in this documentation    as scribed in my presence.:

## 2024-06-06 ENCOUNTER — ANNUAL EXAM (OUTPATIENT)
Dept: GYNECOLOGY | Facility: CLINIC | Age: 59
End: 2024-06-06
Payer: COMMERCIAL

## 2024-06-06 ENCOUNTER — TELEPHONE (OUTPATIENT)
Age: 59
End: 2024-06-06

## 2024-06-06 VITALS
HEIGHT: 60 IN | SYSTOLIC BLOOD PRESSURE: 134 MMHG | WEIGHT: 136.6 LBS | HEART RATE: 87 BPM | BODY MASS INDEX: 26.82 KG/M2 | DIASTOLIC BLOOD PRESSURE: 60 MMHG

## 2024-06-06 DIAGNOSIS — Z12.11 SCREENING FOR COLON CANCER: ICD-10-CM

## 2024-06-06 DIAGNOSIS — N95.2 ATROPHIC VAGINITIS: ICD-10-CM

## 2024-06-06 DIAGNOSIS — Z12.31 ENCOUNTER FOR SCREENING MAMMOGRAM FOR MALIGNANT NEOPLASM OF BREAST: ICD-10-CM

## 2024-06-06 DIAGNOSIS — D21.9 FIBROIDS: ICD-10-CM

## 2024-06-06 DIAGNOSIS — Z01.419 ENCOUNTER FOR GYNECOLOGICAL EXAMINATION WITHOUT ABNORMAL FINDING: Primary | ICD-10-CM

## 2024-06-06 DIAGNOSIS — N95.1 VASOMOTOR SYMPTOMS DUE TO MENOPAUSE: ICD-10-CM

## 2024-06-06 PROCEDURE — S0612 ANNUAL GYNECOLOGICAL EXAMINA: HCPCS | Performed by: OBSTETRICS & GYNECOLOGY

## 2024-06-06 PROCEDURE — G0145 SCR C/V CYTO,THINLAYER,RESCR: HCPCS | Performed by: OBSTETRICS & GYNECOLOGY

## 2024-06-06 RX ORDER — ESTRADIOL 0.1 MG/G
1 CREAM VAGINAL WEEKLY
Qty: 42.5 G | Refills: 3 | Status: SHIPPED | OUTPATIENT
Start: 2024-06-06

## 2024-06-06 NOTE — TELEPHONE ENCOUNTER
Pharmacist calling to see if able to reduce Estrace cream refills from 3 to 1, as 3 will take patient over the one year lefty. RN confirmed ok so long as pt has 1 refill left. No further questions.

## 2024-06-06 NOTE — PROGRESS NOTES
Ambulatory Visit  Name: Trinity Parra      : 1965      MRN: [id number]  Encounter Provider: Everton Daniel DO  Encounter Date: 2024   Encounter department: [facility]    Assessment & Plan   1. Encounter for gynecological examination without abnormal finding  -     Liquid-based pap, screening  2. Fibroids  3. Encounter for screening mammogram for malignant neoplasm of breast  -     Mammo screening bilateral w 3d & cad; Future  4. Atrophic vaginitis  -     estradiol (ESTRACE) 0.1 mg/g vaginal cream; Insert 1 g into the vagina once a week  5. Vasomotor symptoms due to menopause  -     fezolinetant (Veozah) tablet; Take 1 tablet (45 mg total) by mouth daily  6. Screening for colon cancer        Recheck liver enzymes and repeat in 3 and 6 months and 9 months secondary to Veozah  Start Estrace cream for vaginal atrophy    Referred back to PCP secondary to cardiac murmur    History of Present Illness     Trinity Parra is a 59 y.o. female who presents for annual examination.  She has been on the [facility] for vasomotor symptoms.  This is working fairly well.  Due to issue with supplies she has only been back on this for approximately a month or 2.  She had previously been on CombiPatch.  She presents complaining of occasional vaginal itching.  She denies any vaginal discharge or bleeding.  Denies any dysuria, hematuria urgency or urinary incontinence.  No GI complaints.    Colonoscopy age 59.  Repeat in 10 years.    Osteoporosis screening via peripheral scan May 2022.  0.9    Review of Systems   Constitutional: Negative.    HENT:  Negative for sore throat and trouble swallowing.    Gastrointestinal: Negative.    Genitourinary: Negative.        Objective     /60   Pulse 87   Ht 5' (1.524 m)   Wt 62 kg (136 lb 9.6 oz)   LMP 2017   BMI 26.68 kg/m²     Physical Exam  Vitals reviewed.   Cardiovascular:      Rate and Rhythm: Normal rate and regular rhythm.       Pulses: Normal pulses.      Heart sounds: Murmur heard.      Systolic murmur is present with a grade of 2/6.   Pulmonary:      Effort: Pulmonary effort is normal. No respiratory distress.      Breath sounds: Normal breath sounds.   Chest:   Breasts:     Right: No swelling, bleeding, inverted nipple, mass, nipple discharge, skin change or tenderness.      Left: No swelling, bleeding, inverted nipple, mass, nipple discharge, skin change or tenderness.   Abdominal:      General: There is no distension.      Palpations: Abdomen is soft. There is no mass.      Tenderness: There is no abdominal tenderness. There is no guarding or rebound.      Hernia: No hernia is present. There is no hernia in the left inguinal area or right inguinal area.   Genitourinary:     General: Normal vulva.      Labia:         Right: No rash, tenderness or lesion.         Left: No rash, tenderness or lesion.       Vagina: Normal.      Cervix: Normal.      Uterus: Enlarged (stable 12 wk size fibroid uterus). Not deviated, not fixed, not tender and no uterine prolapse.       Adnexa:         Right: No mass, tenderness or fullness.          Left: No mass, tenderness or fullness.     Musculoskeletal:      Cervical back: Normal range of motion and neck supple. No tenderness.   Lymphadenopathy:      Cervical: No cervical adenopathy.      Upper Body:      Right upper body: No supraclavicular, axillary or pectoral adenopathy.      Left upper body: No supraclavicular, axillary or pectoral adenopathy.      Lower Body: No right inguinal adenopathy. No left inguinal adenopathy.   Neurological:      Mental Status: She is alert.       Administrative Statements

## 2024-06-14 LAB
LAB AP GYN PRIMARY INTERPRETATION: NORMAL
Lab: NORMAL

## 2024-06-18 ENCOUNTER — TELEPHONE (OUTPATIENT)
Age: 59
End: 2024-06-18

## 2024-06-18 ENCOUNTER — PREP FOR PROCEDURE (OUTPATIENT)
Age: 59
End: 2024-06-18

## 2024-06-18 DIAGNOSIS — Z12.11 SCREENING FOR COLON CANCER: Primary | ICD-10-CM

## 2024-06-18 NOTE — TELEPHONE ENCOUNTER
Scheduled date of colonoscopy (as of today):7/14/25    Physician performing colonoscopy:Dr Thakkar    Location of colonoscopy:[facility]    Bowel prep reviewed with patient:miralax/dulcolax    Instructions reviewed with patient by:prep instructions sent via [employer]    Clearances: N/A

## 2024-06-18 NOTE — TELEPHONE ENCOUNTER
24  Screened by: Agnieszka Coyle    Referring Provider Dr GALE     Pre- Screenin' 136 BMI 26.68    Has patient been referred for a routine screening Colonoscopy? yes  Is the patient between 45-75 years old? yes      Previous Colonoscopy yes   If yes:    Date:     Facility:     Reason:         Does the patient want to see a Gastroenterologist prior to their procedure OR are they having any GI symptoms? no    Has the patient been hospitalized or had abdominal surgery in the past 6 months? no    Does the patient use supplemental oxygen? no    Does the patient take Coumadin, Lovenox, Plavix, Elliquis, Xarelto, or other blood thinning medication? no    Has the patient had a stroke, cardiac event, or stent placed in the past year? no    If patient is between 45yrs - 49yrs, please advise patient that we will have to confirm benefits & coverage with their insurance company for a routine screening colonoscopy.

## 2024-06-24 ENCOUNTER — APPOINTMENT (OUTPATIENT)
Dept: LAB | Facility: MEDICAL CENTER | Age: 59
End: 2024-06-24
Payer: COMMERCIAL

## 2024-06-24 ENCOUNTER — TELEPHONE (OUTPATIENT)
Age: 59
End: 2024-06-24

## 2024-06-24 DIAGNOSIS — Z79.899 HIGH RISK MEDICATION USE: ICD-10-CM

## 2024-06-24 DIAGNOSIS — Z79.899 HIGH RISK MEDICATION USE: Primary | ICD-10-CM

## 2024-06-24 LAB
ALBUMIN SERPL BCG-MCNC: 4.1 G/DL (ref 3.5–5)
ALP SERPL-CCNC: 57 U/L (ref 34–104)
ALT SERPL W P-5'-P-CCNC: 16 U/L (ref 7–52)
AST SERPL W P-5'-P-CCNC: 19 U/L (ref 13–39)
BILIRUB DIRECT SERPL-MCNC: 0.06 MG/DL (ref 0–0.2)
BILIRUB SERPL-MCNC: 0.35 MG/DL (ref 0.2–1)
PROT SERPL-MCNC: 7.3 G/DL (ref 6.4–8.4)

## 2024-06-24 PROCEDURE — 36415 COLL VENOUS BLD VENIPUNCTURE: CPT

## 2024-06-24 PROCEDURE — 80076 HEPATIC FUNCTION PANEL: CPT

## 2024-06-24 NOTE — TELEPHONE ENCOUNTER
Patient had a question in regards to her labs, states that she has scripts for a Hepatic function panel for 6/8 and 9/24, patient wants to know why those are not in her MyChart ( I do not see them either under labs) and wants to confirm she is able to go to [facility] to have these done. Please advise.

## 2024-07-01 ENCOUNTER — OFFICE VISIT (OUTPATIENT)
Dept: FAMILY MEDICINE CLINIC | Facility: CLINIC | Age: 59
End: 2024-07-01

## 2024-07-01 VITALS
HEART RATE: 82 BPM | TEMPERATURE: 98.1 F | WEIGHT: 138.4 LBS | DIASTOLIC BLOOD PRESSURE: 72 MMHG | OXYGEN SATURATION: 100 % | BODY MASS INDEX: 27.17 KG/M2 | RESPIRATION RATE: 18 BRPM | HEIGHT: 60 IN | SYSTOLIC BLOOD PRESSURE: 140 MMHG

## 2024-07-01 DIAGNOSIS — R01.1 MURMUR: ICD-10-CM

## 2024-07-01 DIAGNOSIS — K21.9 GASTROESOPHAGEAL REFLUX DISEASE WITHOUT ESOPHAGITIS: ICD-10-CM

## 2024-07-01 DIAGNOSIS — Z13.6 SCREENING FOR CARDIOVASCULAR CONDITION: ICD-10-CM

## 2024-07-01 DIAGNOSIS — B00.9 RECURRENT HERPES SIMPLEX: Chronic | ICD-10-CM

## 2024-07-01 DIAGNOSIS — J30.9 ALLERGIC RHINITIS, UNSPECIFIED SEASONALITY, UNSPECIFIED TRIGGER: Primary | ICD-10-CM

## 2024-07-01 DIAGNOSIS — Z13.1 SCREENING FOR DIABETES MELLITUS: ICD-10-CM

## 2024-07-01 RX ORDER — OMEPRAZOLE 40 MG/1
40 CAPSULE, DELAYED RELEASE ORAL
Qty: 90 CAPSULE | Refills: 5 | Status: SHIPPED | OUTPATIENT
Start: 2024-07-01

## 2024-07-01 NOTE — ASSESSMENT & PLAN NOTE
Unchanged  Remains on claritin d daily  Discussed with patient that taking a daily pseudophedrine can increase blood pressuren  Will try to wean patient off of pseudofedrine  Start alternating claratin D and loratadine.

## 2024-07-01 NOTE — ASSESSMENT & PLAN NOTE
Worsening  Uses omeprazole as needed  Occurs more when she is stressed  May benefit from endoscopy when she is due for colonoscopy

## 2024-07-01 NOTE — PROGRESS NOTES
Adult Annual Physical  Name: Trinity Parra      : 1965      MRN: [id number]  Encounter Provider: Patrice Gross DO  Encounter Date: 2024   Encounter department: [facility]    Assessment & Plan   1. Allergic rhinitis, unspecified seasonality, unspecified trigger  Assessment & Plan:  Unchanged  Remains on claritin d daily  Discussed with patient that taking a daily pseudophedrine can increase blood pressuren  Will try to wean patient off of pseudofedrine  Start alternating claratin D and loratadine.  2. Gastroesophageal reflux disease without esophagitis  Assessment & Plan:  Worsening  Uses omeprazole as needed  Occurs more when she is stressed  May benefit from endoscopy when she is due for colonoscopy  Orders:  -     omeprazole (PriLOSEC) 40 MG capsule; Take 1 capsule (40 mg total) by mouth daily before breakfast  -     Ambulatory Referral to Gastroenterology; Future  3. Recurrent herpes simplex  4. Murmur  -     Echo complete w/ contrast if indicated; Future; Expected date: 2024  5. Screening for cardiovascular condition  -     Lipid panel; Future  6. Screening for diabetes mellitus  -     Comprehensive metabolic panel; Future    Immunizations and preventive care screenings were discussed with patient today. Appropriate education was printed on patient's after visit summary.    Counseling:  Alcohol/drug use: discussed moderation in alcohol intake, the recommendations for healthy alcohol use, and avoidance of illicit drug use.  Dental Health: discussed importance of regular tooth brushing, flossing, and dental visits.  Injury prevention: discussed safety/seat belts, safety helmets, smoke detectors, carbon dioxide detectors, and smoking near bedding or upholstery.  Sexual health: discussed sexually transmitted diseases, partner selection, use of condoms, avoidance of unintended pregnancy, and contraceptive alternatives.  Exercise: the importance of  regular exercise/physical activity was discussed. Recommend exercise 3-5 times per week for at least 30 minutes.          History of Present Illness     Adult Annual Physical:  Patient presents for annual physical.     Diet and Physical Activity:  - Diet/Nutrition: well balanced diet and consuming 3-5 servings of fruits/vegetables daily.  - Exercise: 3-4 times a week on average and 30-60 minutes on average.    Depression Screening:  - PHQ-2 Score: 0    General Health:  - Sleep: sleeps well and unrefreshing sleep.  - Hearing: normal hearing right ear and normal hearing left ear.  - Vision: goes for regular eye exams and wears glasses.  - Dental: regular dental visits and brushes teeth twice daily.    /GYN Health:  - Follows with GYN: yes.   - Menopause: postmenopausal.   - History of STDs: no  - Contraception: menopause.      Review of Systems   Constitutional:  Negative for activity change, chills, diaphoresis and fever.   HENT:  Negative for ear pain, hearing loss, postnasal drip, rhinorrhea, sinus pressure, sinus pain, sneezing and sore throat.    Respiratory:  Negative for cough, chest tightness, shortness of breath and wheezing.    Cardiovascular:  Negative for chest pain, palpitations and leg swelling.   Gastrointestinal:  Negative for abdominal pain, blood in stool, constipation, diarrhea, nausea and vomiting.   Genitourinary:  Negative for dysuria, frequency, hematuria and urgency.   Musculoskeletal:  Negative for arthralgias and myalgias.   Neurological:  Negative for dizziness, syncope, weakness, light-headedness, numbness and headaches.   Psychiatric/Behavioral:  Negative for decreased concentration. The patient is not nervous/anxious.      Medical History Reviewed by provider this encounter:       Current Outpatient Medications on File Prior to Visit   Medication Sig Dispense Refill   • cholecalciferol (VITAMIN D3) 1,000 units tablet Take 1,000 Units by mouth daily DAILY     • estradiol (ESTRACE) 0.1  "mg/g vaginal cream Insert 1 g into the vagina once a week 42.5 g 3   • fezolinetant (Veozah) tablet Take 1 tablet (45 mg total) by mouth daily 90 tablet 3   • hydrocortisone 2.5 % cream Apply topically 2 (two) times a day For 2-3 weeks 28 g 1   • loratadine-pseudoephedrine (CLARITIN-D 24-HOUR)  mg per 24 hr tablet Take 1 tablet by mouth daily 90 tablet 3   • Multiple Vitamins-Minerals (MULTIVITAMIN ADULT PO) every 24 hours     • Omega-3 Fatty Acids (fish oil) 1,000 mg Take by mouth     • triamcinolone (KENALOG) 0.1 % ointment Apply topically 2 (two) times a day To hands 80 g 2   • valACYclovir (VALTREX) 1,000 mg tablet One bid prn herpes outbreak 30 tablet 6   • Ascorbic Acid (RALPH-C PO) Take by mouth       • Flowflex COVID-19 Ag Home Test KIT  (Patient not taking: Reported on 6/1/2023)       No current facility-administered medications on file prior to visit.      Social History     Tobacco Use   • Smoking status: Never   • Smokeless tobacco: Never   Vaping Use   • Vaping status: Never Used   Substance and Sexual Activity   • Alcohol use: Yes   • Drug use: No   • Sexual activity: Yes     Birth control/protection: Post-menopausal       Objective     /72 (BP Location: Left arm, Patient Position: Sitting, Cuff Size: Standard)   Pulse 82   Temp 98.1 °F (36.7 °C) (Temporal)   Resp 18   Ht 4' 11.5\" (1.511 m)   Wt 62.8 kg (138 lb 6.4 oz)   LMP 07/03/2017   SpO2 100%   BMI 27.49 kg/m²     Physical Exam  Constitutional:       General: She is not in acute distress.     Appearance: Normal appearance. She is well-developed. She is not diaphoretic.   HENT:      Head: Normocephalic and atraumatic.      Right Ear: Tympanic membrane, ear canal and external ear normal. There is no impacted cerumen.      Left Ear: Tympanic membrane, ear canal and external ear normal. There is no impacted cerumen.      Nose: Nose normal. No congestion or rhinorrhea.      Mouth/Throat:      Mouth: Mucous membranes are moist.      " Pharynx: Oropharynx is clear. No oropharyngeal exudate.   Eyes:      Conjunctiva/sclera: Conjunctivae normal.      Pupils: Pupils are equal, round, and reactive to light.   Neck:      Vascular: No JVD.   Cardiovascular:      Rate and Rhythm: Normal rate and regular rhythm.      Heart sounds: Murmur heard.      Systolic murmur is present.      No friction rub. No gallop.   Pulmonary:      Effort: Pulmonary effort is normal. No respiratory distress.      Breath sounds: Normal breath sounds. No wheezing or rales.   Chest:      Chest wall: No tenderness.   Abdominal:      General: Bowel sounds are normal. There is no distension.      Palpations: Abdomen is soft.      Tenderness: There is no abdominal tenderness. There is no right CVA tenderness, left CVA tenderness, guarding or rebound.   Musculoskeletal:         General: No tenderness. Normal range of motion.      Cervical back: No tenderness.   Lymphadenopathy:      Cervical: No cervical adenopathy.   Skin:     General: Skin is warm and dry.   Neurological:      Mental Status: She is alert and oriented to person, place, and time.      Cranial Nerves: No cranial nerve deficit.   Psychiatric:         Mood and Affect: Mood and affect normal.         Behavior: Behavior normal.

## 2024-07-16 LAB
ALBUMIN SERPL-MCNC: 4.3 G/DL (ref 3.6–5.1)
ALBUMIN/GLOB SERPL: 1.3 (CALC) (ref 1–2.5)
ALP SERPL-CCNC: 60 U/L (ref 37–153)
ALT SERPL-CCNC: 17 U/L (ref 6–29)
AST SERPL-CCNC: 19 U/L (ref 10–35)
BILIRUB SERPL-MCNC: 0.7 MG/DL (ref 0.2–1.2)
BUN SERPL-MCNC: 15 MG/DL (ref 7–25)
BUN/CREAT SERPL: ABNORMAL (CALC) (ref 6–22)
CALCIUM SERPL-MCNC: 10.1 MG/DL (ref 8.6–10.4)
CHLORIDE SERPL-SCNC: 103 MMOL/L (ref 98–110)
CHOLEST SERPL-MCNC: 211 MG/DL
CHOLEST/HDLC SERPL: 2.7 (CALC)
CO2 SERPL-SCNC: 33 MMOL/L (ref 20–32)
CREAT SERPL-MCNC: 0.82 MG/DL (ref 0.5–1.03)
GFR/BSA.PRED SERPLBLD CYS-BASED-ARV: 82 ML/MIN/1.73M2
GLOBULIN SER CALC-MCNC: 3.4 G/DL (CALC) (ref 1.9–3.7)
GLUCOSE SERPL-MCNC: 76 MG/DL (ref 65–99)
HDLC SERPL-MCNC: 79 MG/DL
LDLC SERPL CALC-MCNC: 113 MG/DL (CALC)
NONHDLC SERPL-MCNC: 132 MG/DL (CALC)
POTASSIUM SERPL-SCNC: 3.8 MMOL/L (ref 3.5–5.3)
PROT SERPL-MCNC: 7.7 G/DL (ref 6.1–8.1)
SODIUM SERPL-SCNC: 143 MMOL/L (ref 135–146)
TRIGL SERPL-MCNC: 87 MG/DL

## 2024-07-22 ENCOUNTER — TELEPHONE (OUTPATIENT)
Age: 59
End: 2024-07-22

## 2024-07-22 NOTE — TELEPHONE ENCOUNTER
Pt eager to get 7/15 labs reviewed. Some numbers are higher and she wants to know what carbon dioxide range is. Please call patient when reviewed.

## 2024-07-30 ENCOUNTER — HOSPITAL ENCOUNTER (OUTPATIENT)
Dept: MAMMOGRAPHY | Facility: MEDICAL CENTER | Age: 59
Discharge: HOME | End: 2024-07-30
Payer: COMMERCIAL

## 2024-07-30 VITALS — HEIGHT: 60 IN | WEIGHT: 138.45 LBS | BODY MASS INDEX: 27.18 KG/M2

## 2024-07-30 DIAGNOSIS — Z12.31 ENCOUNTER FOR SCREENING MAMMOGRAM FOR MALIGNANT NEOPLASM OF BREAST: ICD-10-CM

## 2024-07-30 PROCEDURE — 77063 BREAST TOMOSYNTHESIS BI: CPT

## 2024-07-30 PROCEDURE — 77067 SCR MAMMO BI INCL CAD: CPT

## 2024-09-18 ENCOUNTER — HOSPITAL ENCOUNTER (OUTPATIENT)
Dept: NON INVASIVE DIAGNOSTICS | Facility: HOSPITAL | Age: 59
Discharge: HOME | End: 2024-09-18
Attending: FAMILY MEDICINE
Payer: COMMERCIAL

## 2024-09-18 VITALS
HEIGHT: 60 IN | SYSTOLIC BLOOD PRESSURE: 123 MMHG | HEART RATE: 82 BPM | BODY MASS INDEX: 27.09 KG/M2 | DIASTOLIC BLOOD PRESSURE: 58 MMHG | WEIGHT: 138 LBS

## 2024-09-18 DIAGNOSIS — R01.1 MURMUR: ICD-10-CM

## 2024-09-18 LAB
BSA FOR ECHO PROCEDURE: 1.58 M2
RA PRESSURE ESTIMATED: 3 MMHG
SL CV LV EF: 70

## 2024-09-18 PROCEDURE — 93306 TTE W/DOPPLER COMPLETE: CPT

## 2024-09-23 ENCOUNTER — TELEPHONE (OUTPATIENT)
Age: 59
End: 2024-09-23

## 2024-09-23 NOTE — TELEPHONE ENCOUNTER
A.  Relayed results to (patient/patient representative as listed on communication consent form) as per provider message. Patient/Patient Representative expressed understanding and had the following question(s): Pt just wants to confirm that there is nothing to be concerned about in regard to the murmur? Please advise.     B. Route to OFFICE CLINICAL POOL for follow-up with provider.

## 2024-09-24 LAB
ALBUMIN SERPL-MCNC: 4.3 G/DL (ref 3.6–5.1)
ALBUMIN/GLOB SERPL: 1.4 (CALC) (ref 1–2.5)
ALP SERPL-CCNC: 61 U/L (ref 37–153)
ALT SERPL-CCNC: 16 U/L (ref 6–29)
AST SERPL-CCNC: 21 U/L (ref 10–35)
BILIRUB DIRECT SERPL-MCNC: 0.1 MG/DL
BILIRUB INDIRECT SERPL-MCNC: 0.4 MG/DL (CALC) (ref 0.2–1.2)
BILIRUB SERPL-MCNC: 0.5 MG/DL (ref 0.2–1.2)
GLOBULIN SER CALC-MCNC: 3 G/DL (CALC) (ref 1.9–3.7)
PROT SERPL-MCNC: 7.3 G/DL (ref 6.1–8.1)

## 2024-10-03 ENCOUNTER — TELEPHONE (OUTPATIENT)
Age: 59
End: 2024-10-03

## 2024-10-03 NOTE — TELEPHONE ENCOUNTER
Incoming call from patient. Is on Veozah - states that she is still having hot flashes. Concerned that it is not working as well as before. Also, patient reports feeling like her face is going to break out with pimples when she is in the sun. Unsure if this could be related to medication.     Advised would sent to provider for review.

## 2024-10-22 NOTE — PROGRESS NOTES
Daily Note     Today's date: 2021  Patient name: Kenton Simmons  : 1965  MRN: [id number]  Referring provider: Soledad Trejo DO  Dx:   Encounter Diagnosis     ICD-10-CM    1  Pelvic floor dysfunction  M62 89    2  Vaginal pain  R10 2                   Subjective: Pt states that she had been feeling good and was able to perform lunges with contralateral toe touch  Pt states that pain went away with rest and heat after "over doing it " Pt feels that progressions have decreased abdominal pain  Pt has been able to resume workouts on her glider with only noticing pain a little bit  Pt denies pain at start of session  Discussed with pt possibility of sports hernia, meaning pain at adductor and abdominal attachment along the groin without disruption to abdominal wall  Objective: See treatment diary below    Assessment: Tolerated treatment well  Patient demonstrated fatigue post treatment, exhibited good technique with therapeutic exercises and would benefit from continued PT to decrease R groin pain  Pt has good tolerance to nearly all exercises today  Pt is able to reduce pain in R groin with decreasing ROM into abduction to avoid increased stretch on adductors  Pt was given updated HEP and verbalized understanding  Pt rates pain about 2/10 at end of session  Plan: Continue per plan of care  Progress treatment as tolerated         Precautions: hx fibroids      Manuals 5/17 6/3 6/9 6/16 6/30        Cupping to R low transverse scar ED nv nv nv np        Internal assess nv ED np np np        Gr 1 PA mobs to lumbar spine  ED np np np        TPR to R psoas, then MWM  ED np np np        Neuro Re-Ed 5/17 6/3 6/9 6/16 6/30        TA iso 3"x5 3"x5 nv nv np        TA + sit up series reviewed hold --- --- ---        TA + bridge + hip add 10x, 10x pulse np np np np        Diaphragmatic breath nv nv nv nv np        Reformer squats w/ TA   All, 3x10 All, 3x10 np        Bridges w/ circles   RY, 10x, 5x ea RY, Subjective:      Jemima Huber is being seen today for her first obstetrical visit.  She is at Unknown gestation.     Patient's last menstrual period was 09/01/2024 (exact date).   7.2 by her LMP.    Nausea and vomiting: no longer vomiting  Per partner: Difficulty sleeping and moodiness  DM2: Metformin 1000 mg BID. No longer on Monjuro and Jardiance. Dexcom: Fasting about 90s, 2hr postpartum 150-155. MFM referral  AMA: genetic testing recommended  Obesity class III: aspirin at 12 week  Pap: 11/2023     Menstrual History:  OB History    No obstetric history on file.          Risk factors: DM Type 1 or 2, *Nulliparity, *BMI >30, and *Age>35    The following portions of the patient's history were reviewed and updated as appropriate: allergies, current medications, past family history, past medical history, past social history, past surgical history, and problem list.     Review of Systems  Constitutional: negative for chills and fatigue  Gastrointestinal: negative for abdominal pain, constipation, diarrhea, nausea and vomiting  Genitourinary:negative for abnormal menstrual periods, genital lesions and vaginal discharge, dysuria, frequency and hematuria     Objective:      /79 (Patient Position: Sitting)   Wt 120.1 kg (264 lb 12.4 oz)   LMP 09/01/2024 (Exact Date)   BMI 37.99 kg/m²   General appearance: alert, appears stated age, and cooperative      ULTRASOUND:   Bedside Ultrasound Findings    EXAMINATION:  US PELVIS COMP WITH TRANSVAG OB    CLINICAL HISTORY:  UPT positive    TECHNIQUE:  Transvaginal sonography    COMPARISON:  None    FINDINGS:  1. Uterus:    Appearance: Viable frey intrauterine pregnancy was seen, yolk sac was seen. Crown-rump length = 13.1 mm with flicker, consistent with 7.4wga and EDC 6/6/2025.        Impression      1. Single viable intrauterine pregnancy   2. Crown rump length consistent with 7.4wga, and estimated due date 6/6/25    Final EDC: 6/8/2025 by last menstrual period  10x, 10x ea np        TA + TB push/pulls   Grn, 10x ea Grn, 15x ea Blue TB, 15x ea        Ther Ex 5/17 6/3 6/9 6/16 6/30        Seated Tspine rotation w/ bar 3#, 5x ea nv nv nv 5x ea w and w/o bar        Rotating windmill lunge w/ TA 5x ea nv nv 10x ea 5x ea, 5x ea 50% ROM        TA + heel drops nv nv nv 2x10 ea, alt 10x ea, alt        TA + frogs w/ pball nv 10x nv nv 15x        TA + LTR on pball nv 10x ea nv nv 15x ea        TA + sheet brace  3"x10 nv nv nv        TA + march  10x ea nv 2x10 ea alt nv        SL squat with hip abd on slider   10x ea 15x ea 5x ea, 5x ea 50% ROM        Steamboats, all dir   Grn, 10x ea nv Grn, 15x ea        [place] PNF    Y, 10x ea Y TB, 15x ea        iso abd w/ gait belt at feet    3"x10 nv        iso add w/ solid ball at feet    3"x10 nv        Ther Activity  6/3 6/9 6/16 6/30        Supine to sit  8 min np np np                     Gait Training                                       Modalities consistent with 7wk ultrasound     Assessment:      Pregnancy at Unknown       Plan:      Initial labs drawn.  Prenatal vitamins.  Problem list reviewed and updated.    Encounter for pregnancy test, result positive  -     POCT urine pregnancy    Nausea/vomiting in pregnancy  -     doxylamine succinate (UNISOM, DOXYLAMINE,) 25 mg tablet; Take 1/2 tablet three times a day to prevent nausea/vomitting in pregnancy  -     pyridoxine, vitamin B6, (B-6) 25 MG Tab; Take 1 tablet (25 mg total) by mouth 3 (three) times daily.  Dispense: 90 tablet; Refill: 1  -     promethazine (PHENERGAN) 25 MG tablet; Take 1 tablet (25 mg total) by mouth every 6 (six) hours as needed for Nausea.  Dispense: 30 tablet; Refill: 1    Type 2 diabetes mellitus affecting pregnancy in first trimester, antepartum  -     aspirin (ECOTRIN) 81 MG EC tablet; Take 1 tablet (81 mg total) by mouth once daily. At 12 weeks start taking aspirin daily to prevent preeclampsia.  Dispense: 150 tablet; Refill: 2  -     Ambulatory referral/consult to Perinatology; Future; Expected date: 10/29/2024  -     Hemoglobin A1C; Future; Expected date: 10/22/2024  -     COMPREHENSIVE METABOLIC PANEL; Future; Expected date: 10/22/2024  -     Protein/Creatinine Ratio, Urine  -     Protein, urine, timed; Future    Class 2 severe obesity due to excess calories with serious comorbidity and body mass index (BMI) of 37.0 to 37.9 in adult  -     aspirin (ECOTRIN) 81 MG EC tablet; Take 1 tablet (81 mg total) by mouth once daily. At 12 weeks start taking aspirin daily to prevent preeclampsia.  Dispense: 150 tablet; Refill: 2  -     Ambulatory referral/consult to Perinatology; Future; Expected date: 10/29/2024    7 weeks gestation of pregnancy    Advanced maternal age, 1st pregnancy, first trimester  -     aspirin (ECOTRIN) 81 MG EC tablet; Take 1 tablet (81 mg total) by mouth once daily. At 12 weeks start taking aspirin daily to prevent preeclampsia.  Dispense: 150 tablet; Refill: 2  -      Ambulatory referral/consult to Perinatology; Future; Expected date: 10/29/2024    Supervision of high-risk pregnancy of elderly primigravida (>= 35 years old at delivery), first trimester  -     Urine culture  -     C. trachomatis/N. gonorrhoeae by AMP DNA [facility]; Urine  -     Trichomonas vaginalis, RNA, Qual, Urine  -     Varicella Zoster Antibody, IgG; Future; Expected date: 10/22/2024  -     Type & Screen - Ob Profile; Future; Expected date: 10/22/2024  -     Rubella Antibody, IgG; Future; Expected date: 10/22/2024  -     HIV 1/2 Ag/Ab (4th Gen); Future; Expected date: 10/22/2024  -     Hepatitis C Antibody; Future; Expected date: 10/22/2024  -     Hepatitis B Surface Antigen; Future; Expected date: 10/22/2024  -     CBC Auto Differential; Future; Expected date: 10/22/2024  -     Ambulatory referral/consult to Perinatology; Future; Expected date: 10/29/2024  -     Treponema Pallidium Antibodies IgG, IgM; Future; Expected date: 10/22/2024      Follow up in 4 weeks.    I reviewed today her blood pressure, weight gain, urine results and  fetal heart rate.  I have reviewed the previous labs and ultrasound with the patient.   I have answered questions to her satisfaction and total of 20 minutes spend today

## 2024-10-25 ENCOUNTER — TELEPHONE (OUTPATIENT)
Dept: GYNECOLOGY | Facility: CLINIC | Age: 59
End: 2024-10-25

## 2024-10-25 NOTE — TELEPHONE ENCOUNTER
Pt. Unsure of what She wants to do.  Pt. Might continue Veozah since She just paid for script but also has combi patch 3 months worth at home  Pt. to call back if She stops the Veozah.

## 2024-11-04 ENCOUNTER — CONSULT (OUTPATIENT)
Dept: GASTROENTEROLOGY | Facility: CLINIC | Age: 59
End: 2024-11-04
Payer: COMMERCIAL

## 2024-11-04 ENCOUNTER — TELEPHONE (OUTPATIENT)
Dept: GASTROENTEROLOGY | Facility: CLINIC | Age: 59
End: 2024-11-04

## 2024-11-04 VITALS
BODY MASS INDEX: 27.48 KG/M2 | SYSTOLIC BLOOD PRESSURE: 130 MMHG | WEIGHT: 140 LBS | HEART RATE: 84 BPM | HEIGHT: 60 IN | DIASTOLIC BLOOD PRESSURE: 62 MMHG

## 2024-11-04 DIAGNOSIS — K21.9 GASTROESOPHAGEAL REFLUX DISEASE WITHOUT ESOPHAGITIS: Primary | ICD-10-CM

## 2024-11-04 DIAGNOSIS — Z12.11 COLON CANCER SCREENING: ICD-10-CM

## 2024-11-04 PROCEDURE — 99244 OFF/OP CNSLTJ NEW/EST MOD 40: CPT | Performed by: INTERNAL MEDICINE

## 2024-11-04 NOTE — TELEPHONE ENCOUNTER
Scheduled date of EGD (as of today) December 27  Physician performing: Dr. Thakkar  Location of procedure:    Instructions given to patient: EGD  Clearances: None  Diabetic: No

## 2024-11-04 NOTE — ASSESSMENT & PLAN NOTE
-Some's are infrequent in nature but she is almost 60 and is concerned, she does drink alcohol would be her risk factor for Friedman's, reasonable to add EGD onto her upcoming screening colonoscopy, further recommendations based on that result  -Continue PPI as needed    Orders:      EGD; Future

## 2024-11-04 NOTE — PROGRESS NOTES
"Ambulatory Visit  Name: Trinity Parra      : 1965      MRN: [id number]  Encounter Provider: Anna Marie Thakkar DO  Encounter Date: 2024   Encounter department: [facility] [place]    Assessment & Plan  Gastroesophageal reflux disease without esophagitis  -Some's are infrequent in nature but she is almost 60 and is concerned, she does drink alcohol would be her risk factor for Friedman's, reasonable to add EGD onto her upcoming screening colonoscopy, further recommendations based on that result  -Continue PPI as needed    Orders:      EGD; Future    Colon cancer screening  -He is average risk for colon cancer as she denies family history of colon cancer       Follow-up in 6 months time or as needed  History of Present Illness     Trinity Parra is a 59 y.o. female who presents for evaluation.  Referred by her OB/GYN.  She reports she has reflux symptoms this summer and then takes omeprazole for a few months.  Think symptoms are made worse with stress.  Reports some dysphagia to foods and also has some increased reflux symptoms or difficulty swallowing if she is trying to wash her hair.    Reports seeing a dentist a few years ago and had a filling that she thinks caused TMJ and now wears a protective mouthpiece.    She is a non smoker and drinks alcohol occasionally.    Weight is stable currently but did lose weight during covid but put on 5 lbs this summer eating and drinking this summer.      Denies any family history of GI tract cancers.    Denies rectal bleeding.      Had one colonoscopy at age 50 and denies a history of polyps.        History obtained from : patient  Review of Systems        Objective     /62   Pulse 84   Ht 4' 11.5\" (1.511 m)   Wt 63.5 kg (140 lb)   LMP 2017   BMI 27.80 kg/m²     Physical Exam    Answers submitted by the patient for this visit:  Abdominal Pain Questionnaire (Submitted on 2024)  Chief Complaint: " Abdominal pain  anorexia: No  belching: No  flatus: No  hematochezia: No  melena: No  weight loss: No  Diagnostic workup: CT scan, ultrasound

## 2024-12-10 ENCOUNTER — TELEPHONE (OUTPATIENT)
Dept: GASTROENTEROLOGY | Facility: CLINIC | Age: 59
End: 2024-12-10

## 2024-12-10 NOTE — TELEPHONE ENCOUNTER
Pt returning call to add EGD/Colonoscopy together, was able to put them together on her orig date dec 27, Dr. Thakkar, [place].  Pt inquiring on CPT codes for colonoscopy, please contact her back to provide.

## 2024-12-10 NOTE — TELEPHONE ENCOUNTER
Called and left a message for the patient to call back to see if the patient wanted to have both the EGD and Colonoscopy completed on 12/27/24. As of now the EGD is scheduled for 12/27/24 and the Colonoscopy is scheduled for 1/14/25.

## 2024-12-11 ENCOUNTER — TELEPHONE (OUTPATIENT)
Age: 59
End: 2024-12-11

## 2024-12-11 NOTE — TELEPHONE ENCOUNTER
Sp w/ pt provided codes - pt is going to double check with her insurance company but is planning to procedure with the double on 12/27.

## 2024-12-11 NOTE — TELEPHONE ENCOUNTER
Patient calling in because she needs all the codes for her upcoming procedures. She has the codes for the colonoscopy and needs to the codes for the EDG including the Diagnosis code, procedure code and anesthesia code.  Please call patient back with requested information

## 2024-12-26 RX ORDER — SODIUM CHLORIDE, SODIUM LACTATE, POTASSIUM CHLORIDE, CALCIUM CHLORIDE 600; 310; 30; 20 MG/100ML; MG/100ML; MG/100ML; MG/100ML
50 INJECTION, SOLUTION INTRAVENOUS CONTINUOUS
Status: CANCELLED | OUTPATIENT
Start: 2024-12-26

## 2024-12-27 ENCOUNTER — HOSPITAL ENCOUNTER (OUTPATIENT)
Dept: GASTROENTEROLOGY | Facility: HOSPITAL | Age: 59
Setting detail: OUTPATIENT SURGERY
End: 2024-12-27
Attending: INTERNAL MEDICINE
Payer: COMMERCIAL

## 2024-12-27 ENCOUNTER — ANESTHESIA (OUTPATIENT)
Dept: GASTROENTEROLOGY | Facility: HOSPITAL | Age: 59
End: 2024-12-27
Payer: COMMERCIAL

## 2024-12-27 ENCOUNTER — ANESTHESIA EVENT (OUTPATIENT)
Dept: GASTROENTEROLOGY | Facility: HOSPITAL | Age: 59
End: 2024-12-27
Payer: COMMERCIAL

## 2024-12-27 VITALS
RESPIRATION RATE: 16 BRPM | TEMPERATURE: 97 F | HEART RATE: 85 BPM | DIASTOLIC BLOOD PRESSURE: 59 MMHG | SYSTOLIC BLOOD PRESSURE: 105 MMHG | OXYGEN SATURATION: 100 %

## 2024-12-27 DIAGNOSIS — K21.9 GASTROESOPHAGEAL REFLUX DISEASE WITHOUT ESOPHAGITIS: ICD-10-CM

## 2024-12-27 DIAGNOSIS — Z12.11 SCREENING FOR COLON CANCER: ICD-10-CM

## 2024-12-27 PROCEDURE — 43239 EGD BIOPSY SINGLE/MULTIPLE: CPT | Performed by: INTERNAL MEDICINE

## 2024-12-27 PROCEDURE — 88342 IMHCHEM/IMCYTCHM 1ST ANTB: CPT | Performed by: PATHOLOGY

## 2024-12-27 PROCEDURE — G0121 COLON CA SCRN NOT HI RSK IND: HCPCS | Performed by: INTERNAL MEDICINE

## 2024-12-27 PROCEDURE — 88305 TISSUE EXAM BY PATHOLOGIST: CPT | Performed by: PATHOLOGY

## 2024-12-27 RX ORDER — SODIUM CHLORIDE, SODIUM LACTATE, POTASSIUM CHLORIDE, CALCIUM CHLORIDE 600; 310; 30; 20 MG/100ML; MG/100ML; MG/100ML; MG/100ML
50 INJECTION, SOLUTION INTRAVENOUS CONTINUOUS
Status: DISPENSED | OUTPATIENT
Start: 2024-12-27

## 2024-12-27 RX ORDER — LIDOCAINE HYDROCHLORIDE 20 MG/ML
INJECTION, SOLUTION EPIDURAL; INFILTRATION; INTRACAUDAL; PERINEURAL AS NEEDED
Status: DISCONTINUED | OUTPATIENT
Start: 2024-12-27 | End: 2024-12-27

## 2024-12-27 RX ORDER — PROPOFOL 10 MG/ML
INJECTION, EMULSION INTRAVENOUS AS NEEDED
Status: DISCONTINUED | OUTPATIENT
Start: 2024-12-27 | End: 2024-12-27

## 2024-12-27 RX ADMIN — SODIUM CHLORIDE, SODIUM LACTATE, POTASSIUM CHLORIDE, AND CALCIUM CHLORIDE 50 ML/HR: .6; .31; .03; .02 INJECTION, SOLUTION INTRAVENOUS at 09:56

## 2024-12-27 RX ADMIN — LIDOCAINE HYDROCHLORIDE 100 MG: 20 INJECTION, SOLUTION EPIDURAL; INFILTRATION; INTRACAUDAL at 10:25

## 2024-12-27 RX ADMIN — PROPOFOL 30 MG: 10 INJECTION, EMULSION INTRAVENOUS at 10:35

## 2024-12-27 RX ADMIN — PROPOFOL 150 MG: 10 INJECTION, EMULSION INTRAVENOUS at 10:25

## 2024-12-27 RX ADMIN — PROPOFOL 30 MG: 10 INJECTION, EMULSION INTRAVENOUS at 10:31

## 2024-12-27 RX ADMIN — PROPOFOL 30 MG: 10 INJECTION, EMULSION INTRAVENOUS at 10:28

## 2024-12-27 NOTE — ANESTHESIA PREPROCEDURE EVALUATION
Procedure:  COLONOSCOPY  EGD    Relevant Problems   GI/HEPATIC   (+) Gastroesophageal reflux disease      Eye   (+) Anisocoria      Other   (+) Recurrent herpes simplex        Physical Exam    Airway    Mallampati score: II  TM Distance: >3 FB  Neck ROM: full     Dental   No notable dental hx     Cardiovascular  Cardiovascular exam normal    Pulmonary  Pulmonary exam normal     Other Findings  post-pubertal.      Anesthesia Plan  ASA Score- 2     Anesthesia Type- IV sedation with anesthesia with ASA Monitors.         Additional Monitors:     Airway Plan:            Plan Factors-Exercise tolerance (METS): >4 METS.    Chart reviewed. EKG reviewed.  Existing labs reviewed. Patient summary reviewed.    Patient is not a current smoker. Patient not instructed to abstain from smoking on day of procedure. Patient did not smoke on day of surgery.            Induction-     Postoperative Plan-     Perioperative Resuscitation Plan - Level 1 - Full Code.       Informed Consent- Anesthetic plan and risks discussed with patient.  I personally reviewed this patient with the CRNA. Discussed and agreed on the Anesthesia Plan with the CRNA..        Lab Results   Component Value Date    HGBA1C 5.0 10/15/2022       Lab Results   Component Value Date    K 3.8 07/15/2024     07/15/2024    CO2 33 (H) 07/15/2024    BUN 15 07/15/2024    CREATININE 0.82 07/15/2024    CALCIUM 10.1 07/15/2024    AST 21 09/23/2024    ALT 16 09/23/2024    ALKPHOS 61 09/23/2024    EGFR 82 07/15/2024       Lab Results   Component Value Date    WBC 7.0 10/15/2022    HGB 13.6 10/15/2022    HCT 39.2 10/15/2022    MCV 93.3 10/15/2022     10/15/2022   Echo 2024   Left Ventricle: Left ventricular cavity size is normal. Wall thickness is normal. The left ventricular ejection fraction is 70%. Systolic function is vigorous. Wall motion is normal. Diastolic function is normal.    Mitral Valve: There is mild regurgitation.    Tricuspid Valve: There is mild  regurgitation.    Pulmonic Valve: There is trace regurgitation.

## 2024-12-27 NOTE — ANESTHESIA POSTPROCEDURE EVALUATION
Post-Op Assessment Note    CV Status:  Stable    Pain management: satisfactory to patient       Mental Status:  Alert and awake   Hydration Status:  Stable   PONV Controlled:  None   Airway Patency:  Patent     Post Op Vitals Reviewed: Yes    No anethesia notable event occurred.    Staff: CRNA           Last Filed PACU Vitals:  Vitals Value Taken Time   Temp 97 °F (36.1 °C) 12/27/24 1050   Pulse 74 12/27/24 1050   /59 12/27/24 1050   Resp 14 12/27/24 1050   SpO2 100 % 12/27/24 1050       Modified Maciej:  Activity: 2 (12/27/2024  9:48 AM)  Respiration: 2 (12/27/2024  9:48 AM)  Circulation: 2 (12/27/2024  9:48 AM)  Consciousness: 2 (12/27/2024  9:48 AM)  Oxygen Saturation: 2 (12/27/2024  9:48 AM)  Modified Maciej Score: 10 (12/27/2024  9:48 AM)

## 2024-12-27 NOTE — H&P
History and Physical - [facility]  Trinity Parra 59 y.o. female MRN: [id number]        HPI: Trinity Parra is a 59 y.o. year old female who presents for GERD and colon cancer screening.      REVIEW OF SYSTEMS: Per the HPI, and otherwise unremarkable.    Historical Information   Past Medical History:   Diagnosis Date    Carbon monoxide exposure 05/31/2024    Fibroids 1994    UTERUS     Past Surgical History:   Procedure Laterality Date    ESOPHAGOGASTRODUODENOSCOPY      MYOMECTOMY      OTHER SURGICAL HISTORY      fatty tissue removal    WISDOM TOOTH EXTRACTION       Social History   Social History     Substance and Sexual Activity   Alcohol Use Yes     Social History     Substance and Sexual Activity   Drug Use No     Social History     Tobacco Use   Smoking Status Never   Smokeless Tobacco Never     Family History   Problem Relation Age of Onset    Hypertension Mother     Lung cancer Father 65    No Known Problems Sister     Lymphoma Maternal Grandmother         age unknown    No Known Problems Maternal Grandfather     No Known Problems Paternal Grandmother     No Known Problems Paternal Grandfather     No Known Problems Maternal Aunt     No Known Problems Maternal Aunt     No Known Problems Maternal Aunt     No Known Problems Maternal Aunt     Kidney failure Maternal Uncle     No Known Problems Paternal Aunt     No Known Problems Paternal Aunt     No Known Problems Paternal Aunt     Heart attack Family     Stroke Family        Meds/Allergies       Current Outpatient Medications:     Ascorbic Acid (RALPH-C PO)    cholecalciferol (VITAMIN D3) 1,000 units tablet    estradiol (ESTRACE) 0.1 mg/g vaginal cream    fezolinetant (Veozah) tablet    hydrocortisone 2.5 % cream    loratadine-pseudoephedrine (CLARITIN-D 24-HOUR)  mg per 24 hr tablet    Multiple Vitamins-Minerals (MULTIVITAMIN ADULT PO)    Omega-3 Fatty Acids (fish oil) 1,000 mg    triamcinolone (KENALOG) 0.1 % ointment     valACYclovir (VALTREX) 1,000 mg tablet    omeprazole (PriLOSEC) 40 MG capsule    Current Facility-Administered Medications:     lactated ringers infusion, 50 mL/hr, Intravenous, Continuous, 50 mL/hr at 12/27/24 0956    Allergies   Allergen Reactions    Cefuroxime      Other reaction(s): Hives    Cetirizine      Other reaction(s): sleepy  Other reaction(s): sleepy    Fexofenadine      Other reaction(s): palpitations  Other reaction(s): palpitations    Penicillins     Sulfa Antibiotics     Sulfanilamide      Other reaction(s): Hives       Objective     /67   Pulse 85   Temp (!) 97.2 °F (36.2 °C) (Temporal)   Resp 16   LMP 07/03/2017   SpO2 100%       PHYSICAL EXAM    Gen: NAD  Head: NCAT  CV: RRR  CHEST: Clear  ABD: soft, NT/ND  EXT: no edema      ASSESSMENT/PLAN:  This is a 59 y.o. year old female here for EGD/Colonoscopy, and she is stable and optimized for her procedure.

## 2024-12-31 PROCEDURE — 88342 IMHCHEM/IMCYTCHM 1ST ANTB: CPT | Performed by: PATHOLOGY

## 2024-12-31 PROCEDURE — 88305 TISSUE EXAM BY PATHOLOGIST: CPT | Performed by: PATHOLOGY

## 2025-01-05 ENCOUNTER — OFFICE VISIT (OUTPATIENT)
Dept: URGENT CARE | Facility: MEDICAL CENTER | Age: 60
End: 2025-01-05
Payer: COMMERCIAL

## 2025-01-05 VITALS
OXYGEN SATURATION: 100 % | HEART RATE: 85 BPM | TEMPERATURE: 97.5 F | SYSTOLIC BLOOD PRESSURE: 129 MMHG | RESPIRATION RATE: 18 BRPM | DIASTOLIC BLOOD PRESSURE: 60 MMHG

## 2025-01-05 DIAGNOSIS — H10.89 OTHER CONJUNCTIVITIS OF RIGHT EYE: Primary | ICD-10-CM

## 2025-01-05 PROCEDURE — S9083 URGENT CARE CENTER GLOBAL: HCPCS | Performed by: PHYSICIAN ASSISTANT

## 2025-01-05 PROCEDURE — G0382 LEV 3 HOSP TYPE B ED VISIT: HCPCS | Performed by: PHYSICIAN ASSISTANT

## 2025-01-05 RX ORDER — TOBRAMYCIN AND DEXAMETHASONE 3; 1 MG/ML; MG/ML
1 SUSPENSION/ DROPS OPHTHALMIC 4 TIMES DAILY
Qty: 5 ML | Refills: 0 | Status: SHIPPED | OUTPATIENT
Start: 2025-01-05 | End: 2025-01-12

## 2025-01-05 NOTE — PROGRESS NOTES
[facility]        NAME: Trinity Parra is a 59 y.o. female  : 1965    MRN: [id number]  DATE: 2025  TIME: 10:20 AM    Assessment and Plan   Other conjunctivitis of right eye [H10.89]  1. Other conjunctivitis of right eye  tobramycin-dexamethasone (TOBRADEX) ophthalmic suspension            Patient Instructions       Follow up with PCP as needed.    If tests have been performed at [facility], our office will contact you with results if changes need to be made to the care plan discussed with you at the visit.  [facility]    Chief Complaint     Chief Complaint   Patient presents with    Eye Pain     Patient c/o right eye redness with irration x 1 week          History of Present Illness       Had colonoscopy last week and states that her eyes started bothering her after that.    Eye Pain   The right eye is affected. This is a new problem. The current episode started in the past 7 days. The problem occurs constantly. The problem has been unchanged. The injury mechanism is unknown. The pain is moderate. There is No known exposure to pink eye. She Does not wear contacts. Associated symptoms include a foreign body sensation. Pertinent negatives include no blurred vision, eye discharge, double vision, eye redness, fever, itching, nausea, photophobia, recent URI or vomiting. She has tried commercial eye wash for the symptoms. The treatment provided no relief.       Review of Systems   Review of Systems   Constitutional:  Negative for fever.   Eyes:  Positive for pain. Negative for blurred vision, double vision, photophobia, discharge, redness and itching.   Gastrointestinal:  Negative for nausea and vomiting.   All other systems reviewed and are negative.        Current Medications       Current Outpatient Medications:     tobramycin-dexamethasone (TOBRADEX) ophthalmic suspension, Administer 1 drop to the right eye 4 (four) times a day for 7 days, Disp:  5 mL, Rfl: 0    Ascorbic Acid (RALPH-C PO), Take by mouth  , Disp: , Rfl:     cholecalciferol (VITAMIN D3) 1,000 units tablet, Take 1,000 Units by mouth daily DAILY, Disp: , Rfl:     estradiol (ESTRACE) 0.1 mg/g vaginal cream, Insert 1 g into the vagina once a week, Disp: 42.5 g, Rfl: 3    fezolinetant (Veozah) tablet, Take 1 tablet (45 mg total) by mouth daily, Disp: 90 tablet, Rfl: 3    hydrocortisone 2.5 % cream, Apply topically 2 (two) times a day For 2-3 weeks, Disp: 28 g, Rfl: 1    loratadine-pseudoephedrine (CLARITIN-D 24-HOUR)  mg per 24 hr tablet, Take 1 tablet by mouth daily, Disp: 90 tablet, Rfl: 3    Multiple Vitamins-Minerals (MULTIVITAMIN ADULT PO), every 24 hours, Disp: , Rfl:     Omega-3 Fatty Acids (fish oil) 1,000 mg, Take by mouth, Disp: , Rfl:     omeprazole (PriLOSEC) 40 MG capsule, Take 1 capsule (40 mg total) by mouth daily before breakfast (Patient taking differently: Take 40 mg by mouth daily before breakfast PRN), Disp: 90 capsule, Rfl: 5    triamcinolone (KENALOG) 0.1 % ointment, Apply topically 2 (two) times a day To hands, Disp: 80 g, Rfl: 2    valACYclovir (VALTREX) 1,000 mg tablet, One bid prn herpes outbreak, Disp: 30 tablet, Rfl: 6    Current Allergies     Allergies as of 01/05/2025 - Reviewed 01/05/2025   Allergen Reaction Noted    Cefuroxime  02/21/2013    Cetirizine  04/27/2018    Fexofenadine  04/27/2018    Penicillins  04/27/2018    Sulfa antibiotics  02/21/2013    Sulfanilamide  04/27/2018            The following portions of the patient's history were reviewed and updated as appropriate: allergies, current medications, past family history, past medical history, past social history, past surgical history and problem list.     Past Medical History:   Diagnosis Date    Carbon monoxide exposure 05/31/2024    Fibroids 1994    UTERUS       Past Surgical History:   Procedure Laterality Date    ESOPHAGOGASTRODUODENOSCOPY      MYOMECTOMY      OTHER SURGICAL HISTORY      fatty  tissue removal    WISDOM TOOTH EXTRACTION         Family History   Problem Relation Age of Onset    Hypertension Mother     Lung cancer Father 65    No Known Problems Sister     Lymphoma Maternal Grandmother         age unknown    No Known Problems Maternal Grandfather     No Known Problems Paternal Grandmother     No Known Problems Paternal Grandfather     No Known Problems Maternal Aunt     No Known Problems Maternal Aunt     No Known Problems Maternal Aunt     No Known Problems Maternal Aunt     Kidney failure Maternal Uncle     No Known Problems Paternal Aunt     No Known Problems Paternal Aunt     No Known Problems Paternal Aunt     Heart attack Family     Stroke Family          Medications have been verified.        Objective   /60   Pulse 85   Temp 97.5 °F (36.4 °C)   Resp 18   LMP 07/03/2017   SpO2 100%   Patient's last menstrual period was 07/03/2017.       Physical Exam     Physical Exam  Vitals and nursing note reviewed.   Constitutional:       Appearance: Normal appearance. She is normal weight.   Eyes:      General:         Right eye: Discharge present.         Left eye: No discharge.      Extraocular Movements: Extraocular movements intact.      Pupils: Pupils are equal, round, and reactive to light.   Cardiovascular:      Rate and Rhythm: Normal rate and regular rhythm.      Pulses: Normal pulses.      Heart sounds: Normal heart sounds.   Pulmonary:      Effort: Pulmonary effort is normal.      Breath sounds: Normal breath sounds.   Neurological:      Mental Status: She is alert.       Right eye irritation conjunctival.  Fluorescein and tetracaine utilized and negative for corneal abrasion.  Fluorescein fully irrigated.  Vision was 20/25 on the right 20/30 on the left and bilateral 20/25

## 2025-01-06 ENCOUNTER — RESULTS FOLLOW-UP (OUTPATIENT)
Dept: GASTROENTEROLOGY | Facility: CLINIC | Age: 60
End: 2025-01-06

## 2025-01-06 ENCOUNTER — TELEPHONE (OUTPATIENT)
Dept: FAMILY MEDICINE CLINIC | Facility: CLINIC | Age: 60
End: 2025-01-06

## 2025-01-06 DIAGNOSIS — B00.9 HERPES: ICD-10-CM

## 2025-01-06 NOTE — TELEPHONE ENCOUNTER
Reason for call:   [x] Refill   [] Prior Auth  [] Other:     Office:   [x] PCP/Provider -   [] Specialty/Provider -     Medication:   valACYclovir (VALTREX) 1,000 mg tablet       Dose/Frequency: One bid prn herpes outbreak     Quantity: 30    Pharmacy: [id number] - [place] - [address] [place] [address] [phone]    Does the patient have enough for 3 days?   [x] Yes   [] No - Send as HP to POD

## 2025-01-07 RX ORDER — VALACYCLOVIR HYDROCHLORIDE 1 G/1
TABLET, FILM COATED ORAL
Qty: 30 TABLET | Refills: 5 | Status: SHIPPED | OUTPATIENT
Start: 2025-01-07 | End: 2027-01-07

## 2025-01-09 NOTE — TELEPHONE ENCOUNTER
Let pharmacy know that patient is to take 1000mg twice daily for 7-10 days at first onset of acute herpes flare.

## 2025-01-09 NOTE — TELEPHONE ENCOUNTER
[facility] pharmacy called to verify duration of therapy and instructions on valACYclovir (VALTREX) 1,000 mg tablet.  Please review and reach out to pharmacy for clarification.

## 2025-01-20 ENCOUNTER — TELEPHONE (OUTPATIENT)
Age: 60
End: 2025-01-20

## 2025-01-20 NOTE — TELEPHONE ENCOUNTER
Pt called for results info. On biopsy. I read the note Dr. Thakkar has in the chart. And advised pt of her follow-up appt. On 05/13.

## 2025-01-30 DIAGNOSIS — J30.9 ALLERGIC RHINITIS, UNSPECIFIED SEASONALITY, UNSPECIFIED TRIGGER: ICD-10-CM

## 2025-01-30 NOTE — TELEPHONE ENCOUNTER
Reason for call:   [x] Refill   [] Prior Auth  [] Other:     Office:   [x] PCP/Provider - Ashly Davidson   [] Specialty/Provider -     Medication: loratadine-pseudoephedrine (CLARITIN-D 24-HOUR)  mg per 24 hr tablet     Dose/Frequency:  Take 1 tablet by mouth daily,     Quantity: 90    [id number] [address]      Does the patient have enough for 3 days?   [] Yes   [x] No - Send as HP to POD

## 2025-02-05 ENCOUNTER — TELEPHONE (OUTPATIENT)
Dept: GYNECOLOGY | Facility: CLINIC | Age: 60
End: 2025-02-05

## 2025-02-05 NOTE — TELEPHONE ENCOUNTER
PA for Veozah SUBMITTED to [facility]    via    [x]CMM-KEY: [id number]  []Surescripts-Case ID #   []Availity-Auth ID # NDC #   []Faxed to plan   []Other website   []Phone call Case ID #     []PA sent as URGENT    All office notes, labs and other pertaining documents and studies sent. Clinical questions answered. Awaiting determination from insurance company.     Turnaround time for your insurance to make a decision on your Prior Authorization can take 7-21 business days.

## 2025-02-05 NOTE — TELEPHONE ENCOUNTER
Fax received for prior auth for patients veozah.  Forms scanned into media manager.  Please start prior auth

## 2025-02-07 ENCOUNTER — TELEPHONE (OUTPATIENT)
Age: 60
End: 2025-02-07

## 2025-02-07 NOTE — TELEPHONE ENCOUNTER
Pt calling, has d/c Veozah due to continuation of hot flashes. She would like to discuss an alternative medication with Dr Daniel.

## 2025-02-07 NOTE — TELEPHONE ENCOUNTER
PA for Veozah APPROVED     Date(s) approved 2/5/2025 - 2/5/2026    Case # [id number]    Patient advised by          []MyChart Message  []Phone call   []LMOM  []L/M to call office as no active Communication consent on file  [x]Unable to leave detailed message as VM not approved on Communication consent       Pharmacy advised by    [x]Fax  []Phone call    Approval letter scanned into Media Yes

## 2025-03-16 ENCOUNTER — OFFICE VISIT (OUTPATIENT)
Dept: URGENT CARE | Facility: MEDICAL CENTER | Age: 60
End: 2025-03-16
Payer: COMMERCIAL

## 2025-03-16 VITALS
OXYGEN SATURATION: 100 % | SYSTOLIC BLOOD PRESSURE: 162 MMHG | TEMPERATURE: 97.7 F | HEART RATE: 102 BPM | RESPIRATION RATE: 18 BRPM | DIASTOLIC BLOOD PRESSURE: 72 MMHG

## 2025-03-16 DIAGNOSIS — J01.10 ACUTE NON-RECURRENT FRONTAL SINUSITIS: Primary | ICD-10-CM

## 2025-03-16 PROCEDURE — G0382 LEV 3 HOSP TYPE B ED VISIT: HCPCS

## 2025-03-16 PROCEDURE — S9083 URGENT CARE CENTER GLOBAL: HCPCS

## 2025-03-16 RX ORDER — METHYLPREDNISOLONE 4 MG/1
TABLET ORAL
Qty: 1 EACH | Refills: 0 | Status: SHIPPED | OUTPATIENT
Start: 2025-03-16

## 2025-03-16 RX ORDER — DOXYCYCLINE 100 MG/1
100 CAPSULE ORAL EVERY 12 HOURS SCHEDULED
Qty: 14 CAPSULE | Refills: 0 | Status: SHIPPED | OUTPATIENT
Start: 2025-03-16 | End: 2025-03-23

## 2025-03-16 RX ORDER — AZITHROMYCIN 250 MG/1
TABLET, FILM COATED ORAL
COMMUNITY
Start: 2025-03-12

## 2025-03-16 NOTE — PATIENT INSTRUCTIONS
Discontinue azithromycin.  Prescribed course of doxycycline, take as directed.  Avoid direct sunlight while taking this medication.  Prescribed Medrol Dosepak, take as directed.  Avoid use of NSAIDs while taking this medication (ibuprofen, Aleve).  May take Tylenol.  Fever/Pain: Over the counter Tylenol and/or Motrin as directed on packaging.  Cough: Mucinex can help to thin mucus, making it easier to cough up. Delsym or Robitussin can help to suppress the cough. Utilizing a vaporizer/humidifier may help, as well as increasing fluids.  Sore Throat: Salt water gargles with 1 teaspoon of salt dissolved in 6-8 oz of water, honey, drinking plenty of liquids, eating soft foods. If severe, can utilize OTC chloraseptic spray.  Nasal Congestion: Over the counter allergy medication like Claritin, Allegra or Zyrtec can help with nasal congestion and post nasal drip.  Over the counter saline or steroid nasal sprays like Flonase can help with nasal congestion and post nasal drip as well. Over the counter decongestants such as Sudafed may also help.  Upset Stomach: Drink plenty of fluids. May utilize Gatorade, Pedialyte, or other electrolyte solutions to supplement. Eat bland foods (ex: BRAT - bananas, rice, applesauce, toast) and slowly advance to other foods as tolerated.  Please note, if you have heart issues or high blood pressure, the cough/cold medication of choice is Coricidin. Talk to your doctor before trying any new medications.    Follow up with PCP in 3-5 days.  Proceed to  ER if symptoms worsen.    If tests are performed, our office will contact you with results only if changes need to made to the care plan discussed with you at the visit. You can review your full results on [facility].    Patient Education     Sinusitis in adults   The Basics   Written by the doctors and editors at [facility]   What is sinusitis? -- Sinusitis is a condition that can cause a stuffy nose, pain in the face, and discharge or  "\"mucus\" from the nose.  The sinuses are hollow areas in the bones of the face (figure 1). They have a thin lining that normally makes a small amount of mucus. When this lining gets irritated or infected, it swells and makes extra mucus. This causes symptoms.  Sinusitis usually happens after a person gets sick with a cold. The germs causing the cold can infect the sinuses, too. Sometimes, other germs can be the cause of the infection. Often, a person feels like their cold is getting better. But then, they get sinusitis and begin to feel sick again.  What are the symptoms of sinusitis? -- Common symptoms of sinusitis include:   Stuffy or blocked nose   Thick white, yellow, or green discharge from the nose   Pain in the teeth   Pain or pressure in the face - This often feels worse when a person bends forward.  People with sinusitis can also have other symptoms, such as:   Fever   Cough   Trouble smelling   Ear pressure or fullness   Headache   Bad breath   Feeling tired  Most of the time, symptoms start to improve in 7 to 10 days.  Should I see a doctor or nurse? -- See your doctor or nurse if your symptoms last more than 10 days, or if your symptoms first get better but then get worse.  Rarely, sinusitis can lead to serious problems. See your doctor or nurse right away (do not wait 10 days) if you have:   Fever higher than 102°F (38.9°C)   Sudden and severe pain in the face and head   Trouble seeing, or seeing double   Trouble thinking clearly   Swelling or redness around 1 or both eyes   Stiff neck  Is there anything I can do on my own to feel better? -- Yes. To help with your symptoms, you can:   Take an over-the-counter pain reliever to reduce the pain.   Rinse your nose and sinuses with salt water a few times a day - Ask your doctor or nurse about the best way to do this.   Drink plenty of fluids - Staying hydrated might help to thin the mucus and make it drain more easily.  Your doctor might also recommend a " "steroid nose spray to reduce the swelling in your nose, especially if you have allergies. Talk to your doctor if you are thinking of using a steroid spray.  How is sinusitis treated? -- Most of the time, sinusitis does not need to be treated with antibiotic medicines. This is because most sinusitis is caused by viruses, not bacteria, and antibiotics do not kill viruses. In fact, even sinusitis caused by bacteria will usually get better on its own without antibiotics.  Some people with sinusitis do need treatment with antibiotics. If your symptoms have not improved after 10 days, ask your doctor if you should take antibiotics. They might recommend that you wait 1 more week to see if your symptoms improve. But if you have symptoms such as a fever or a lot of pain, they might prescribe antibiotics. If you do get antibiotics, follow all of your doctor's instructions about taking them.  What if my symptoms do not get better? -- If your symptoms do not get better, talk with your doctor or nurse. They might order tests to figure out why you still have symptoms. These can include:   CT scan or other imaging tests - Imaging tests create pictures of the inside of the body.   A test to look inside the sinuses - For this test, a doctor puts a thin tube with a camera on the end into the nose and up into the sinuses.  Some people get a lot of sinus infections or have symptoms that last at least 3 months. These people can have a different type of sinusitis called \"chronic sinusitis.\" Chronic sinusitis can be caused by different things. For example, some people have growths inside their nose or sinuses that are called \"polyps.\" Other people have allergies that cause their symptoms.  Chronic sinusitis can be treated in different ways. If you have chronic sinusitis, talk with your doctor about which treatments are right for you.  All topics are updated as new evidence becomes available and our peer review process is complete.  This " topic retrieved from [employer] on: Feb 28, 2024.  Topic 02464 Version 21.0  Release: 32.2.4 - C32.58  © 2024 UpToDate, Inc. and/or its affiliates. All rights reserved.  figure 1: Sinuses of the face     The sinuses are hollow areas in the bones of the face. This drawing shows where the sinuses are, from the side and front views. There are 4 pairs of sinuses, named for the bones around them: sphenoid, frontal, ethmoid, and maxillary.  Graphic 072509 Version 3.0  Consumer Information Use and Disclaimer   Disclaimer: This generalized information is a limited summary of diagnosis, treatment, and/or medication information. It is not meant to be comprehensive and should be used as a tool to help the user understand and/or assess potential diagnostic and treatment options. It does NOT include all information about conditions, treatments, medications, side effects, or risks that may apply to a specific patient. It is not intended to be medical advice or a substitute for the medical advice, diagnosis, or treatment of a health care provider based on the health care provider's examination and assessment of a patient's specific and unique circumstances. Patients must speak with a health care provider for complete information about their health, medical questions, and treatment options, including any risks or benefits regarding use of medications. This information does not endorse any treatments or medications as safe, effective, or approved for treating a specific patient. UpToDate, Inc. and its affiliates disclaim any warranty or liability relating to this information or the use thereof.The use of this information is governed by the Terms of Use, available at https://www.wolterskluwer.com/en/know/clinical-effectiveness-terms. 2024© UpToDate, Inc. and its affiliates and/or licensors. All rights reserved.  Copyright   © 2024 Up[employer]Date, Inc. and/or its affiliates. All rights reserved.

## 2025-03-16 NOTE — PROGRESS NOTES
[facility]        NAME: Trinity Parra is a 60 y.o. female  : 1965    MRN: [id number]  DATE: 2025  TIME: 12:20 PM    Assessment and Plan   Acute non-recurrent frontal sinusitis [J01.10]  1. Acute non-recurrent frontal sinusitis  doxycycline hyclate (VIBRAMYCIN) 100 mg capsule    methylPREDNISolone 4 MG tablet therapy pack        Presentation concerning with potential sinusitis with treatment failure on azithromycin.  Advised to discontinue azithromycin, start doxycycline.  Also prescribed Medrol Dosepak.  Advised symptomatic treatment and PCP follow-up if no improvement in symptoms.    Patient Instructions     Discontinue azithromycin.  Prescribed course of doxycycline, take as directed.  Avoid direct sunlight while taking this medication.  Prescribed Medrol Dosepak, take as directed.  Avoid use of NSAIDs while taking this medication (ibuprofen, Aleve).  May take Tylenol.  Fever/Pain: Over the counter Tylenol and/or Motrin as directed on packaging.  Cough: Mucinex can help to thin mucus, making it easier to cough up. Delsym or Robitussin can help to suppress the cough. Utilizing a vaporizer/humidifier may help, as well as increasing fluids.  Sore Throat: Salt water gargles with 1 teaspoon of salt dissolved in 6-8 oz of water, honey, drinking plenty of liquids, eating soft foods. If severe, can utilize OTC chloraseptic spray.  Nasal Congestion: Over the counter allergy medication like Claritin, Allegra or Zyrtec can help with nasal congestion and post nasal drip.  Over the counter saline or steroid nasal sprays like Flonase can help with nasal congestion and post nasal drip as well. Over the counter decongestants such as Sudafed may also help.  Upset Stomach: Drink plenty of fluids. May utilize Gatorade, Pedialyte, or other electrolyte solutions to supplement. Eat bland foods (ex: BRAT - bananas, rice, applesauce, toast) and slowly advance to other foods as tolerated.  Please note, if  you have heart issues or high blood pressure, the cough/cold medication of choice is Coricidin. Talk to your doctor before trying any new medications.    Follow up with PCP in 3-5 days.  Proceed to  ER if symptoms worsen.    If tests are performed, our office will contact you with results only if changes need to made to the care plan discussed with you at the visit. You can review your full results on [facility].    Chief Complaint     Chief Complaint   Patient presents with    Facial Pain     Patient c/o left side facial pain , jaw pain and gum pain x 1 month. She noted that her nose has been dry.          History of Present Illness       Patient presents for evaluation of left jaw, cheek pain.  Notes this pain started about one month ago.  The pain has been constantly there, but fluctuates in intensity.  Notes pain is worse at night time, goes up to a 10/10.  Currently she would rate the pain as a 4/10.  Denies pain with chewing.  She was evaluated by her dentist twice for this issue, had x-rays performed that showed a possible abnormality with a tooth, but advised patient to be evaluated for potential sinus issues.  She was prescribed azithromycin by her dentist, which she has been taking for the past 4 days as directed.  She has also been taking Tylenol with some relief.        Review of Systems   Review of Systems   Constitutional:  Negative for appetite change, chills and fever.   HENT:  Positive for congestion and nosebleeds (in the mornings). Negative for ear discharge, ear pain, facial swelling, postnasal drip, rhinorrhea and sore throat.         + clogged sensation left ear   Eyes:  Negative for pain, discharge, redness and itching.   Respiratory:  Negative for cough, chest tightness, shortness of breath, wheezing and stridor.    Gastrointestinal:  Negative for abdominal pain, diarrhea, nausea and vomiting.   Musculoskeletal:  Negative for myalgias.   Skin:  Negative for color change and rash.    Neurological:  Positive for headaches.         Current Medications       Current Outpatient Medications:     azithromycin (ZITHROMAX) 250 mg tablet, , Disp: , Rfl:     doxycycline hyclate (VIBRAMYCIN) 100 mg capsule, Take 1 capsule (100 mg total) by mouth every 12 (twelve) hours for 7 days, Disp: 14 capsule, Rfl: 0    methylPREDNISolone 4 MG tablet therapy pack, Use as directed on package, Disp: 1 each, Rfl: 0    Ascorbic Acid (RALPH-C PO), Take by mouth  , Disp: , Rfl:     cholecalciferol (VITAMIN D3) 1,000 units tablet, Take 1,000 Units by mouth daily DAILY, Disp: , Rfl:     estradiol (ESTRACE) 0.1 mg/g vaginal cream, Insert 1 g into the vagina once a week, Disp: 42.5 g, Rfl: 3    fezolinetant (Veozah) tablet, Take 1 tablet (45 mg total) by mouth daily, Disp: 90 tablet, Rfl: 3    hydrocortisone 2.5 % cream, Apply topically 2 (two) times a day For 2-3 weeks, Disp: 28 g, Rfl: 1    loratadine-pseudoephedrine (CLARITIN-D 24-HOUR)  mg per 24 hr tablet, Take 1 tablet by mouth daily, Disp: 90 tablet, Rfl: 1    Multiple Vitamins-Minerals (MULTIVITAMIN ADULT PO), every 24 hours, Disp: , Rfl:     Omega-3 Fatty Acids (fish oil) 1,000 mg, Take by mouth, Disp: , Rfl:     omeprazole (PriLOSEC) 40 MG capsule, Take 1 capsule (40 mg total) by mouth daily before breakfast (Patient taking differently: Take 40 mg by mouth daily before breakfast PRN), Disp: 90 capsule, Rfl: 5    tobramycin-dexamethasone (TOBRADEX) ophthalmic suspension, Administer 1 drop to the right eye 4 (four) times a day for 7 days, Disp: 5 mL, Rfl: 0    triamcinolone (KENALOG) 0.1 % ointment, Apply topically 2 (two) times a day To hands, Disp: 80 g, Rfl: 2    valACYclovir (VALTREX) 1,000 mg tablet, One bid prn herpes outbreak, Disp: 30 tablet, Rfl: 5    Current Allergies     Allergies as of 03/16/2025 - Reviewed 03/16/2025   Allergen Reaction Noted    Cefuroxime  02/21/2013    Cetirizine  04/27/2018    Fexofenadine  04/27/2018    Penicillins  04/27/2018     Sulfa antibiotics  02/21/2013    Sulfanilamide  04/27/2018            The following portions of the patient's history were reviewed and updated as appropriate: allergies, current medications, past family history, past medical history, past social history, past surgical history and problem list.     Past Medical History:   Diagnosis Date    Carbon monoxide exposure 05/31/2024    Fibroids 1994    UTERUS       Past Surgical History:   Procedure Laterality Date    ESOPHAGOGASTRODUODENOSCOPY      MYOMECTOMY      OTHER SURGICAL HISTORY      fatty tissue removal    WISDOM TOOTH EXTRACTION         Family History   Problem Relation Age of Onset    Hypertension Mother     Lung cancer Father 65    No Known Problems Sister     Lymphoma Maternal Grandmother         age unknown    No Known Problems Maternal Grandfather     No Known Problems Paternal Grandmother     No Known Problems Paternal Grandfather     No Known Problems Maternal Aunt     No Known Problems Maternal Aunt     No Known Problems Maternal Aunt     No Known Problems Maternal Aunt     Kidney failure Maternal Uncle     No Known Problems Paternal Aunt     No Known Problems Paternal Aunt     No Known Problems Paternal Aunt     Heart attack Family     Stroke Family          Medications have been verified.        Objective   /72   Pulse 102   Temp 97.7 °F (36.5 °C)   Resp 18   LMP 07/03/2017   SpO2 100%        Physical Exam     Physical Exam  Vitals and nursing note reviewed.   Constitutional:       General: She is not in acute distress.     Appearance: Normal appearance. She is not ill-appearing.   HENT:      Right Ear: Tympanic membrane, ear canal and external ear normal.      Left Ear: Tympanic membrane, ear canal and external ear normal.      Nose: Nose normal. No congestion or rhinorrhea.      Mouth/Throat:      Mouth: Mucous membranes are moist.      Pharynx: No oropharyngeal exudate or posterior oropharyngeal erythema.   Eyes:      General:          Right eye: No discharge.         Left eye: No discharge.      Extraocular Movements: Extraocular movements intact.   Cardiovascular:      Rate and Rhythm: Normal rate and regular rhythm.      Pulses: Normal pulses.      Heart sounds: Normal heart sounds.   Pulmonary:      Effort: Pulmonary effort is normal. No respiratory distress.      Breath sounds: Normal breath sounds. No wheezing, rhonchi or rales.   Abdominal:      General: Abdomen is flat. Bowel sounds are normal. There is no distension.      Palpations: Abdomen is soft.      Tenderness: There is no abdominal tenderness. There is no guarding.   Musculoskeletal:      Cervical back: Neck supple.   Lymphadenopathy:      Cervical: No cervical adenopathy.   Skin:     General: Skin is warm and dry.   Neurological:      Mental Status: She is alert.

## 2025-04-02 ENCOUNTER — OFFICE VISIT (OUTPATIENT)
Age: 60
End: 2025-04-02
Payer: COMMERCIAL

## 2025-04-02 VITALS — WEIGHT: 147 LBS | BODY MASS INDEX: 29.69 KG/M2 | TEMPERATURE: 98.3 F

## 2025-04-02 DIAGNOSIS — D18.01 CHERRY ANGIOMA: ICD-10-CM

## 2025-04-02 DIAGNOSIS — L30.9 HAND DERMATITIS: ICD-10-CM

## 2025-04-02 DIAGNOSIS — D23.9 DERMATOFIBROMA: ICD-10-CM

## 2025-04-02 DIAGNOSIS — Z12.83 SKIN EXAM, SCREENING FOR CANCER: Primary | ICD-10-CM

## 2025-04-02 DIAGNOSIS — D22.9 MULTIPLE MELANOCYTIC NEVI: ICD-10-CM

## 2025-04-02 DIAGNOSIS — L60.8 MELANONYCHIA STRIATA: ICD-10-CM

## 2025-04-02 PROCEDURE — 99214 OFFICE O/P EST MOD 30 MIN: CPT

## 2025-04-02 RX ORDER — TRIAMCINOLONE ACETONIDE 1 MG/G
OINTMENT TOPICAL
Qty: 80 G | Refills: 0 | Status: SHIPPED | OUTPATIENT
Start: 2025-04-02 | End: 2025-04-04 | Stop reason: SDUPTHER

## 2025-04-02 NOTE — PATIENT INSTRUCTIONS
"  Physical Exam and Assessment/Plan by Diagnosis:     MELANONYCHIA STRIATA     Assessment and Plan:  Based on a thorough discussion of this condition and the management approach to it (including a comprehensive discussion of the known risks, side effects and potential benefits of treatment), the patient (family) agrees to implement the following specific plan:    She is to monitor for change, widening, Bond's sign (described to patient). No asymmetry or Bond's sign on any nails today  Follow up for changes     DERMATOFIBROMA     Assessment and Plan:  Based on a thorough discussion of this condition and the management approach to it (including a comprehensive discussion of the known risks, side effects and potential benefits of treatment), the patient (family) agrees to implement the following specific plan:  Reassured benign       Assessment and Plan:  A dermatofibroma is a common benign fibrous nodule that most often arises on the skin of the lower legs.  A dermatofibroma is also called a \"cutaneous fibrous histiocytoma.\"  Dermatofibromas occur at all ages and in people of every ethnicity. They are more common in women than in men.     It is not clear if dermatofibroma is a reactive process or if it is a neoplasm. The lesions are made up of proliferating fibroblasts. Histiocytes may also be involved.  They are sometimes attributed to an insect bite or ingrownhair or local trauma, but not consistently. They may be more numerous in patients with altered immunity.     Dermatofibromas most often occur on the legs and arms, but may also arise on the trunk or any site of the body.  Typical clinical features include the following:  People may have 1 or up to 15 lesions.  Size varies from 0.5-1.5 cm diameter; most lesions are 7-10 mm diameter.  They are firm nodules tethered to the skin surface and mobile over subcutaneous tissue.  The skin \"dimples\" on pinching the lesion.  Color may be pink to light brown in " "white skin, and dark brown to black in dark skin; some appear paler in the center.  They do not usually cause symptoms, but they are sometimes painful or itchy.  Because they are often raised lesions, they may be traumatized, for example by a razor.  Occasionally dozens may erupt within a few months, usually in the setting of immunosuppression (for example autoimmune disease, cancer or certain medications).  Dermatofibroma does not give rise to cancer. However, occasionally, it may be mistaken for dermatofibrosarcoma or desmoplastic melanoma.     A dermatofibroma is harmless and seldom causes any symptoms. Usually, only reassurance is needed. If it is nuisance or causing concern, the lesion can be removed surgically, resulting in a scar that is, by definition, usually longer in diameter than the widest portion of the dermatofibroma.  Cryotherapy, shave biopsy and laser surgery are rarely completely successful.  Skin punch biopsy or incisional biopsy may be undertaken if there is an atypical feature such as recent enlargement, ulceration, or asymmetrical structures and colours on dermatoscopy.        MELANOCYTIC NEVI (\"Moles\")  Assessment and Plan:  Based on a thorough discussion of this condition and the management approach to it (including a comprehensive discussion of the known risks, side effects and potential benefits of treatment), the patient (family) agrees to implement the following specific plan:  Reassured benign.   Monitor for changes. ABCDE's of melanoma handout provided.   Practice sun protection. Apply broad spectrum (UVA and UVB) sunscreen, SPF 30 or higher every 2 hours. Wear sun protective clothing, hats, and sunglasses.               Melanocytic Nevi  Melanocytic nevi (\"moles\") are tan or brown, raised or flat areas of the skin which have an increased number of melanocytes. Melanocytes are the cells in our body which make pigment and account for skin color.     Some moles are present at birth " "(I.e., \"congenital nevi\"), while others come up later in life (i.e., \"acquired nevi\").  The sun can stimulate the body to make more moles.  Sunburns are not the only thing that triggers more moles.  Chronic sun exposure can do it too.      Clinically distinguishing a healthy mole from melanoma may be difficult, even for experienced dermatologists. The \"ABCDE's\" of moles have been suggested as a means of helping to alert a person to a suspicious mole and the possible increased risk of melanoma.  The suggestions for raising alert are as follows:     Asymmetry: Healthy moles tend to be symmetric, while melanomas are often asymmetric.  Asymmetry means if you draw a line through the mole, the two halves do not match in color, size, shape, or surface texture. Asymmetry can be a result of rapid enlargement of a mole, the development of a raised area on a previously flat lesion, scaling, ulceration, bleeding or scabbing within the mole.  Any mole that starts to demonstrate \"asymmetry\" should be examined promptly by a board certified dermatologist.      Border: Healthy moles tend to have discrete, even borders.  The border of a melanoma often blends into the normal skin and does not sharply delineate the mole from normal skin.  Any mole that starts to demonstrate \"uneven borders\" should be examined promptly by a board certified dermatologist.      Color: Healthy moles tend to be one color throughout.  Melanomas tend to be made up of different colors ranging from dark black, blue, white, or red.  Any mole that demonstrates a color change should be examined promptly by a board certified dermatologist.      Diameter: Healthy moles tend to be smaller than 0.6 cm in size; an exception are \"congenital nevi\" that can be larger.  Melanomas tend to grow and can often be greater than 0.6 cm (1/4 of an inch, or the size of a pencil eraser). This is only a guideline, and many normal moles may be larger than 0.6 cm without being " "unhealthy.  Any mole that starts to change in size (small to bigger or bigger to smaller) should be examined promptly by a board certified dermatologist.      Evolving: Healthy moles tend to \"stay the same.\"  Melanomas may often show signs of change or evolution such as a change in size, shape, color, or elevation.  Any mole that starts to itch, bleed, crust, burn, hurt, or ulcerate or demonstrate a change or evolution should be examined promptly by a board certified dermatologist.       Dysplastic Nevi  Dysplastic moles are moles that fit the ABCDE rules of melanoma but are not identified as melanomas when examined under the microscope.  They may indicate an increased risk of melanoma in that person. If there is a family history of melanoma, most experts agree that the person may be at an increased risk for developing a melanoma.  Experts still do not agree on what dysplastic moles mean in patients without a personal or family history of melanoma.  Dysplastic moles are usually larger than common moles and have different colors within it with irregular borders. The appearance can be very similar to a melanoma. Biopsies of dysplastic moles may show abnormalities which are different from a regular mole.       Melanoma  Malignant melanoma is a type of skin cancer that can be deadly if it spreads throughout the body. The incidence of melanoma in the [place] is growing faster than any other cancer. Melanoma usually grows near the surface of the skin for a period of time, and then begins to grow deeper into the skin. Once it grows deeper into the skin, the risk of spread to other organs greatly increases. Therefore, early detection and removal of a malignant melanoma may result in a better chance at a complete cure; removal after the tumor has spread may not be as effective, leading to worse clinical outcomes such as death.     The true rate of nevus transformation into a melanoma is unknown. It has been estimated " "that the lifetime risk for any acquired melanocytic nevus on any 20-year-old individual transforming into melanoma by age 80 is 0.03% (1 in 3,164) for men and 0.009% (1 in 10,800) for women.      The appearance of a \"new mole\" remains one of the most reliable methods for identifying a malignant melanoma.  Occasionally, melanomas appear as rapidly growing, blue-black, dome-shaped bumps within a previous mole or previous area of normal skin.  Other times, melanomas are suspected when a mole suddenly appears or changes. Itching, burning, or pain in a pigmented lesion should increase suspicion, but most patients with early melanoma have no skin discomfort whatsoever.  Melanoma can occur anywhere on the skin, including areas that are difficult for self-examination. Many melanomas are first noticed by other family members.  Suspicious-looking moles may be removed for microscopic examination.        You may be able to prevent death from melanoma by doing two simple things:     Try to avoid unnecessary sun exposure and protect your skin when it is exposed to the sun.  People who live near the equator, people who have intermittent exposures to large amounts of sun, and people who have had sunburns in childhood or adolescence have an increased risk for melanoma. Sun sense and vigilant sun protection may be keys to helping to prevent melanoma.  We recommend wearing UPF-rated sun protective clothing and sunglasses whenever possible and applying a moisturizer-sunscreen combination product (SPF 50+) such as Neutrogena Daily Defense to sun exposed areas of skin at least three times a day.     Have your moles regularly examined by a board certified dermatologist AND by yourself or a family member/friend at home.  We recommend that you have your moles examined at least once a year by a board certified dermatologist.  Use your birthday as an annual reminder to have your \"Birthday Suit\" (I.e., your skin) examined; it is a nice " "birthday gift to yourself to know that your skin is healthy appearing!  Additionally, at-home self examinations may be helpful for detecting a possible melanoma.  Use the ABCDEs we discussed and check your moles once a month at home.             ANGIOMA (\"VALERIO ANGIOMA\")      Plan:  Reassured benign.      Valerio Angioma     Angiomas, also known as [place] spots, are benign vascular skin lesions. A \"cherry angioma\" is a firm red, blue or purple papule, 0.1-1 cm in diameter. When thrombosed, they can appear black in colour until evaluated with a dermatoscope when the red or purple colour is more easily seen. Cherry angioma may develop on any part of the body but most often appear on the scalp, face, lips and trunk.  An angioma is due to proliferating endothelial cells; these are the cells that line the inside of a blood vessel.     Angiomas can arise in early life or later in life; the most common type of angioma is a cherry angioma.  Cherry angiomas are very common in males and females of any age or race. They are more noticeable in white skin than in skin of colour. They markedly increase in number from about the age of 40. There may be a family history of similar lesions. Eruptive cherry angiomas have been rarely reported to be associated with internal malignancy. The cause of angiomas is unknown. Genetic analysis of cherry angiomas has shown that they frequently carry specific somatic missense mutations in the GNAQ and GNA11 (Q209H) genes, which are involved in other vascular and melanocytic proliferations.     Valerio angioma is usually diagnosed clinically and no investigations are necessary for the majority of lesions. It has a characteristic red-clod or lobular pattern on dermatoscopy (called lacunar pattern using conventional pattern analysis).  When there is uncertainty about the diagnosis, a biopsy may be performed. The angioma is composed of venules in a thickened papillary dermis. Collagen " bundles may be prominent between the lobules.     Cherry angiomas are harmless, so they do not usually have to be treated. Occasionally, they are removed to exclude a malignant skin lesion such as a nodular melanoma or because they are irritated or bleeding (and a subsequent risk for infection).

## 2025-04-02 NOTE — PROGRESS NOTES
"[facility] Note     Patient Name: Trinity Parra  Encounter Date: 4/2/2025     Have you been cared for by a [facility] Dermatologist in the last 3 years and, if so, which description applies to you?    Yes.  I have been here within the last 3 years, and my medical history has NOT changed since that time.  I am FEMALE/of child-bearing potential.    REVIEW OF SYSTEMS:  Have you recently had or currently have any of the following? No changes in my recent health.   PAST MEDICAL HISTORY:  Have you personally ever had or currently have any of the following?  If \"YES,\" then please provide more detail. No changes in my medical history.   HISTORY OF IMMUNOSUPPRESSION: Do you have a history of any of the following:  Systemic Immunosuppression such as Diabetes, Biologic or Immunotherapy, Chemotherapy, Organ Transplantation, Bone Marrow Transplantation or Prednisone?  No     Answering \"YES\" requires the addition of the dotphrase \"IMMUNOSUPPRESSED\" as the first diagnosis of the patient's visit.   FAMILY HISTORY:  Any \"first degree relatives\" (parent, brother, sister, or child) with the following?    No changes in my family's known health.   PATIENT EXPERIENCE:    Do you want the Dermatologist to perform a COMPLETE skin exam today including a clinical examination under the \"bra and underwear\" areas?  Yes  If necessary, do we have your permission to call and leave a detailed message on your Preferred Phone number that includes your specific medical information?  Yes      Allergies   Allergen Reactions    Cefuroxime      Other reaction(s): Hives    Cetirizine      Other reaction(s): sleepy  Other reaction(s): sleepy    Fexofenadine      Other reaction(s): palpitations  Other reaction(s): palpitations    Penicillins     Sulfa Antibiotics     Sulfanilamide      Other reaction(s): Hives      Current Outpatient Medications:     Ascorbic Acid (RALPH-C PO), Take by mouth  , Disp: , Rfl:     azithromycin (ZITHROMAX) " 250 mg tablet, , Disp: , Rfl:     cholecalciferol (VITAMIN D3) 1,000 units tablet, Take 1,000 Units by mouth daily DAILY, Disp: , Rfl:     estradiol (ESTRACE) 0.1 mg/g vaginal cream, Insert 1 g into the vagina once a week, Disp: 42.5 g, Rfl: 3    fezolinetant (Veozah) tablet, Take 1 tablet (45 mg total) by mouth daily, Disp: 90 tablet, Rfl: 3    hydrocortisone 2.5 % cream, Apply topically 2 (two) times a day For 2-3 weeks, Disp: 28 g, Rfl: 1    loratadine-pseudoephedrine (CLARITIN-D 24-HOUR)  mg per 24 hr tablet, Take 1 tablet by mouth daily, Disp: 90 tablet, Rfl: 1    methylPREDNISolone 4 MG tablet therapy pack, Use as directed on package, Disp: 1 each, Rfl: 0    Multiple Vitamins-Minerals (MULTIVITAMIN ADULT PO), every 24 hours, Disp: , Rfl:     Omega-3 Fatty Acids (fish oil) 1,000 mg, Take by mouth, Disp: , Rfl:     omeprazole (PriLOSEC) 40 MG capsule, Take 1 capsule (40 mg total) by mouth daily before breakfast (Patient taking differently: Take 40 mg by mouth daily before breakfast PRN), Disp: 90 capsule, Rfl: 5    tobramycin-dexamethasone (TOBRADEX) ophthalmic suspension, Administer 1 drop to the right eye 4 (four) times a day for 7 days, Disp: 5 mL, Rfl: 0    triamcinolone (KENALOG) 0.1 % ointment, Apply topically 2 (two) times a day To hands, Disp: 80 g, Rfl: 2    valACYclovir (VALTREX) 1,000 mg tablet, One bid prn herpes outbreak, Disp: 30 tablet, Rfl: 5          Whom besides the patient is providing clinical information about today's encounter?   NO ADDITIONAL HISTORIAN (patient alone provided history)    Physical Exam and Assessment/Plan by Diagnosis:    MELANONYCHIA STRIATA     Physical Exam:  Anatomic Location Affected:  Bilateral great toenails and all fingernails   Morphological Description:  Several uniformly colored brown streaks   Negative Bond's sign on all nails.     Additional History of Present Condition:  Patient present for recheck, states no changes since last visit. She reports  "streaks appear in her family members nails as well.     Assessment and Plan:  Based on a thorough discussion of this condition and the management approach to it (including a comprehensive discussion of the known risks, side effects and potential benefits of treatment), the patient (family) agrees to implement the following specific plan:  Reassured benign. Nails examined by Dr. Randolph as well.   She is to monitor for change, widening, Bond's sign (described to patient). No asymmetry or Bond's sign on any nails today.  Follow up for changes    DERMATOFIBROMA    Physical Exam:  Anatomic Location Affected:  Left arm, left lower leg, right lower leg.   Morphological Description:  brown papules with + dimple sign    Additional History of Present Condition:  noted on exam.    Assessment and Plan:  Based on a thorough discussion of this condition and the management approach to it (including a comprehensive discussion of the known risks, side effects and potential benefits of treatment), the patient (family) agrees to implement the following specific plan:  Reassured benign    MELANOCYTIC NEVI (\"Moles\")    Physical Exam:  Anatomic Location Affected:   Mostly on sun-exposed areas of the trunk and extremities   Morphological Description:  Scattered, 1-4mm round to ovoid, symmetrical-appearing, even bordered, skin colored to dark brown macules/papules      Additional History of Present Condition:  Present on exam. Denies any pain, itch, bleeding. Present for years. Denies actively changing or growing moles.     Assessment and Plan:  Based on a thorough discussion of this condition and the management approach to it (including a comprehensive discussion of the known risks, side effects and potential benefits of treatment), the patient (family) agrees to implement the following specific plan:  Reassured benign.   Monitor for changes. ABCDE's of melanoma handout provided.   Practice sun protection. Apply broad " "spectrum (UVA and UVB) sunscreen, SPF 30 or higher every 2 hours. Wear sun protective clothing, hats, and sunglasses.     ANGIOMA (\"CORDOBA ANGIOMA\")     Physical Exam:  Anatomic Location: scattered across trunk   Morphologic Description: 1-2 mm firm red to maroon to purples to blue discrete papule, on dermoscopy lacunae  by white septae seen       Additional History of Present Condition:  Present on exam.     Plan:  Reassured benign.     HAND DERMATITIS  Physical Exam:  Anatomic Location Affected:  Bilateral hands  Morphological Description: skin clear on exam today       Additional History of Present Condition:  Patient would like a refill on her triamcinolone ointment. She uses it as needed.      Assessment and Plan:  Based on a thorough discussion of this condition and the management approach to it (including a comprehensive discussion of the known risks, side effects and potential benefits of treatment), the patient (family) agrees to implement the following specific plan:  Continue triamcinolone 0.1% ointment as needed- apply twice daily for two weeks at a time for flares.  Continue frequent use of moisturizer.     Follow-up: 1 year for skin exam.   Scribe Attestation      I,:  Claudy Alves MA am acting as a scribe while in the presence of the attending physician.:       I,:  Annemarie Guo PA-C personally performed the services described in this documentation    as scribed in my presence.:             "

## 2025-04-03 ENCOUNTER — TELEPHONE (OUTPATIENT)
Age: 60
End: 2025-04-03

## 2025-04-03 RX ORDER — TRIAMCINOLONE ACETONIDE 1 MG/G
OINTMENT TOPICAL 2 TIMES DAILY
Qty: 30 G | Refills: 0 | Status: CANCELLED | OUTPATIENT
Start: 2025-04-03

## 2025-04-03 NOTE — TELEPHONE ENCOUNTER
Rec'd call from patient stating that at yesterdays appointment, she was told her refill for KENALOG 0.1% ointment 80 mg would be sent to pharmacy.    Patient states that she requested 30 mg, due to she carries the ointment with her.    She states that she received call from [employer] that her 80 mg prescription is ready to be picked up.    Patient states that she did not  script.     She'd like it RESENT to pharmacy for 30 mg.    Please contact patient direction with any questions/issues.

## 2025-04-04 DIAGNOSIS — L30.9 HAND DERMATITIS: ICD-10-CM

## 2025-04-04 RX ORDER — TRIAMCINOLONE ACETONIDE 1 MG/G
OINTMENT TOPICAL
Qty: 30 G | Refills: 0 | Status: SHIPPED | OUTPATIENT
Start: 2025-04-04

## 2025-04-08 NOTE — TELEPHONE ENCOUNTER
Pt calling to check on correct size ointment being sent to [facility]    Confirmed w/ pt Annemarie sent 30 to [facility] on 4/4    Pt stated the text she received from [facility] has 80 listed    Advised to check w/[facility] and if its still the larger size to call us back and we can contact pharmacy

## 2025-05-13 ENCOUNTER — OFFICE VISIT (OUTPATIENT)
Dept: GASTROENTEROLOGY | Facility: CLINIC | Age: 60
End: 2025-05-13
Payer: COMMERCIAL

## 2025-05-13 VITALS
HEIGHT: 60 IN | SYSTOLIC BLOOD PRESSURE: 118 MMHG | TEMPERATURE: 96.5 F | DIASTOLIC BLOOD PRESSURE: 70 MMHG | WEIGHT: 150.8 LBS | BODY MASS INDEX: 29.61 KG/M2

## 2025-05-13 DIAGNOSIS — K21.9 GASTROESOPHAGEAL REFLUX DISEASE WITHOUT ESOPHAGITIS: Primary | ICD-10-CM

## 2025-05-13 DIAGNOSIS — Z11.59 NEED FOR HEPATITIS B SCREENING TEST: ICD-10-CM

## 2025-05-13 DIAGNOSIS — Z12.11 COLON CANCER SCREENING: ICD-10-CM

## 2025-05-13 PROCEDURE — 99214 OFFICE O/P EST MOD 30 MIN: CPT | Performed by: INTERNAL MEDICINE

## 2025-05-13 NOTE — PROGRESS NOTES
Name: Trinity Parra      : 1965      MRN: [id number]  Encounter Provider: Anna Marie Thakkar DO  Encounter Date: 2025   Encounter department: [facility] [place]  :  Assessment & Plan  Gastroesophageal reflux disease without esophagitis  Taking PPI as needed, can continue this, milk is a trigger        Colon cancer screening  -uptodate, repeat colonoscopy in , no family history of colon cancer        Need for hepatitis B screening test  Will order   Orders:  •  Hepatitis B surface antibody; Future  •  Hepatitis B surface antigen; Future  •  Hepatitis B core antibody, total; Future        History of Present Illness   Trinity Parra is a 60 y.o. female who presents for follow-up.  She is currently having only heartburn symptoms when she eats certain foods or eats late at night.  She does not take PPI daily but only takes it as needed.  Has no other complaints.  Has less stress in her life and show she is happy about that.  HPI    Review of Systems A complete review of systems is negative other than that noted above in the HPI.      Current Outpatient Medications   Medication Sig Dispense Refill   • Ascorbic Acid (RALPH-C PO) Take by mouth       • azithromycin (ZITHROMAX) 250 mg tablet      • cholecalciferol (VITAMIN D3) 1,000 units tablet Take 1,000 Units by mouth daily DAILY     • estradiol (ESTRACE) 0.1 mg/g vaginal cream Insert 1 g into the vagina once a week 42.5 g 3   • fezolinetant (Veozah) tablet Take 1 tablet (45 mg total) by mouth daily 90 tablet 3   • hydrocortisone 2.5 % cream Apply topically 2 (two) times a day For 2-3 weeks 28 g 1   • loratadine-pseudoephedrine (CLARITIN-D 24-HOUR)  mg per 24 hr tablet Take 1 tablet by mouth daily 90 tablet 1   • methylPREDNISolone 4 MG tablet therapy pack Use as directed on package 1 each 0   • Multiple Vitamins-Minerals (MULTIVITAMIN ADULT PO) every 24 hours     • Omega-3 Fatty Acids (fish oil) 1,000 mg  Take by mouth     • omeprazole (PriLOSEC) 40 MG capsule Take 1 capsule (40 mg total) by mouth daily before breakfast 90 capsule 5   • triamcinolone (KENALOG) 0.1 % ointment Apply twice a day to hands for up to 2 weeks as needed for flares. 30 g 0   • valACYclovir (VALTREX) 1,000 mg tablet One bid prn herpes outbreak 30 tablet 5   • tobramycin-dexamethasone (TOBRADEX) ophthalmic suspension Administer 1 drop to the right eye 4 (four) times a day for 7 days 5 mL 0     No current facility-administered medications for this visit.     Objective   /70   Temp (!) 96.5 °F (35.8 °C) (Oral)   Ht 5' (1.524 m)   Wt 68.4 kg (150 lb 12.8 oz)   LMP 07/03/2017   BMI 29.45 kg/m²     Physical Exam  Constitutional:       Appearance: Normal appearance.   Abdominal:      General: Abdomen is flat. Bowel sounds are normal.      Palpations: Abdomen is soft.   Neurological:      Mental Status: She is alert.            Lab Results: I personally reviewed relevant lab results.       Results for orders placed during the hospital encounter of 12/27/24    EGD    Impression  The esophagus appeared normal.  Moderate erythematous mucosa in the antrum, consistent with gastritis; performed cold forceps biopsy to rule out H. pylori  Performed forceps biopsies in the greater curve of the stomach, lesser curve of the stomach, incisura and antrum to rule out H. pylori  The 1st part of the duodenum and 2nd part of the duodenum appeared normal.      RECOMMENDATION:  Await pathology results  Colonoscopy to follow.        Anna Marie Thakkar DO

## 2025-05-15 ENCOUNTER — TELEPHONE (OUTPATIENT)
Age: 60
End: 2025-05-15

## 2025-05-15 NOTE — TELEPHONE ENCOUNTER
Patients GI provider:  Dr. Thakkar    Number to return call: [phone]    Reason for call: Pt calling to inquire if Hepatitis blood work requires fasting . Nothing stated on script requiring fasting . No further questions .     Scheduled procedure/appointment date if applicable: Apt/procedure n/a

## 2025-05-19 LAB
HBV CORE AB SERPL QL IA: NORMAL
HBV SURFACE AB SERPL IA-ACNC: <5 MIU/ML
HBV SURFACE AG SERPL QL IA: NORMAL

## 2025-05-20 ENCOUNTER — RESULTS FOLLOW-UP (OUTPATIENT)
Dept: GASTROENTEROLOGY | Facility: CLINIC | Age: 60
End: 2025-05-20

## 2025-05-29 ENCOUNTER — TELEPHONE (OUTPATIENT)
Dept: GASTROENTEROLOGY | Facility: CLINIC | Age: 60
End: 2025-05-29

## 2025-05-29 NOTE — TELEPHONE ENCOUNTER
Pt is aware  recommended Hepatitis vaccine series. She is inquiring if she should be concerned regarding her low level.     Hepatitis B Surface Antibody Immunity, QN <5 Low

## 2025-06-12 ENCOUNTER — ANNUAL EXAM (OUTPATIENT)
Dept: GYNECOLOGY | Facility: CLINIC | Age: 60
End: 2025-06-12
Payer: COMMERCIAL

## 2025-06-12 VITALS
DIASTOLIC BLOOD PRESSURE: 70 MMHG | BODY MASS INDEX: 29.8 KG/M2 | WEIGHT: 151.8 LBS | HEIGHT: 60 IN | HEART RATE: 76 BPM | SYSTOLIC BLOOD PRESSURE: 120 MMHG

## 2025-06-12 DIAGNOSIS — Z01.419 ENCOUNTER FOR GYNECOLOGICAL EXAMINATION WITHOUT ABNORMAL FINDING: Primary | ICD-10-CM

## 2025-06-12 DIAGNOSIS — Z13.820 SCREENING FOR OSTEOPOROSIS: ICD-10-CM

## 2025-06-12 DIAGNOSIS — N95.2 ATROPHIC VAGINITIS: ICD-10-CM

## 2025-06-12 DIAGNOSIS — D21.9 FIBROIDS: ICD-10-CM

## 2025-06-12 PROCEDURE — G0145 SCR C/V CYTO,THINLAYER,RESCR: HCPCS | Performed by: OBSTETRICS & GYNECOLOGY

## 2025-06-12 PROCEDURE — 76977 US BONE DENSITY MEASURE: CPT | Performed by: OBSTETRICS & GYNECOLOGY

## 2025-06-12 PROCEDURE — S0612 ANNUAL GYNECOLOGICAL EXAMINA: HCPCS | Performed by: OBSTETRICS & GYNECOLOGY

## 2025-06-12 RX ORDER — ESTRADIOL 0.1 MG/G
1 CREAM VAGINAL WEEKLY
Qty: 42.5 G | Refills: 3 | Status: SHIPPED | OUTPATIENT
Start: 2025-06-12

## 2025-06-12 NOTE — PROGRESS NOTES
Name: Trinity Parra      : 1965      MRN: [id number]  Encounter Provider: Everton Daniel DO  Encounter Date: 2025   Encounter department: [facility]  :  Assessment & Plan  Encounter for gynecological examination without abnormal finding         Fibroids         Atrophic vaginitis  Continue Estrace cream  Orders:    estradiol (ESTRACE) 0.1 mg/g vaginal cream; Insert 1 g into the vagina once a week    Screening for osteoporosis  Peripheral scan: + 0.8           History of Present Illness   HPI  Trinity Parra is a 60 y.o. female who presents for an examination.  She offers no complaints.  She had been on Veozah for vasomotor symptoms however this did not help and she discontinued it.  Vasomotor symptoms are tolerable.  She denies any vaginal irritation, burning, discharge or bleeding.  Denies any dysuria, hematuria urgency or urinary incontinence.  No GI complaints.    Colonoscopy 2024.  Normal.  Repeat in 10 years.    Osteoporosis screening via peripheral scan March or .  0.9      Review of Systems   Constitutional: Negative.    HENT:  Negative for sore throat and trouble swallowing.    Gastrointestinal: Negative.    Genitourinary: Negative.           Objective   Ht 5' (1.524 m)   Wt 68.9 kg (151 lb 12.8 oz)   LMP 2017   BMI 29.65 kg/m²      Physical Exam  Vitals reviewed.     Cardiovascular:      Rate and Rhythm: Normal rate and regular rhythm.      Pulses: Normal pulses.      Heart sounds: Normal heart sounds. No murmur heard.  Pulmonary:      Effort: Pulmonary effort is normal. No respiratory distress.      Breath sounds: Normal breath sounds.   Chest:   Breasts:     Right: No swelling, bleeding, inverted nipple, mass, nipple discharge, skin change or tenderness.      Left: No swelling, bleeding, inverted nipple, mass, nipple discharge, skin change or tenderness.   Abdominal:      General: There is no distension.      Palpations:  Abdomen is soft. There is no mass.      Tenderness: There is no abdominal tenderness. There is no guarding or rebound.      Hernia: No hernia is present. There is no hernia in the left inguinal area or right inguinal area.   Genitourinary:     General: Normal vulva.      Labia:         Right: No rash, tenderness or lesion.         Left: No rash, tenderness or lesion.       Vagina: Normal.      Cervix: Normal.      Uterus: Enlarged (Stable consistent with 12-week size).       Adnexa:         Right: No mass, tenderness or fullness.          Left: No mass, tenderness or fullness.       Musculoskeletal:      Cervical back: Normal range of motion and neck supple. No tenderness.   Lymphadenopathy:      Cervical: No cervical adenopathy.      Upper Body:      Right upper body: No supraclavicular, axillary or pectoral adenopathy.      Left upper body: No supraclavicular, axillary or pectoral adenopathy.      Lower Body: No right inguinal adenopathy. No left inguinal adenopathy.     Neurological:      Mental Status: She is alert.

## 2025-06-20 LAB
LAB AP GYN PRIMARY INTERPRETATION: NORMAL
Lab: NORMAL

## 2025-07-02 ENCOUNTER — TELEPHONE (OUTPATIENT)
Age: 60
End: 2025-07-02

## 2025-07-02 NOTE — TELEPHONE ENCOUNTER
"Patient calling to report received a letter from her insurance that her recent bone scan coverage was denied as the diagnosis code utilized is not \"preventative\" reviewed diagnosis used was \"screening for osteoporosis.\" Unsure what diagnosis they are looking for.    Message to clerical team for follow up.  "

## 2025-07-08 NOTE — TELEPHONE ENCOUNTER
Patient calling to follow up. Reviewed response per Dr. Daniel and advised awaiting . Patient verbalizes understanding.

## 2025-07-09 DIAGNOSIS — Z11.59 NEED FOR HEPATITIS B SCREENING TEST: Primary | ICD-10-CM

## 2025-07-09 DIAGNOSIS — Z00.00 PREVENTATIVE HEALTH CARE: Primary | ICD-10-CM

## 2025-07-09 DIAGNOSIS — Z00.00 PREVENTATIVE HEALTH CARE: ICD-10-CM

## 2025-07-16 ENCOUNTER — OFFICE VISIT (OUTPATIENT)
Dept: FAMILY MEDICINE CLINIC | Facility: CLINIC | Age: 60
End: 2025-07-16
Payer: COMMERCIAL

## 2025-07-16 VITALS
WEIGHT: 153.2 LBS | RESPIRATION RATE: 18 BRPM | BODY MASS INDEX: 30.08 KG/M2 | HEIGHT: 60 IN | OXYGEN SATURATION: 97 % | DIASTOLIC BLOOD PRESSURE: 70 MMHG | TEMPERATURE: 98.8 F | HEART RATE: 83 BPM | SYSTOLIC BLOOD PRESSURE: 138 MMHG

## 2025-07-16 DIAGNOSIS — M54.12 CERVICAL RADICULOPATHY: ICD-10-CM

## 2025-07-16 DIAGNOSIS — Z00.00 ANNUAL PHYSICAL EXAM: Primary | ICD-10-CM

## 2025-07-16 DIAGNOSIS — T78.40XA ALLERGIC REACTION, INITIAL ENCOUNTER: ICD-10-CM

## 2025-07-16 DIAGNOSIS — K21.9 GASTROESOPHAGEAL REFLUX DISEASE WITHOUT ESOPHAGITIS: ICD-10-CM

## 2025-07-16 DIAGNOSIS — Z13.1 SCREENING FOR DIABETES MELLITUS: ICD-10-CM

## 2025-07-16 DIAGNOSIS — D64.9 ANEMIA, UNSPECIFIED TYPE: ICD-10-CM

## 2025-07-16 DIAGNOSIS — Z13.6 SCREENING FOR CARDIOVASCULAR CONDITION: ICD-10-CM

## 2025-07-16 DIAGNOSIS — J30.9 ALLERGIC RHINITIS, UNSPECIFIED SEASONALITY, UNSPECIFIED TRIGGER: ICD-10-CM

## 2025-07-16 DIAGNOSIS — B00.9 RECURRENT HERPES SIMPLEX: Chronic | ICD-10-CM

## 2025-07-16 PROCEDURE — 99396 PREV VISIT EST AGE 40-64: CPT | Performed by: FAMILY MEDICINE

## 2025-07-16 PROCEDURE — 99214 OFFICE O/P EST MOD 30 MIN: CPT | Performed by: FAMILY MEDICINE

## 2025-07-16 NOTE — PATIENT INSTRUCTIONS
"Patient Education     Routine physical for adults   The Basics   Written by the doctors and editors at [facility]   What is a physical? -- A physical is a routine visit, or \"check-up,\" with your doctor. You might also hear it called a \"wellness visit\" or \"preventive visit.\"  During each visit, the doctor will:   Ask about your physical and mental health   Ask about your habits, behaviors, and lifestyle   Do an exam   Give you vaccines if needed   Talk to you about any medicines you take   Give advice about your health   Answer your questions  Getting regular check-ups is an important part of taking care of your health. It can help your doctor find and treat any problems you have. But it's also important for preventing health problems.  A routine physical is different from a \"sick visit.\" A sick visit is when you see a doctor because of a health concern or problem. Since physicals are scheduled ahead of time, you can think about what you want to ask the doctor.  How often should I get a physical? -- It depends on your age and health. In general, for people age 21 years and older:   If you are younger than 50 years, you might be able to get a physical every 3 years.   If you are 50 years or older, your doctor might recommend a physical every year.  If you have an ongoing health condition, like diabetes or high blood pressure, your doctor will probably want to see you more often.  What happens during a physical? -- In general, each visit will include:   Physical exam - The doctor or nurse will check your height, weight, heart rate, and blood pressure. They will also look at your eyes and ears. They will ask about how you are feeling and whether you have any symptoms that bother you.   Medicines - It's a good idea to bring a list of all the medicines you take to each doctor visit. Your doctor will talk to you about your medicines and answer any questions. Tell them if you are having any side effects that bother you. You " "should also tell them if you are having trouble paying for any of your medicines.   Habits and behaviors - This includes:   Your diet   Your exercise habits   Whether you smoke, drink alcohol, or use drugs   Whether you are sexually active   Whether you feel safe at home  Your doctor will talk to you about things you can do to improve your health and lower your risk of health problems. They will also offer help and support. For example, if you want to quit smoking, they can give you advice and might prescribe medicines. If you want to improve your diet or get more physical activity, they can help you with this, too.   Lab tests, if needed - The tests you get will depend on your age and situation. For example, your doctor might want to check your:   Cholesterol   Blood sugar   Iron level   Vaccines - The recommended vaccines will depend on your age, health, and what vaccines you already had. Vaccines are very important because they can prevent certain serious or deadly infections.   Discussion of screening - \"Screening\" means checking for diseases or other health problems before they cause symptoms. Your doctor can recommend screening based on your age, risk, and preferences. This might include tests to check for:   Cancer, such as breast, prostate, cervical, ovarian, colorectal, prostate, lung, or skin cancer   Sexually transmitted infections, such as chlamydia and gonorrhea   Mental health conditions like depression and anxiety  Your doctor will talk to you about the different types of screening tests. They can help you decide which screenings to have. They can also explain what the results might mean.   Answering questions - The physical is a good time to ask the doctor or nurse questions about your health. If needed, they can refer you to other doctors or specialists, too.  Adults older than 65 years often need other care, too. As you get older, your doctor will talk to you about:   How to prevent falling at " home   Hearing or vision tests   Memory testing   How to take your medicines safely   Making sure that you have the help and support you need at home  All topics are updated as new evidence becomes available and our peer review process is complete.  This topic retrieved from [employer] on: May 02, 2024.  Topic 903229 Version 1.0  Release: 32.4.3 - C32.122  © 2024 UpToDate, Inc. and/or its affiliates. All rights reserved.  Consumer Information Use and Disclaimer   Disclaimer: This generalized information is a limited summary of diagnosis, treatment, and/or medication information. It is not meant to be comprehensive and should be used as a tool to help the user understand and/or assess potential diagnostic and treatment options. It does NOT include all information about conditions, treatments, medications, side effects, or risks that may apply to a specific patient. It is not intended to be medical advice or a substitute for the medical advice, diagnosis, or treatment of a health care provider based on the health care provider's examination and assessment of a patient's specific and unique circumstances. Patients must speak with a health care provider for complete information about their health, medical questions, and treatment options, including any risks or benefits regarding use of medications. This information does not endorse any treatments or medications as safe, effective, or approved for treating a specific patient. UpToDate, Inc. and its affiliates disclaim any warranty or liability relating to this information or the use thereof.The use of this information is governed by the Terms of Use, available at https://www.[employer].com/en/know/clinical-effectiveness-terms. 2024© UpToDate, Inc. and its affiliates and/or licensors. All rights reserved.  Copyright   © 2024 UpToDate, Inc. and/or its affiliates. All rights reserved.

## 2025-07-16 NOTE — PROGRESS NOTES
Adult Annual Physical  Name: Trinity Parra      : 1965      MRN: [id number]  Encounter Provider: Patrice Gross DO  Encounter Date: 2025   Encounter department: [facility]    :  Assessment & Plan  Annual physical exam         Allergic rhinitis, unspecified seasonality, unspecified trigger  Stable  Remains on loratadine daily  Continue to monitor       Gastroesophageal reflux disease without esophagitis  Stable  Continue omeprazole 40 mg daily       Cervical radiculopathy  Well-controlled  Does not bother patient any longer         Recurrent herpes simplex  Stable  Has valacyclovir to use as needed       Allergic reaction, initial encounter         Anemia, unspecified type  Patient was found to be anemic during hospitalization for carbon oxide poisoning  Will reevaluate with CBC and iron panel    Orders:  •  CBC and differential; Future  •  Iron Panel (Includes Ferritin, Iron Sat%, Iron, and TIBC); Future    Screening for cardiovascular condition    Orders:  •  Lipid panel; Future    Screening for diabetes mellitus    Orders:  •  Comprehensive metabolic panel; Future        Preventive Screenings:  - Diabetes Screening: risks/benefits discussed  - Cholesterol Screening: risks/benefits discussed   - Hepatitis C screening: screening up-to-date   - HIV screening: screening up-to-date   - Cervical cancer screening: screening up-to-date   - Breast cancer screening: screening up-to-date   - Colon cancer screening: screening up-to-date   - Lung cancer screening: screening not indicated     Immunizations:  - Immunizations due: Prevnar 20    Counseling/Anticipatory Guidance:  - Alcohol: discussed moderation in alcohol intake and recommendations for healthy alcohol use.   - Drug use: discussed harms of illicit drug use and how it can negatively impact mental/physical health.   - Tobacco use: discussed harms of tobacco use and management options for quitting.   - Dental  health: discussed importance of regular tooth brushing, flossing, and dental visits.   - Sexual health: discussed sexually transmitted diseases, partner selection, use of condoms, avoidance of unintended pregnancy, and contraceptive alternatives.   - Diet: discussed recommendations for a healthy/well-balanced diet.   - Exercise: the importance of regular exercise/physical activity was discussed. Recommend exercise 3-5 times per week for at least 30 minutes.   - Injury prevention: discussed safety/seat belts, safety helmets, smoke detectors, carbon monoxide detectors, and smoking near bedding or upholstery.       Depression Screening and Follow-up Plan: Patient was screened for depression during today's encounter. They screened negative with a PHQ-2 score of 0.          History of Present Illness     Adult Annual Physical:  Patient presents for annual physical.     Diet and Physical Activity:  - Diet/Nutrition: no special diet.  - Exercise: moderate cardiovascular exercise, 3-4 times a week on average and 30-60 minutes on average.    Depression Screening:  - PHQ-2 Score: 0    General Health:  - Sleep: sleeps well and 4-6 hours of sleep on average.  - Hearing: normal hearing right ear and normal hearing left ear.  - Vision: no vision problems and wears glasses.  - Dental: regular dental visits, brushes teeth twice daily and floss regularly.    /GYN Health:  - Follows with GYN: yes.   - Menopause: postmenopausal.   - History of STDs: no    Advanced Care Planning:  - Has an advanced directive?: no    - Has a durable medical POA?: no    - ACP document given to patient?: no      Review of Systems   Constitutional:  Negative for activity change, chills, diaphoresis and fever.   HENT:  Negative for ear pain, hearing loss, postnasal drip, rhinorrhea, sinus pressure, sinus pain, sneezing and sore throat.    Respiratory:  Negative for cough, chest tightness, shortness of breath and wheezing.    Cardiovascular:  Negative for  "chest pain, palpitations and leg swelling.   Gastrointestinal:  Negative for abdominal pain, blood in stool, constipation, diarrhea, nausea and vomiting.   Genitourinary:  Negative for dysuria, frequency, hematuria and urgency.   Musculoskeletal:  Negative for arthralgias and myalgias.   Neurological:  Negative for dizziness, syncope, weakness, light-headedness, numbness and headaches.         Objective   /70 (BP Location: Left arm, Patient Position: Sitting, Cuff Size: Standard)   Pulse 83   Temp 98.8 °F (37.1 °C) (Temporal)   Resp 18   Ht 4' 11.75\" (1.518 m)   Wt 69.5 kg (153 lb 3.2 oz)   LMP 07/03/2017   SpO2 97%   BMI 30.17 kg/m²     Physical Exam  Constitutional:       General: She is not in acute distress.     Appearance: Normal appearance. She is well-developed. She is not diaphoretic.   HENT:      Head: Normocephalic and atraumatic.      Right Ear: Tympanic membrane, ear canal and external ear normal. There is no impacted cerumen.      Left Ear: Tympanic membrane, ear canal and external ear normal. There is no impacted cerumen.      Nose: Nose normal. No congestion or rhinorrhea.      Mouth/Throat:      Mouth: Mucous membranes are moist.      Pharynx: Oropharynx is clear. No oropharyngeal exudate.     Eyes:      Conjunctiva/sclera: Conjunctivae normal.      Pupils: Pupils are equal, round, and reactive to light.     Neck:      Vascular: No JVD.     Cardiovascular:      Rate and Rhythm: Normal rate and regular rhythm.      Heart sounds: Normal heart sounds. No murmur heard.     No friction rub. No gallop.   Pulmonary:      Effort: Pulmonary effort is normal. No respiratory distress.      Breath sounds: Normal breath sounds. No wheezing or rales.   Chest:      Chest wall: No tenderness.   Abdominal:      General: Bowel sounds are normal. There is no distension.      Palpations: Abdomen is soft.      Tenderness: There is no abdominal tenderness. There is no right CVA tenderness, left CVA " tenderness, guarding or rebound.     Musculoskeletal:         General: No tenderness. Normal range of motion.      Cervical back: No tenderness.   Lymphadenopathy:      Cervical: No cervical adenopathy.     Skin:     General: Skin is warm and dry.     Neurological:      Mental Status: She is alert and oriented to person, place, and time.      Cranial Nerves: No cranial nerve deficit.     Psychiatric:         Mood and Affect: Mood and affect normal.         Behavior: Behavior normal.

## 2025-07-22 ENCOUNTER — TELEPHONE (OUTPATIENT)
Age: 60
End: 2025-07-22

## 2025-07-22 NOTE — TELEPHONE ENCOUNTER
I returned call to patient       Patient was updating me as far as not receiving update on bill from [facility] [employer] [facility]     I advised as per our previous task    I sent new script to [facility]     Patient has to follow up with [employer] since they submit to pts insurance     Patient will keep us updated

## 2025-07-22 NOTE — TELEPHONE ENCOUNTER
Pt. Calling asking to speak with Nanette Astorga in reference to Hep B bill and coding, please call pt. When available

## 2025-07-22 NOTE — TELEPHONE ENCOUNTER
Pt called back to see if Nanette could call [email or web address] to update coding. Pt states [facility] rep said fax could take up to 30 days to view. Pt is hoping for some help. [email or web address] number is  and any Rep can help.

## 2025-07-23 NOTE — TELEPHONE ENCOUNTER
Pt. Called back, asking to speak with Nanette Astorga, warm transfer to [place] office to speak with  Nanette

## 2025-07-23 NOTE — TELEPHONE ENCOUNTER
Patient states she was called by her [employer] insurance last night and was advised CPT code needs to be changed      I reviewed with Trinity as in previous task/communications we were asked to change dx code to preventative and make it primary dx and we did and faxed to [facility]       Patient states again evita says it can take up to 30 days to process    I advised that's between her and [facility]ita    Patient went to lab outside of [facility],we truly can not assist with their billing practices     I advised I can call [facility] on a 3 way call with patient I have to do this later today once patients are done , patient agreeable,

## 2025-07-24 NOTE — TELEPHONE ENCOUNTER
Gayle on a 3 way call called [facility]     We spoke with rep who took a verbal DX code change     Rep explained it can take up tp 30 -45 days    Even though we are asking for expedited review       Wanda explained to patient the difference between CPT codes and DX code       CPT codes are universal and can not be changed once preformed       Patient verbalizes understanding

## 2025-08-06 ENCOUNTER — TELEPHONE (OUTPATIENT)
Age: 60
End: 2025-08-06

## 2025-08-08 ENCOUNTER — TELEPHONE (OUTPATIENT)
Age: 60
End: 2025-08-08

## 2025-08-09 ENCOUNTER — APPOINTMENT (OUTPATIENT)
Dept: LAB | Facility: MEDICAL CENTER | Age: 60
End: 2025-08-09
Payer: COMMERCIAL

## 2025-08-12 ENCOUNTER — HOSPITAL ENCOUNTER (OUTPATIENT)
Dept: MAMMOGRAPHY | Facility: MEDICAL CENTER | Age: 60
Discharge: HOME | End: 2025-08-12
Attending: OBSTETRICS & GYNECOLOGY
Payer: COMMERCIAL

## 2025-08-18 ENCOUNTER — TELEPHONE (OUTPATIENT)
Dept: GASTROENTEROLOGY | Facility: CLINIC | Age: 60
End: 2025-08-18

## 2025-08-21 DIAGNOSIS — B19.9 VIRAL HEPATITIS WITHOUT HEPATIC COMA, UNSPECIFIED CHRONICITY, UNSPECIFIED VIRAL HEPATITIS TYPE: Primary | ICD-10-CM
